# Patient Record
Sex: MALE | Race: WHITE | Employment: FULL TIME
[De-identification: names, ages, dates, MRNs, and addresses within clinical notes are randomized per-mention and may not be internally consistent; named-entity substitution may affect disease eponyms.]

---

## 2017-01-09 RX ORDER — PRAVASTATIN SODIUM 80 MG/1
80 TABLET ORAL DAILY
Qty: 90 TABLET | Refills: 3 | Status: SHIPPED | OUTPATIENT
Start: 2017-01-09 | End: 2017-07-24

## 2017-01-09 RX ORDER — GABAPENTIN 400 MG/1
400 CAPSULE ORAL 2 TIMES DAILY
Qty: 180 CAPSULE | Refills: 3 | Status: SHIPPED | OUTPATIENT
Start: 2017-01-09 | End: 2017-07-12 | Stop reason: SDUPTHER

## 2017-01-09 RX ORDER — AMLODIPINE BESYLATE 10 MG/1
10 TABLET ORAL DAILY
Qty: 90 TABLET | Refills: 3 | Status: SHIPPED | OUTPATIENT
Start: 2017-01-09 | End: 2017-12-07 | Stop reason: SDUPTHER

## 2017-01-09 RX ORDER — LISINOPRIL AND HYDROCHLOROTHIAZIDE 20; 12.5 MG/1; MG/1
1 TABLET ORAL DAILY
Qty: 90 TABLET | Refills: 3 | Status: SHIPPED | OUTPATIENT
Start: 2017-01-09 | End: 2017-12-07 | Stop reason: SDUPTHER

## 2017-01-09 RX ORDER — WARFARIN SODIUM 5 MG/1
TABLET ORAL
Qty: 180 TABLET | Refills: 3 | Status: SHIPPED | OUTPATIENT
Start: 2017-01-09 | End: 2017-07-24

## 2017-01-23 LAB
INR BLD: 1.8
PROTIME: 20.6 SECONDS

## 2017-01-24 ENCOUNTER — ANTI-COAG VISIT (OUTPATIENT)
Dept: FAMILY MEDICINE CLINIC | Facility: CLINIC | Age: 62
End: 2017-01-24

## 2017-01-24 DIAGNOSIS — I82.492 DEEP VEIN THROMBOSIS (DVT) OF OTHER VEIN OF LEFT LOWER EXTREMITY, UNSPECIFIED CHRONICITY (HCC): ICD-10-CM

## 2017-02-27 LAB
INR BLD: 1.4
PROTIME: 15.9 SECONDS

## 2017-02-28 ENCOUNTER — ANTI-COAG VISIT (OUTPATIENT)
Dept: FAMILY MEDICINE CLINIC | Facility: CLINIC | Age: 62
End: 2017-02-28

## 2017-02-28 DIAGNOSIS — I82.492 DEEP VEIN THROMBOSIS (DVT) OF OTHER VEIN OF LEFT LOWER EXTREMITY, UNSPECIFIED CHRONICITY (HCC): ICD-10-CM

## 2017-03-18 LAB
INR BLD: 1.9
PROTIME: 21.8 SECONDS

## 2017-03-20 ENCOUNTER — ANTI-COAG VISIT (OUTPATIENT)
Dept: FAMILY MEDICINE CLINIC | Age: 62
End: 2017-03-20

## 2017-03-20 DIAGNOSIS — I82.492 DEEP VEIN THROMBOSIS (DVT) OF OTHER VEIN OF LEFT LOWER EXTREMITY, UNSPECIFIED CHRONICITY (HCC): ICD-10-CM

## 2017-04-17 LAB
INR BLD: 2.2
PROTIME: 25.2 SECONDS

## 2017-04-18 ENCOUNTER — ANTI-COAG VISIT (OUTPATIENT)
Dept: FAMILY MEDICINE CLINIC | Age: 62
End: 2017-04-18

## 2017-04-18 DIAGNOSIS — I82.492 DEEP VEIN THROMBOSIS (DVT) OF OTHER VEIN OF LEFT LOWER EXTREMITY, UNSPECIFIED CHRONICITY (HCC): ICD-10-CM

## 2017-05-01 LAB
INR BLD: 2
PROTIME: 23.1 SECONDS

## 2017-05-02 ENCOUNTER — ANTI-COAG VISIT (OUTPATIENT)
Dept: FAMILY MEDICINE CLINIC | Age: 62
End: 2017-05-02

## 2017-05-02 DIAGNOSIS — I82.492 DEEP VEIN THROMBOSIS (DVT) OF OTHER VEIN OF LEFT LOWER EXTREMITY, UNSPECIFIED CHRONICITY (HCC): ICD-10-CM

## 2017-06-22 DIAGNOSIS — I82.409 DVT (DEEP VENOUS THROMBOSIS) (HCC): ICD-10-CM

## 2017-06-22 DIAGNOSIS — Z79.01 ENCOUNTER FOR MONITORING COUMADIN THERAPY: ICD-10-CM

## 2017-06-22 DIAGNOSIS — Z51.81 ENCOUNTER FOR MONITORING COUMADIN THERAPY: ICD-10-CM

## 2017-06-23 LAB
INR BLD: 2
INR BLD: 2
INR BLD: 2.3
PROTIME: 22.8 SECONDS

## 2017-06-26 ENCOUNTER — ANTI-COAG VISIT (OUTPATIENT)
Dept: FAMILY MEDICINE CLINIC | Age: 62
End: 2017-06-26

## 2017-06-26 DIAGNOSIS — I82.492 DEEP VEIN THROMBOSIS (DVT) OF OTHER VEIN OF LEFT LOWER EXTREMITY, UNSPECIFIED CHRONICITY (HCC): ICD-10-CM

## 2017-07-05 ENCOUNTER — TELEPHONE (OUTPATIENT)
Dept: FAMILY MEDICINE CLINIC | Age: 62
End: 2017-07-05

## 2017-07-05 DIAGNOSIS — E11.9 TYPE 2 DIABETES MELLITUS WITHOUT COMPLICATION, WITHOUT LONG-TERM CURRENT USE OF INSULIN (HCC): Primary | ICD-10-CM

## 2017-07-05 DIAGNOSIS — E78.2 MIXED HYPERLIPIDEMIA: ICD-10-CM

## 2017-07-05 DIAGNOSIS — I10 ESSENTIAL HYPERTENSION, BENIGN: ICD-10-CM

## 2017-07-05 DIAGNOSIS — Z12.5 SCREENING FOR PROSTATE CANCER: ICD-10-CM

## 2017-07-12 RX ORDER — GABAPENTIN 400 MG/1
400 CAPSULE ORAL 2 TIMES DAILY
Qty: 180 CAPSULE | Refills: 3 | Status: SHIPPED | OUTPATIENT
Start: 2017-07-12 | End: 2017-07-24 | Stop reason: SDUPTHER

## 2017-07-17 LAB
AVERAGE GLUCOSE: 128
CHOLESTEROL, TOTAL: 183 MG/DL
CHOLESTEROL/HDL RATIO: 4.1
HBA1C MFR BLD: 6.1 %
HDLC SERPL-MCNC: 45 MG/DL (ref 35–70)
INR BLD: 2.3
LDL CHOLESTEROL CALCULATED: 92 MG/DL (ref 0–160)
PROTIME: 25.2 SECONDS
TESTOSTERONE TOTAL: 2.5
TRIGL SERPL-MCNC: 230 MG/DL
TSH SERPL DL<=0.05 MIU/L-ACNC: 1.1 UIU/ML
VLDLC SERPL CALC-MCNC: 46 MG/DL

## 2017-07-18 ENCOUNTER — ANTI-COAG VISIT (OUTPATIENT)
Dept: FAMILY MEDICINE CLINIC | Age: 62
End: 2017-07-18

## 2017-07-18 DIAGNOSIS — I82.492 DEEP VEIN THROMBOSIS (DVT) OF OTHER VEIN OF LEFT LOWER EXTREMITY, UNSPECIFIED CHRONICITY (HCC): ICD-10-CM

## 2017-07-19 DIAGNOSIS — E78.2 MIXED HYPERLIPIDEMIA: ICD-10-CM

## 2017-07-19 DIAGNOSIS — E11.9 TYPE 2 DIABETES MELLITUS WITHOUT COMPLICATION, WITHOUT LONG-TERM CURRENT USE OF INSULIN (HCC): ICD-10-CM

## 2017-07-24 ENCOUNTER — OFFICE VISIT (OUTPATIENT)
Dept: FAMILY MEDICINE CLINIC | Age: 62
End: 2017-07-24
Payer: COMMERCIAL

## 2017-07-24 VITALS
HEART RATE: 65 BPM | DIASTOLIC BLOOD PRESSURE: 62 MMHG | BODY MASS INDEX: 34.81 KG/M2 | HEIGHT: 72 IN | WEIGHT: 257 LBS | SYSTOLIC BLOOD PRESSURE: 126 MMHG

## 2017-07-24 DIAGNOSIS — E11.9 TYPE 2 DIABETES MELLITUS WITHOUT COMPLICATION, WITHOUT LONG-TERM CURRENT USE OF INSULIN (HCC): ICD-10-CM

## 2017-07-24 DIAGNOSIS — G47.33 OBSTRUCTIVE SLEEP APNEA SYNDROME: ICD-10-CM

## 2017-07-24 DIAGNOSIS — Z00.01 ENCOUNTER FOR GENERAL ADULT MEDICAL EXAMINATION WITH ABNORMAL FINDINGS: Primary | ICD-10-CM

## 2017-07-24 DIAGNOSIS — K21.9 GASTROESOPHAGEAL REFLUX DISEASE WITHOUT ESOPHAGITIS: ICD-10-CM

## 2017-07-24 DIAGNOSIS — I10 BENIGN ESSENTIAL HYPERTENSION: ICD-10-CM

## 2017-07-24 DIAGNOSIS — G89.4 CHRONIC PAIN SYNDROME: ICD-10-CM

## 2017-07-24 PROCEDURE — 99396 PREV VISIT EST AGE 40-64: CPT | Performed by: FAMILY MEDICINE

## 2017-07-24 RX ORDER — GABAPENTIN 400 MG/1
400 CAPSULE ORAL 2 TIMES DAILY
Qty: 60 CAPSULE | Refills: 11 | Status: SHIPPED | OUTPATIENT
Start: 2017-07-24 | End: 2018-03-06 | Stop reason: SDUPTHER

## 2017-07-24 RX ORDER — TAMSULOSIN HYDROCHLORIDE 0.4 MG/1
0.4 CAPSULE ORAL DAILY
Qty: 30 CAPSULE | Refills: 11 | Status: SHIPPED | OUTPATIENT
Start: 2017-07-24 | End: 2017-08-28 | Stop reason: SDUPTHER

## 2017-07-24 RX ORDER — GABAPENTIN 400 MG/1
400 CAPSULE ORAL 2 TIMES DAILY
Qty: 60 CAPSULE | Refills: 3 | Status: CANCELLED | OUTPATIENT
Start: 2017-07-24

## 2017-07-24 RX ORDER — ZOLPIDEM TARTRATE 10 MG/1
5-10 TABLET ORAL NIGHTLY PRN
Qty: 30 TABLET | Refills: 0 | Status: SHIPPED | OUTPATIENT
Start: 2017-07-24 | End: 2018-05-21 | Stop reason: ALTCHOICE

## 2017-07-24 ASSESSMENT — ENCOUNTER SYMPTOMS
PHOTOPHOBIA: 0
CHEST TIGHTNESS: 0
COLOR CHANGE: 0
CONSTIPATION: 0
DIARRHEA: 0
STRIDOR: 0
VOMITING: 0
BACK PAIN: 1
RHINORRHEA: 0
WHEEZING: 0
SINUS PRESSURE: 0
EYE PAIN: 0
COUGH: 0
CHOKING: 0
EYE ITCHING: 0
EYE REDNESS: 0
NAUSEA: 0
ABDOMINAL DISTENTION: 0
SHORTNESS OF BREATH: 0
BLOOD IN STOOL: 0
ABDOMINAL PAIN: 0
APNEA: 0
SORE THROAT: 0
EYE DISCHARGE: 0

## 2017-07-24 ASSESSMENT — PATIENT HEALTH QUESTIONNAIRE - PHQ9
SUM OF ALL RESPONSES TO PHQ9 QUESTIONS 1 & 2: 0
SUM OF ALL RESPONSES TO PHQ QUESTIONS 1-9: 0
2. FEELING DOWN, DEPRESSED OR HOPELESS: 0
1. LITTLE INTEREST OR PLEASURE IN DOING THINGS: 0

## 2017-08-28 ENCOUNTER — HOSPITAL ENCOUNTER (OUTPATIENT)
Age: 62
Setting detail: SPECIMEN
Discharge: HOME | End: 2017-08-28
Payer: COMMERCIAL

## 2017-08-28 ENCOUNTER — OFFICE VISIT (OUTPATIENT)
Dept: FAMILY MEDICINE CLINIC | Age: 62
End: 2017-08-28
Payer: COMMERCIAL

## 2017-08-28 VITALS
SYSTOLIC BLOOD PRESSURE: 138 MMHG | HEART RATE: 63 BPM | BODY MASS INDEX: 36.21 KG/M2 | DIASTOLIC BLOOD PRESSURE: 70 MMHG | WEIGHT: 267 LBS

## 2017-08-28 DIAGNOSIS — N40.1 BENIGN NON-NODULAR PROSTATIC HYPERPLASIA WITH LOWER URINARY TRACT SYMPTOMS: Primary | ICD-10-CM

## 2017-08-28 DIAGNOSIS — Z12.5 SCREENING FOR PROSTATE CANCER: ICD-10-CM

## 2017-08-28 DIAGNOSIS — N40.1 BENIGN NON-NODULAR PROSTATIC HYPERPLASIA WITH LOWER URINARY TRACT SYMPTOMS: ICD-10-CM

## 2017-08-28 LAB — PROSTATE SPECIFIC ANTIGEN: 0.52 UG/L

## 2017-08-28 PROCEDURE — 99213 OFFICE O/P EST LOW 20 MIN: CPT | Performed by: FAMILY MEDICINE

## 2017-08-28 RX ORDER — DUTASTERIDE 0.5 MG/1
0.5 CAPSULE, LIQUID FILLED ORAL DAILY
Qty: 30 CAPSULE | Refills: 1 | COMMUNITY
Start: 2017-08-28 | End: 2017-11-02 | Stop reason: SDUPTHER

## 2017-08-28 RX ORDER — TAMSULOSIN HYDROCHLORIDE 0.4 MG/1
0.4 CAPSULE ORAL DAILY
Qty: 90 CAPSULE | Refills: 3 | Status: SHIPPED | OUTPATIENT
Start: 2017-08-28 | End: 2017-11-02 | Stop reason: SDUPTHER

## 2017-08-28 ASSESSMENT — ENCOUNTER SYMPTOMS
WHEEZING: 0
ABDOMINAL DISTENTION: 0
RHINORRHEA: 0
NAUSEA: 0
DIARRHEA: 0
PHOTOPHOBIA: 0
CONSTIPATION: 0
SORE THROAT: 0
BLOOD IN STOOL: 0
APNEA: 0
BACK PAIN: 0
CHOKING: 0
VOMITING: 0
EYE PAIN: 0
EYE DISCHARGE: 0
EYE REDNESS: 0
COUGH: 0
CHEST TIGHTNESS: 0
STRIDOR: 0
SHORTNESS OF BREATH: 0
COLOR CHANGE: 0
ABDOMINAL PAIN: 0
SINUS PRESSURE: 0
EYE ITCHING: 0

## 2017-08-29 ENCOUNTER — ANTI-COAG VISIT (OUTPATIENT)
Dept: FAMILY MEDICINE CLINIC | Age: 62
End: 2017-08-29

## 2017-08-29 DIAGNOSIS — I82.492 DEEP VEIN THROMBOSIS (DVT) OF OTHER VEIN OF LEFT LOWER EXTREMITY, UNSPECIFIED CHRONICITY (HCC): ICD-10-CM

## 2017-10-30 ENCOUNTER — OFFICE VISIT (OUTPATIENT)
Dept: FAMILY MEDICINE CLINIC | Age: 62
End: 2017-10-30
Payer: COMMERCIAL

## 2017-10-30 VITALS
OXYGEN SATURATION: 97 % | DIASTOLIC BLOOD PRESSURE: 60 MMHG | BODY MASS INDEX: 37.57 KG/M2 | SYSTOLIC BLOOD PRESSURE: 110 MMHG | HEART RATE: 94 BPM | WEIGHT: 268.4 LBS | HEIGHT: 71 IN

## 2017-10-30 DIAGNOSIS — I10 BENIGN ESSENTIAL HYPERTENSION: ICD-10-CM

## 2017-10-30 DIAGNOSIS — E78.2 MIXED HYPERLIPIDEMIA: ICD-10-CM

## 2017-10-30 DIAGNOSIS — G89.4 CHRONIC PAIN SYNDROME: ICD-10-CM

## 2017-10-30 DIAGNOSIS — I82.4Y9 DEEP VEIN THROMBOSIS (DVT) OF PROXIMAL LOWER EXTREMITY, UNSPECIFIED CHRONICITY, UNSPECIFIED LATERALITY (HCC): ICD-10-CM

## 2017-10-30 PROCEDURE — 99213 OFFICE O/P EST LOW 20 MIN: CPT | Performed by: FAMILY MEDICINE

## 2017-10-30 RX ORDER — DUTASTERIDE AND TAMSULOSIN HYDROCHLORIDE CAPSULES .5; .4 MG/1; MG/1
1 CAPSULE ORAL DAILY
Qty: 90 CAPSULE | Refills: 3 | Status: SHIPPED | OUTPATIENT
Start: 2017-10-30 | End: 2017-11-02

## 2017-10-30 RX ORDER — HYDROCODONE BITARTRATE AND ACETAMINOPHEN 5; 325 MG/1; MG/1
1-2 TABLET ORAL 4 TIMES DAILY PRN
Qty: 60 TABLET | Refills: 0 | Status: SHIPPED | OUTPATIENT
Start: 2017-10-30 | End: 2018-04-23 | Stop reason: SDUPTHER

## 2017-10-30 ASSESSMENT — ENCOUNTER SYMPTOMS
ABDOMINAL DISTENTION: 0
EYE REDNESS: 0
APNEA: 0
CHOKING: 0
EYE PAIN: 0
SHORTNESS OF BREATH: 0
STRIDOR: 0
DIARRHEA: 0
SORE THROAT: 0
CONSTIPATION: 0
COUGH: 0
WHEEZING: 0
VOMITING: 0
COLOR CHANGE: 0
ABDOMINAL PAIN: 0
EYE DISCHARGE: 0
CHEST TIGHTNESS: 0
NAUSEA: 0
RHINORRHEA: 0
PHOTOPHOBIA: 0
SINUS PRESSURE: 0
BACK PAIN: 0
EYE ITCHING: 0
BLOOD IN STOOL: 0

## 2017-10-31 ENCOUNTER — TELEPHONE (OUTPATIENT)
Dept: FAMILY MEDICINE CLINIC | Age: 62
End: 2017-10-31

## 2017-10-31 DIAGNOSIS — N40.1 BENIGN NON-NODULAR PROSTATIC HYPERPLASIA WITH LOWER URINARY TRACT SYMPTOMS: ICD-10-CM

## 2017-11-01 ENCOUNTER — TELEPHONE (OUTPATIENT)
Dept: FAMILY MEDICINE CLINIC | Age: 62
End: 2017-11-01

## 2017-11-01 NOTE — TELEPHONE ENCOUNTER
If avodart is on formulary then the avodart and flomax and be prescribed separately, or even less than that would be a combination of proscar and flomax. Please check with him and see which he'd like me to send in.

## 2017-11-02 RX ORDER — DUTASTERIDE 0.5 MG/1
0.5 CAPSULE, LIQUID FILLED ORAL DAILY
Qty: 90 CAPSULE | Refills: 1 | Status: SHIPPED | OUTPATIENT
Start: 2017-11-02 | End: 2017-11-03 | Stop reason: SDUPTHER

## 2017-11-02 RX ORDER — TAMSULOSIN HYDROCHLORIDE 0.4 MG/1
0.4 CAPSULE ORAL DAILY
Qty: 90 CAPSULE | Refills: 3 | Status: SHIPPED | OUTPATIENT
Start: 2017-11-02 | End: 2017-11-03 | Stop reason: SDUPTHER

## 2017-11-03 ENCOUNTER — TELEPHONE (OUTPATIENT)
Dept: FAMILY MEDICINE CLINIC | Age: 62
End: 2017-11-03

## 2017-11-03 DIAGNOSIS — N40.1 BENIGN NON-NODULAR PROSTATIC HYPERPLASIA WITH LOWER URINARY TRACT SYMPTOMS: ICD-10-CM

## 2017-11-03 RX ORDER — TAMSULOSIN HYDROCHLORIDE 0.4 MG/1
0.4 CAPSULE ORAL DAILY
Qty: 90 CAPSULE | Refills: 3 | Status: SHIPPED | OUTPATIENT
Start: 2017-11-03 | End: 2018-09-18 | Stop reason: SDUPTHER

## 2017-11-03 RX ORDER — DUTASTERIDE 0.5 MG/1
0.5 CAPSULE, LIQUID FILLED ORAL DAILY
Qty: 90 CAPSULE | Refills: 1 | Status: SHIPPED | OUTPATIENT
Start: 2017-11-03 | End: 2017-11-08 | Stop reason: SDUPTHER

## 2017-11-03 NOTE — TELEPHONE ENCOUNTER
The patient is calling in concern about taking Metformin. The patient stated the last time he was notfied about his lab results his values was all in normal range. The paitent stated that last time he was suppose to be taking 1 tablet daily but now he is instructed to take metformin twice daily. The patient want to know should he be taking his metformin at all due to his last results. The patient stated he recalled you statign he doesn't have to take it at all. What do you suggest? Please advise.

## 2017-11-03 NOTE — TELEPHONE ENCOUNTER
Detail message was left on the patient vm about BE response to leave the choice for the patient to take his Metformin. The patient was advised on the vm on how weight gain may occur if the patient decides not to take the metformin.

## 2017-11-08 DIAGNOSIS — N40.1 BENIGN NON-NODULAR PROSTATIC HYPERPLASIA WITH LOWER URINARY TRACT SYMPTOMS: ICD-10-CM

## 2017-11-08 RX ORDER — DUTASTERIDE 0.5 MG/1
0.5 CAPSULE, LIQUID FILLED ORAL DAILY
Qty: 90 CAPSULE | Refills: 3 | Status: SHIPPED | OUTPATIENT
Start: 2017-11-08 | End: 2018-11-16 | Stop reason: SDUPTHER

## 2017-12-07 RX ORDER — AMLODIPINE BESYLATE 10 MG/1
10 TABLET ORAL DAILY
Qty: 90 TABLET | Refills: 3 | Status: SHIPPED | OUTPATIENT
Start: 2017-12-07 | End: 2018-09-18 | Stop reason: SDUPTHER

## 2017-12-07 RX ORDER — LISINOPRIL AND HYDROCHLOROTHIAZIDE 20; 12.5 MG/1; MG/1
1 TABLET ORAL DAILY
Qty: 90 TABLET | Refills: 3 | Status: SHIPPED | OUTPATIENT
Start: 2017-12-07 | End: 2018-09-18 | Stop reason: SDUPTHER

## 2018-01-10 ENCOUNTER — TELEPHONE (OUTPATIENT)
Dept: FAMILY MEDICINE CLINIC | Age: 63
End: 2018-01-10

## 2018-01-10 NOTE — TELEPHONE ENCOUNTER
Generally that would be at the direction of the dentist it isn't absolutely necessary but if it's more than one tooth it's probably a good idea to hold it for one day before and then start it back the day after the extractions.

## 2018-01-11 NOTE — TELEPHONE ENCOUNTER
He has been advised to stop med the day before his extractions and restart the day after extractions.

## 2018-01-15 NOTE — TELEPHONE ENCOUNTER
Health Maintenance   Topic Date Due    Hepatitis C screen  1955    Diabetic foot exam  09/21/1966    HIV screen  09/21/1971    DTaP/Tdap/Td vaccine (1 - Tdap) 09/21/1975    Pneumococcal med risk (1 of 1 - PPSV23) 09/21/1975    Smoker: low dose lung CT screening  11/30/2015    Potassium monitoring  12/06/2015    Creatinine monitoring  12/06/2015    Diabetic microalbuminuria test  09/08/2016    Zostavax vaccine  09/21/2016    Flu vaccine (1) 09/01/2017    Diabetic retinal exam  03/20/2018    A1C test (Diabetic or Prediabetic)  07/17/2018    Lipid screen  07/17/2018    Colon cancer screen colonoscopy  08/26/2024       Hemoglobin A1C (%)   Date Value   07/17/2017 6.1   12/02/2015 6.4   09/08/2015 7.2             ( goal A1C is < 7)   No results found for: LABMICR  LDL Calculated (mg/dL)   Date Value   07/17/2017 92       (goal LDL is <100)   AST (U/L)   Date Value   12/03/2014 17     ALT (U/L)   Date Value   12/03/2014 17     BUN (mg/dL)   Date Value   12/06/2014 20     BP Readings from Last 3 Encounters:   10/30/17 110/60   08/28/17 138/70   07/24/17 126/62          (goal 120/80)    All Future Testing planned in [facility]  Lab Frequency Next Occurrence   PSA Screening Once 07/05/2017   Comprehensive Metabolic Panel Once 99/37/7378   POCT glycosylated hemoglobin (Hb A1C) Once 10/24/2017       Next Visit Date:  No future appointments.          Patient Active Problem List:     Hyperlipidemia     Obesity     OA (osteoarthritis) of knee     S/P left knee arthroscopy     Benign essential hypertension     Sleep apnea     Deep vein thrombosis (HCC)     Hip pain     [place] filter in place     Diverticulosis     Diabetes ([address]     Gastroesophageal reflux disease without esophagitis     Chronic pain

## 2018-03-06 RX ORDER — GABAPENTIN 400 MG/1
400 CAPSULE ORAL 2 TIMES DAILY
Qty: 60 CAPSULE | Refills: 11 | Status: SHIPPED | OUTPATIENT
Start: 2018-03-06 | End: 2019-06-10 | Stop reason: SDUPTHER

## 2018-04-23 DIAGNOSIS — M17.0 PRIMARY OSTEOARTHRITIS OF BOTH KNEES: Primary | ICD-10-CM

## 2018-04-23 RX ORDER — HYDROCODONE BITARTRATE AND ACETAMINOPHEN 5; 325 MG/1; MG/1
1-2 TABLET ORAL 4 TIMES DAILY PRN
Qty: 60 TABLET | Refills: 0 | Status: SHIPPED | OUTPATIENT
Start: 2018-04-23 | End: 2018-08-13 | Stop reason: SDUPTHER

## 2018-05-21 ENCOUNTER — OFFICE VISIT (OUTPATIENT)
Dept: FAMILY MEDICINE CLINIC | Age: 63
End: 2018-05-21
Payer: COMMERCIAL

## 2018-05-21 VITALS
DIASTOLIC BLOOD PRESSURE: 64 MMHG | HEIGHT: 72 IN | OXYGEN SATURATION: 97 % | HEART RATE: 84 BPM | BODY MASS INDEX: 32.8 KG/M2 | SYSTOLIC BLOOD PRESSURE: 110 MMHG | WEIGHT: 242.2 LBS

## 2018-05-21 DIAGNOSIS — E78.2 MIXED HYPERLIPIDEMIA: ICD-10-CM

## 2018-05-21 DIAGNOSIS — E11.9 TYPE 2 DIABETES MELLITUS WITHOUT COMPLICATION, WITHOUT LONG-TERM CURRENT USE OF INSULIN (HCC): Primary | ICD-10-CM

## 2018-05-21 DIAGNOSIS — I10 BENIGN ESSENTIAL HYPERTENSION: ICD-10-CM

## 2018-05-21 DIAGNOSIS — G89.4 CHRONIC PAIN SYNDROME: ICD-10-CM

## 2018-05-21 LAB — HBA1C MFR BLD: 6 %

## 2018-05-21 PROCEDURE — 99214 OFFICE O/P EST MOD 30 MIN: CPT | Performed by: FAMILY MEDICINE

## 2018-05-21 PROCEDURE — 83036 HEMOGLOBIN GLYCOSYLATED A1C: CPT | Performed by: FAMILY MEDICINE

## 2018-05-21 ASSESSMENT — ENCOUNTER SYMPTOMS
EYE ITCHING: 0
BACK PAIN: 0
APNEA: 0
NAUSEA: 0
COUGH: 0
SINUS PRESSURE: 0
EYE PAIN: 0
VOMITING: 0
RHINORRHEA: 0
COLOR CHANGE: 0
ABDOMINAL DISTENTION: 0
EYE REDNESS: 0
SHORTNESS OF BREATH: 0
SORE THROAT: 0
CHOKING: 0
CONSTIPATION: 0
ABDOMINAL PAIN: 0
PHOTOPHOBIA: 0
WHEEZING: 0
STRIDOR: 0
BLOOD IN STOOL: 0
CHEST TIGHTNESS: 0
DIARRHEA: 0
EYE DISCHARGE: 0

## 2018-08-13 DIAGNOSIS — M17.0 PRIMARY OSTEOARTHRITIS OF BOTH KNEES: ICD-10-CM

## 2018-08-13 RX ORDER — HYDROCODONE BITARTRATE AND ACETAMINOPHEN 5; 325 MG/1; MG/1
1-2 TABLET ORAL 4 TIMES DAILY PRN
Qty: 60 TABLET | Refills: 0 | Status: SHIPPED | OUTPATIENT
Start: 2018-08-13 | End: 2019-03-04 | Stop reason: SDUPTHER

## 2018-08-13 NOTE — TELEPHONE ENCOUNTER
Last visit: 5-21-18    Next Visit Date:  No future appointments.     Health Maintenance   Topic Date Due    Hepatitis C screen  1955    Diabetic foot exam  09/21/1966    HIV screen  09/21/1971    DTaP/Tdap/Td vaccine (1 - Tdap) 09/21/1975    Pneumococcal med risk (1 of 1 - PPSV23) 09/21/1975    Shingles Vaccine (1 of 2 - 2 Dose Series) 09/21/2006    Low dose CT lung screening  09/21/2011    Potassium monitoring  12/06/2015    Creatinine monitoring  12/06/2015    Diabetic microalbuminuria test  09/08/2016    Diabetic retinal exam  03/20/2018    Lipid screen  07/17/2018    Flu vaccine (1) 09/01/2018    A1C test (Diabetic or Prediabetic)  05/21/2019    Colon cancer screen colonoscopy  08/26/2024       Hemoglobin A1C (%)   Date Value   05/21/2018 6.0   07/17/2017 6.1   12/02/2015 6.4             ( goal A1C is < 7)   No results found for: LABMICR  LDL Calculated (mg/dL)   Date Value   07/17/2017 92   09/08/2015 111       (goal LDL is <100)   AST (U/L)   Date Value   12/03/2014 17     ALT (U/L)   Date Value   12/03/2014 17     BUN (mg/dL)   Date Value   12/06/2014 20     BP Readings from Last 3 Encounters:   05/21/18 110/64   10/30/17 110/60   08/28/17 138/70          (goal 120/80)    All Future Testing planned in [facility]  Lab Frequency Next Occurrence               Patient Active Problem List:     Hyperlipidemia     Obesity     OA (osteoarthritis) of knee     S/P left knee arthroscopy     Benign essential hypertension     Sleep apnea     Deep vein thrombosis (HCC)     Hip pain     [place] filter in place     Diverticulosis     Diabetes ([address]     Gastroesophageal reflux disease without esophagitis     Chronic pain

## 2018-09-18 DIAGNOSIS — N40.1 BENIGN NON-NODULAR PROSTATIC HYPERPLASIA WITH LOWER URINARY TRACT SYMPTOMS: ICD-10-CM

## 2018-09-19 RX ORDER — LISINOPRIL AND HYDROCHLOROTHIAZIDE 20; 12.5 MG/1; MG/1
1 TABLET ORAL DAILY
Qty: 90 TABLET | Refills: 3 | Status: SHIPPED | OUTPATIENT
Start: 2018-09-19 | End: 2019-09-30 | Stop reason: SDUPTHER

## 2018-09-19 RX ORDER — TAMSULOSIN HYDROCHLORIDE 0.4 MG/1
0.4 CAPSULE ORAL DAILY
Qty: 90 CAPSULE | Refills: 3 | Status: SHIPPED | OUTPATIENT
Start: 2018-09-19 | End: 2019-09-30 | Stop reason: SDUPTHER

## 2018-09-19 RX ORDER — AMLODIPINE BESYLATE 10 MG/1
10 TABLET ORAL DAILY
Qty: 90 TABLET | Refills: 3 | Status: SHIPPED | OUTPATIENT
Start: 2018-09-19 | End: 2019-09-30 | Stop reason: SDUPTHER

## 2018-11-16 DIAGNOSIS — N40.1 BENIGN NON-NODULAR PROSTATIC HYPERPLASIA WITH LOWER URINARY TRACT SYMPTOMS: ICD-10-CM

## 2018-11-16 RX ORDER — DUTASTERIDE 0.5 MG/1
0.5 CAPSULE, LIQUID FILLED ORAL DAILY
Qty: 90 CAPSULE | Refills: 3 | Status: SHIPPED | OUTPATIENT
Start: 2018-11-16 | End: 2019-10-17 | Stop reason: SDUPTHER

## 2018-11-16 NOTE — TELEPHONE ENCOUNTER
Next Visit Date:  No future appointments.     Health Maintenance   Topic Date Due    Hepatitis C screen  1955    Diabetic foot exam  09/21/1966    HIV screen  09/21/1971    DTaP/Tdap/Td vaccine (1 - Tdap) 09/21/1975    Pneumococcal med risk (1 of 1 - PPSV23) 09/21/1975    Shingles Vaccine (1 of 2 - 2 Dose Series) 09/21/2006    Low dose CT lung screening  09/21/2011    Potassium monitoring  12/06/2015    Creatinine monitoring  12/06/2015    Diabetic microalbuminuria test  09/08/2016    Diabetic retinal exam  03/20/2018    Lipid screen  07/17/2018    Flu vaccine (1) 09/01/2018    A1C test (Diabetic or Prediabetic)  05/21/2019    Colon cancer screen colonoscopy  08/26/2024       Hemoglobin A1C (%)   Date Value   05/21/2018 6.0   07/17/2017 6.1   12/02/2015 6.4             ( goal A1C is < 7)   No results found for: LABMICR  LDL Calculated (mg/dL)   Date Value   07/17/2017 92   09/08/2015 111       (goal LDL is <100)   AST (U/L)   Date Value   12/03/2014 17     ALT (U/L)   Date Value   12/03/2014 17     BUN (mg/dL)   Date Value   12/06/2014 20     BP Readings from Last 3 Encounters:   05/21/18 110/64   10/30/17 110/60   08/28/17 138/70          (goal 120/80)    All Future Testing planned in [facility]  Lab Frequency Next Occurrence               Patient Active Problem List:     Hyperlipidemia     Obesity     OA (osteoarthritis) of knee     S/P left knee arthroscopy     Benign essential hypertension     Sleep apnea     Deep vein thrombosis (HCC)     Hip pain     [place] filter in place     Diverticulosis     Diabetes ([facility] [address]     Gastroesophageal reflux disease without esophagitis     Chronic pain

## 2019-03-04 ENCOUNTER — HOSPITAL ENCOUNTER (OUTPATIENT)
Age: 64
Setting detail: SPECIMEN
Discharge: HOME | End: 2019-03-04
Payer: COMMERCIAL

## 2019-03-04 ENCOUNTER — OFFICE VISIT (OUTPATIENT)
Dept: FAMILY MEDICINE CLINIC | Age: 64
End: 2019-03-04
Payer: COMMERCIAL

## 2019-03-04 VITALS
BODY MASS INDEX: 35.21 KG/M2 | DIASTOLIC BLOOD PRESSURE: 68 MMHG | OXYGEN SATURATION: 98 % | WEIGHT: 260 LBS | HEART RATE: 71 BPM | HEIGHT: 72 IN | SYSTOLIC BLOOD PRESSURE: 118 MMHG

## 2019-03-04 DIAGNOSIS — R35.1 BENIGN PROSTATIC HYPERPLASIA WITH NOCTURIA: ICD-10-CM

## 2019-03-04 DIAGNOSIS — Z23 IMMUNIZATION DUE: ICD-10-CM

## 2019-03-04 DIAGNOSIS — Z11.4 SCREENING FOR HIV (HUMAN IMMUNODEFICIENCY VIRUS): ICD-10-CM

## 2019-03-04 DIAGNOSIS — E11.9 TYPE 2 DIABETES MELLITUS WITHOUT COMPLICATION, WITHOUT LONG-TERM CURRENT USE OF INSULIN (HCC): ICD-10-CM

## 2019-03-04 DIAGNOSIS — M17.0 PRIMARY OSTEOARTHRITIS OF BOTH KNEES: ICD-10-CM

## 2019-03-04 DIAGNOSIS — E78.2 MIXED HYPERLIPIDEMIA: ICD-10-CM

## 2019-03-04 DIAGNOSIS — Z11.59 ENCOUNTER FOR HEPATITIS C SCREENING TEST FOR LOW RISK PATIENT: ICD-10-CM

## 2019-03-04 DIAGNOSIS — I10 ESSENTIAL HYPERTENSION: ICD-10-CM

## 2019-03-04 DIAGNOSIS — E11.9 TYPE 2 DIABETES MELLITUS WITHOUT COMPLICATION, WITHOUT LONG-TERM CURRENT USE OF INSULIN (HCC): Primary | ICD-10-CM

## 2019-03-04 DIAGNOSIS — N40.1 BENIGN PROSTATIC HYPERPLASIA WITH NOCTURIA: ICD-10-CM

## 2019-03-04 LAB
ALBUMIN SERPL-MCNC: 4.3 G/DL (ref 3.5–5.2)
ALBUMIN/GLOBULIN RATIO: 1.5 (ref 1–2.5)
ALP BLD-CCNC: 72 U/L (ref 40–129)
ALT SERPL-CCNC: 22 U/L (ref 5–41)
ANION GAP SERPL CALCULATED.3IONS-SCNC: 14 MMOL/L (ref 9–17)
AST SERPL-CCNC: 24 U/L
BILIRUB SERPL-MCNC: 0.34 MG/DL (ref 0.3–1.2)
BUN BLDV-MCNC: 24 MG/DL (ref 8–23)
BUN/CREAT BLD: ABNORMAL (ref 9–20)
CALCIUM SERPL-MCNC: 9.3 MG/DL (ref 8.6–10.4)
CHLORIDE BLD-SCNC: 105 MMOL/L (ref 98–107)
CHOLESTEROL/HDL RATIO: 6
CHOLESTEROL: 256 MG/DL
CO2: 27 MMOL/L (ref 20–31)
CREAT SERPL-MCNC: 0.98 MG/DL (ref 0.7–1.2)
CREATININE URINE: 143.5 MG/DL (ref 39–259)
ESTIMATED AVERAGE GLUCOSE: 148 MG/DL
GFR AFRICAN AMERICAN: >60 ML/MIN
GFR NON-AFRICAN AMERICAN: >60 ML/MIN
GFR SERPL CREATININE-BSD FRML MDRD: ABNORMAL ML/MIN/{1.73_M2}
GFR SERPL CREATININE-BSD FRML MDRD: ABNORMAL ML/MIN/{1.73_M2}
GLUCOSE BLD-MCNC: 136 MG/DL (ref 70–99)
HBA1C MFR BLD: 6.8 % (ref 4–6)
HDLC SERPL-MCNC: 43 MG/DL
HEPATITIS C ANTIBODY: NONREACTIVE
HIV AG/AB: NONREACTIVE
LDL CHOLESTEROL: 182 MG/DL (ref 0–130)
MICROALBUMIN/CREAT 24H UR: <12 MG/L
MICROALBUMIN/CREAT UR-RTO: NORMAL MCG/MG CREAT
POTASSIUM SERPL-SCNC: 4.7 MMOL/L (ref 3.7–5.3)
SODIUM BLD-SCNC: 146 MMOL/L (ref 135–144)
TOTAL PROTEIN: 7.1 G/DL (ref 6.4–8.3)
TRIGL SERPL-MCNC: 153 MG/DL
VLDLC SERPL CALC-MCNC: ABNORMAL MG/DL (ref 1–30)

## 2019-03-04 PROCEDURE — 99214 OFFICE O/P EST MOD 30 MIN: CPT | Performed by: FAMILY MEDICINE

## 2019-03-04 RX ORDER — HYDROCODONE BITARTRATE AND ACETAMINOPHEN 5; 325 MG/1; MG/1
1-2 TABLET ORAL 4 TIMES DAILY PRN
Qty: 60 TABLET | Refills: 0 | Status: SHIPPED | OUTPATIENT
Start: 2019-03-04 | End: 2019-03-19

## 2019-03-04 ASSESSMENT — PATIENT HEALTH QUESTIONNAIRE - PHQ9
2. FEELING DOWN, DEPRESSED OR HOPELESS: 0
SUM OF ALL RESPONSES TO PHQ9 QUESTIONS 1 & 2: 0
SUM OF ALL RESPONSES TO PHQ QUESTIONS 1-9: 0
SUM OF ALL RESPONSES TO PHQ QUESTIONS 1-9: 0
1. LITTLE INTEREST OR PLEASURE IN DOING THINGS: 0

## 2019-03-04 ASSESSMENT — ENCOUNTER SYMPTOMS
GASTROINTESTINAL NEGATIVE: 1
RESPIRATORY NEGATIVE: 1

## 2019-06-10 RX ORDER — GABAPENTIN 400 MG/1
400 CAPSULE ORAL 2 TIMES DAILY
Qty: 60 CAPSULE | Refills: 11 | Status: SHIPPED | OUTPATIENT
Start: 2019-06-10 | End: 2020-05-27

## 2019-06-10 NOTE — TELEPHONE ENCOUNTER
Next Visit Date:  No future appointments. Health Maintenance   Topic Date Due    Pneumococcal 0-64 years Vaccine (1 of 1 - PPSV23) 09/21/1962    Shingles Vaccine (1 of 2) 09/21/2006    Low dose CT lung screening  09/21/2011    Diabetic retinal exam  03/20/2018    DTaP/Tdap/Td vaccine (1 - Tdap) 12/01/2023 (Originally 9/21/1975)    Flu vaccine (Season Ended) 12/01/2023 (Originally 9/1/2019)    Diabetic foot exam  03/04/2020    A1C test (Diabetic or Prediabetic)  03/04/2020    Diabetic microalbuminuria test  03/04/2020    Lipid screen  03/04/2020    Potassium monitoring  03/04/2020    Creatinine monitoring  03/04/2020    Colon cancer screen colonoscopy  08/26/2024    Hepatitis C screen  Completed    HIV screen  Completed       Hemoglobin A1C (%)   Date Value   03/04/2019 6.8 (H)   05/21/2018 6.0   07/17/2017 6.1             ( goal A1C is < 7)   Microalb/Crt.  Ratio (mcg/mg creat)   Date Value   03/04/2019 CANNOT BE CALCULATED     LDL Cholesterol (mg/dL)   Date Value   03/04/2019 182 (H)     LDL Calculated (mg/dL)   Date Value   07/17/2017 92   09/08/2015 111       (goal LDL is <100)   AST (U/L)   Date Value   03/04/2019 24     ALT (U/L)   Date Value   03/04/2019 22     BUN (mg/dL)   Date Value   03/04/2019 24 (H)     BP Readings from Last 3 Encounters:   03/04/19 118/68   05/21/18 110/64   10/30/17 110/60          (goal 120/80)    All Future Testing planned in [facility]  Lab Frequency Next Occurrence               Patient Active Problem List:     Hyperlipidemia     Obesity     OA (osteoarthritis) of knee     S/P left knee arthroscopy     Benign essential hypertension     Sleep apnea     Deep vein thrombosis (HCC)     Hip pain     [place] filter in place     Diverticulosis     Diabetes ([address]     Gastroesophageal reflux disease without esophagitis     Chronic pain

## 2019-08-08 ENCOUNTER — TELEPHONE (OUTPATIENT)
Dept: FAMILY MEDICINE CLINIC | Age: 64
End: 2019-08-08

## 2019-08-12 RX ORDER — PREDNISONE 20 MG/1
TABLET ORAL
Qty: 18 TABLET | Refills: 0 | Status: SHIPPED | OUTPATIENT
Start: 2019-08-12 | End: 2019-08-22

## 2019-09-30 DIAGNOSIS — N40.1 BENIGN NON-NODULAR PROSTATIC HYPERPLASIA WITH LOWER URINARY TRACT SYMPTOMS: ICD-10-CM

## 2019-09-30 RX ORDER — LISINOPRIL AND HYDROCHLOROTHIAZIDE 20; 12.5 MG/1; MG/1
1 TABLET ORAL DAILY
Qty: 90 TABLET | Refills: 0 | Status: SHIPPED | OUTPATIENT
Start: 2019-09-30 | End: 2019-10-17 | Stop reason: SDUPTHER

## 2019-09-30 RX ORDER — AMLODIPINE BESYLATE 10 MG/1
10 TABLET ORAL DAILY
Qty: 90 TABLET | Refills: 0 | Status: SHIPPED | OUTPATIENT
Start: 2019-09-30 | End: 2019-10-17 | Stop reason: SDUPTHER

## 2019-09-30 RX ORDER — GABAPENTIN 400 MG/1
400 CAPSULE ORAL 2 TIMES DAILY
Qty: 60 CAPSULE | Refills: 0 | OUTPATIENT
Start: 2019-09-30 | End: 2019-10-30

## 2019-09-30 RX ORDER — TAMSULOSIN HYDROCHLORIDE 0.4 MG/1
0.4 CAPSULE ORAL DAILY
Qty: 90 CAPSULE | Refills: 0 | Status: SHIPPED | OUTPATIENT
Start: 2019-09-30 | End: 2019-10-17 | Stop reason: SDUPTHER

## 2019-10-17 ENCOUNTER — OFFICE VISIT (OUTPATIENT)
Dept: FAMILY MEDICINE CLINIC | Age: 64
End: 2019-10-17
Payer: COMMERCIAL

## 2019-10-17 VITALS
OXYGEN SATURATION: 93 % | BODY MASS INDEX: 34.58 KG/M2 | HEART RATE: 74 BPM | WEIGHT: 255 LBS | DIASTOLIC BLOOD PRESSURE: 62 MMHG | SYSTOLIC BLOOD PRESSURE: 110 MMHG | RESPIRATION RATE: 18 BRPM

## 2019-10-17 DIAGNOSIS — M54.50 ACUTE BILATERAL LOW BACK PAIN WITHOUT SCIATICA: ICD-10-CM

## 2019-10-17 DIAGNOSIS — E78.2 MIXED HYPERLIPIDEMIA: ICD-10-CM

## 2019-10-17 DIAGNOSIS — G47.33 OBSTRUCTIVE SLEEP APNEA SYNDROME: ICD-10-CM

## 2019-10-17 DIAGNOSIS — Z86.718 HISTORY OF RECURRENT DEEP VEIN THROMBOSIS (DVT): ICD-10-CM

## 2019-10-17 DIAGNOSIS — R39.198 DIFFICULTY URINATING: ICD-10-CM

## 2019-10-17 DIAGNOSIS — E11.9 TYPE 2 DIABETES MELLITUS WITHOUT COMPLICATION, WITHOUT LONG-TERM CURRENT USE OF INSULIN (HCC): Primary | ICD-10-CM

## 2019-10-17 DIAGNOSIS — N40.1 BENIGN PROSTATIC HYPERPLASIA WITH LOWER URINARY TRACT SYMPTOMS, SYMPTOM DETAILS UNSPECIFIED: ICD-10-CM

## 2019-10-17 DIAGNOSIS — I10 BENIGN ESSENTIAL HYPERTENSION: ICD-10-CM

## 2019-10-17 LAB — HBA1C MFR BLD: 6.5 %

## 2019-10-17 PROCEDURE — 83036 HEMOGLOBIN GLYCOSYLATED A1C: CPT | Performed by: NURSE PRACTITIONER

## 2019-10-17 PROCEDURE — 99215 OFFICE O/P EST HI 40 MIN: CPT | Performed by: NURSE PRACTITIONER

## 2019-10-17 RX ORDER — ATORVASTATIN CALCIUM 20 MG/1
20 TABLET, FILM COATED ORAL DAILY
Qty: 30 TABLET | Refills: 5 | Status: SHIPPED | OUTPATIENT
Start: 2019-10-17 | End: 2019-10-29

## 2019-10-17 RX ORDER — AMLODIPINE BESYLATE 10 MG/1
10 TABLET ORAL DAILY
Qty: 90 TABLET | Refills: 3 | Status: SHIPPED | OUTPATIENT
Start: 2019-10-17 | End: 2020-10-12 | Stop reason: SDUPTHER

## 2019-10-17 RX ORDER — TAMSULOSIN HYDROCHLORIDE 0.4 MG/1
0.4 CAPSULE ORAL DAILY
Qty: 90 CAPSULE | Refills: 3 | Status: SHIPPED | OUTPATIENT
Start: 2019-10-17 | End: 2020-06-08 | Stop reason: ALTCHOICE

## 2019-10-17 RX ORDER — DUTASTERIDE 0.5 MG/1
0.5 CAPSULE, LIQUID FILLED ORAL DAILY
Qty: 90 CAPSULE | Refills: 3 | Status: SHIPPED | OUTPATIENT
Start: 2019-10-17 | End: 2020-05-27

## 2019-10-17 RX ORDER — LISINOPRIL AND HYDROCHLOROTHIAZIDE 20; 12.5 MG/1; MG/1
1 TABLET ORAL DAILY
Qty: 90 TABLET | Refills: 3 | Status: SHIPPED | OUTPATIENT
Start: 2019-10-17 | End: 2020-10-12 | Stop reason: SDUPTHER

## 2019-10-17 RX ORDER — HYDROCODONE BITARTRATE AND ACETAMINOPHEN 5; 325 MG/1; MG/1
1 TABLET ORAL EVERY 12 HOURS PRN
Qty: 10 TABLET | Refills: 0 | Status: SHIPPED | OUTPATIENT
Start: 2019-10-17 | End: 2019-10-22

## 2019-10-17 ASSESSMENT — ENCOUNTER SYMPTOMS
COUGH: 0
BLOOD IN STOOL: 0
COLOR CHANGE: 0
ANAL BLEEDING: 0
ALLERGIC/IMMUNOLOGIC NEGATIVE: 1
RESPIRATORY NEGATIVE: 1
DIARRHEA: 0
WHEEZING: 0
GASTROINTESTINAL NEGATIVE: 1
VOMITING: 0
EYES NEGATIVE: 1
SHORTNESS OF BREATH: 0
NAUSEA: 0

## 2019-10-21 ENCOUNTER — OFFICE VISIT (OUTPATIENT)
Dept: UROLOGY | Age: 64
End: 2019-10-21
Payer: COMMERCIAL

## 2019-10-21 VITALS
HEART RATE: 76 BPM | TEMPERATURE: 98.1 F | BODY MASS INDEX: 33.97 KG/M2 | DIASTOLIC BLOOD PRESSURE: 60 MMHG | SYSTOLIC BLOOD PRESSURE: 104 MMHG | OXYGEN SATURATION: 96 % | HEIGHT: 72 IN | WEIGHT: 250.8 LBS

## 2019-10-21 DIAGNOSIS — Z12.5 PROSTATE CANCER SCREENING: ICD-10-CM

## 2019-10-21 DIAGNOSIS — R39.12 WEAK URINARY STREAM: ICD-10-CM

## 2019-10-21 DIAGNOSIS — N13.8 HYPERTROPHY OF PROSTATE WITH URINARY OBSTRUCTION: Primary | ICD-10-CM

## 2019-10-21 DIAGNOSIS — N40.1 HYPERTROPHY OF PROSTATE WITH URINARY OBSTRUCTION: Primary | ICD-10-CM

## 2019-10-21 PROCEDURE — 99204 OFFICE O/P NEW MOD 45 MIN: CPT | Performed by: UROLOGY

## 2019-10-21 ASSESSMENT — ENCOUNTER SYMPTOMS
EYE PAIN: 0
SHORTNESS OF BREATH: 0
WHEEZING: 0
ABDOMINAL PAIN: 0
BACK PAIN: 0
COLOR CHANGE: 0
COUGH: 0
VOMITING: 0
EYE REDNESS: 0
NAUSEA: 0

## 2019-10-29 ENCOUNTER — TELEPHONE (OUTPATIENT)
Dept: FAMILY MEDICINE CLINIC | Age: 64
End: 2019-10-29

## 2019-10-29 DIAGNOSIS — E78.2 MIXED HYPERLIPIDEMIA: ICD-10-CM

## 2019-10-29 RX ORDER — ATORVASTATIN CALCIUM 20 MG/1
10 TABLET, FILM COATED ORAL DAILY
Qty: 30 TABLET | Refills: 5
Start: 2019-10-29 | End: 2020-05-27

## 2019-12-16 LAB
A/G RATIO: NORMAL
ALBUMIN SERPL-MCNC: 4 G/DL
ALP BLD-CCNC: 65 U/L
ALT SERPL-CCNC: 20 U/L
AST SERPL-CCNC: 23 U/L
BILIRUB SERPL-MCNC: 0.6 MG/DL (ref 0.1–1.4)
BILIRUBIN DIRECT: 0.1 MG/DL
BILIRUBIN, INDIRECT: NORMAL
CHOLESTEROL, TOTAL: 163 MG/DL
CHOLESTEROL/HDL RATIO: 3.8
GLOBULIN: NORMAL
HDLC SERPL-MCNC: 43 MG/DL (ref 35–70)
LDL CHOLESTEROL CALCULATED: 103 MG/DL (ref 0–160)
PROTEIN TOTAL: 6.5 G/DL
TRIGL SERPL-MCNC: 85 MG/DL
VLDLC SERPL CALC-MCNC: 17 MG/DL

## 2019-12-27 DIAGNOSIS — E78.2 MIXED HYPERLIPIDEMIA: ICD-10-CM

## 2020-04-20 ENCOUNTER — TELEPHONE (OUTPATIENT)
Dept: UROLOGY | Age: 65
End: 2020-04-20

## 2020-05-27 ENCOUNTER — OFFICE VISIT (OUTPATIENT)
Dept: FAMILY MEDICINE CLINIC | Age: 65
End: 2020-05-27
Payer: COMMERCIAL

## 2020-05-27 VITALS
OXYGEN SATURATION: 98 % | BODY MASS INDEX: 34.18 KG/M2 | WEIGHT: 252 LBS | TEMPERATURE: 97.7 F | HEART RATE: 70 BPM | DIASTOLIC BLOOD PRESSURE: 80 MMHG | SYSTOLIC BLOOD PRESSURE: 118 MMHG

## 2020-05-27 PROBLEM — E11.9 CONTROLLED TYPE 2 DIABETES MELLITUS WITHOUT COMPLICATION, WITHOUT LONG-TERM CURRENT USE OF INSULIN (HCC): Status: ACTIVE | Noted: 2020-05-27

## 2020-05-27 PROCEDURE — 99213 OFFICE O/P EST LOW 20 MIN: CPT | Performed by: NURSE PRACTITIONER

## 2020-05-27 RX ORDER — PRAVASTATIN SODIUM 80 MG/1
40 TABLET ORAL DAILY
COMMUNITY
End: 2020-10-12 | Stop reason: SDUPTHER

## 2020-05-27 ASSESSMENT — PATIENT HEALTH QUESTIONNAIRE - PHQ9
1. LITTLE INTEREST OR PLEASURE IN DOING THINGS: 0
SUM OF ALL RESPONSES TO PHQ9 QUESTIONS 1 & 2: 0
SUM OF ALL RESPONSES TO PHQ QUESTIONS 1-9: 0
2. FEELING DOWN, DEPRESSED OR HOPELESS: 0
SUM OF ALL RESPONSES TO PHQ QUESTIONS 1-9: 0

## 2020-05-27 ASSESSMENT — ENCOUNTER SYMPTOMS
ALLERGIC/IMMUNOLOGIC NEGATIVE: 1
BACK PAIN: 0
CHEST TIGHTNESS: 0
COLOR CHANGE: 0
SHORTNESS OF BREATH: 0
EYES NEGATIVE: 1
RESPIRATORY NEGATIVE: 1
GASTROINTESTINAL NEGATIVE: 1
COUGH: 0

## 2020-05-27 NOTE — PROGRESS NOTES
[facility]  [address] [place] [address]  Dept: [phone]    Judith Cui is a 62 y.o. male who presents today for his medical conditions/complaintsas noted below. Judith Cui is here today c/o Leg Pain (left inner thigh from groin to knee. history of blood clots)    Past Medical History:   Diagnosis Date    Borderline diabetes     Deep vein thrombosis (HCC)     x 2    Angelina filter in place     Hip pain     Hyperlipidemia     Hypertension     Obesity     Unspecified sleep apnea       Past Surgical History:   Procedure Laterality Date    COLONOSCOPY  14    KNEE ARTHROSCOPY Left     TONSILLECTOMY      VENA CAVA FILTER PLACEMENT      [place] Filter       Family History   Problem Relation Age of Onset    Diabetes Mother         lung/liver cancer    Cancer Mother         prostate cancer    Cancer Father        Social History     Tobacco Use    Smoking status: Former Smoker     Packs/day: 1.50     Years: 33.00     Pack years: 49.50     Types: Cigarettes     Last attempt to quit: 2007     Years since quittin.2    Smokeless tobacco: Never Used   Substance Use Topics    Alcohol use: Yes     Alcohol/week: 3.3 - 4.2 standard drinks     Types: 4 - 5 Standard drinks or equivalent per week     Comment: occasional / weekends       Current Outpatient Medications   Medication Sig Dispense Refill    metFORMIN (GLUCOPHAGE) 500 MG tablet Take 1 tablet by mouth daily 90 tablet 3    lisinopril-hydrochlorothiazide (PRINZIDE;ZESTORETIC) 20-12.5 MG per tablet Take 1 tablet by mouth daily 90 tablet 3    tamsulosin (FLOMAX) 0.4 MG capsule Take 1 capsule by mouth daily 90 capsule 3    apixaban (ELIQUIS) 5 MG TABS tablet Take 1 tablet by mouth 2 times daily 180 tablet 3    amLODIPine (NORVASC) 10 MG tablet Take 1 tablet by mouth daily 90 tablet 3     No current facility-administered medications for this visit.       No Known Allergies      HPI: HPI    Presents today c/o L sided leg/thigh pain  Symptoms started 4 days ago   Declines any known injury   Symptoms start on the medial aspect of thigh above the knee and radiates to below L groin area   Pain is intermittent , described as cramping   Certain leg movements make symptoms worse , but did happen while laying in bed once  Patient tried one leftover Norco with mild relief   Cannot take NSAID's due to Eliquis   Patient declines associated fever/chills, shortness of breath, chest pain, dizziness, redness, swelling, hip or back pain etc.   Patient has h/o DVT / PE x 2 , anticoagulated on Eliquis which patient is complaint with   H/o Angelina filter     Health Maintenance:      Subjective:     Review of Systems   Constitutional: Negative. Negative for appetite change, chills, diaphoresis and fever. HENT: Negative. Eyes: Negative. Respiratory: Negative. Negative for cough, chest tightness and shortness of breath. Cardiovascular: Negative. Negative for chest pain and leg swelling. Gastrointestinal: Negative. Endocrine: Negative. Genitourinary: Negative. Musculoskeletal: Positive for myalgias. Negative for arthralgias and back pain. Skin: Negative. Negative for color change, pallor, rash and wound. Allergic/Immunologic: Negative. Neurological: Negative. Negative for weakness and numbness. Hematological: Negative. Psychiatric/Behavioral: Negative. Objective:     Vitals:    05/27/20 1134   BP: 118/80   Pulse: 70   Temp: 97.7 °F (36.5 °C)   SpO2: 98%       Body mass index is 34.18 kg/m². Physical Exam  Constitutional:       General: He is not in acute distress. Appearance: He is well-developed. He is not diaphoretic. HENT:      Head: Normocephalic and atraumatic. Right Ear: External ear normal.      Left Ear: External ear normal.      Nose: Nose normal.   Eyes:      General: No scleral icterus. Right eye: No discharge.          Left eye: No discharge. Conjunctiva/sclera: Conjunctivae normal.      Pupils: Pupils are equal, round, and reactive to light. Neck:      Musculoskeletal: Normal range of motion and neck supple. Trachea: No tracheal deviation. Cardiovascular:      Rate and Rhythm: Normal rate and regular rhythm. Heart sounds: Normal heart sounds. No murmur. No friction rub. No gallop. Pulmonary:      Effort: Pulmonary effort is normal. No tachypnea, accessory muscle usage or respiratory distress. Breath sounds: Normal breath sounds. No stridor. No decreased breath sounds, wheezing, rhonchi or rales. Abdominal:      Palpations: Abdomen is soft. Musculoskeletal: Normal range of motion. General: No deformity. Left hip: He exhibits normal range of motion, no tenderness, no bony tenderness and no swelling. Left knee: He exhibits swelling. He exhibits no ecchymosis, no deformity, no laceration, no erythema, normal alignment, no LCL laxity, normal patellar mobility and no bony tenderness. Tenderness found. Medial joint line ( + crepitus) tenderness noted. Left lower leg: He exhibits no tenderness, no bony tenderness, no swelling, no deformity and no laceration. No edema. Left foot: Normal range of motion and normal capillary refill. No swelling. Comments: Saúl's test negative  No edema or erythema of lower extremity  No tenderness to calf    Skin:     General: Skin is warm and dry. Coloration: Skin is not pale. Findings: No erythema or rash. Neurological:      Mental Status: He is alert and oriented to person, place, and time. GCS: GCS eye subscore is 4. GCS verbal subscore is 5. GCS motor subscore is 6. Motor: No tremor, atrophy or seizure activity. Psychiatric:         Speech: Speech normal.         Behavior: Behavior normal.         Thought Content: Thought content normal.         Judgment: Judgment normal.           Assessment:         1.  Pain of left lower

## 2020-06-01 LAB
ALBUMIN SERPL-MCNC: 4.2 G/DL
ALP BLD-CCNC: 59 U/L
ALT SERPL-CCNC: 16 U/L
ANION GAP SERPL CALCULATED.3IONS-SCNC: 9 MMOL/L
AST SERPL-CCNC: 18 U/L
AVERAGE GLUCOSE: 137
BASOPHILS ABSOLUTE: 0 /ΜL
BASOPHILS RELATIVE PERCENT: 0.7 %
BILIRUB SERPL-MCNC: 0.5 MG/DL (ref 0.1–1.4)
BUN BLDV-MCNC: 27 MG/DL
CALCIUM SERPL-MCNC: 9.1 MG/DL
CHLORIDE BLD-SCNC: 104 MMOL/L
CHOLESTEROL, FASTING: 162
CO2: 29 MMOL/L
CREAT SERPL-MCNC: 1.08 MG/DL
CREATININE URINE: 109.18 MG/DL
EOSINOPHILS ABSOLUTE: 0.2 /ΜL
EOSINOPHILS RELATIVE PERCENT: 3.5 %
GFR CALCULATED: >60
GLUCOSE BLD-MCNC: 128 MG/DL
HBA1C MFR BLD: 6.4 %
HCT VFR BLD CALC: 40.7 % (ref 41–53)
HDLC SERPL-MCNC: 43 MG/DL (ref 35–70)
HEMOGLOBIN: 13.8 G/DL (ref 13.5–17.5)
LDL CHOLESTEROL CALCULATED: 96 MG/DL (ref 0–160)
LYMPHOCYTES ABSOLUTE: 2.2 /ΜL
LYMPHOCYTES RELATIVE PERCENT: 39.3 %
MCH RBC QN AUTO: 30.6 PG
MCHC RBC AUTO-ENTMCNC: 33.9 G/DL
MCV RBC AUTO: 90 FL
MICROALBUMIN/CREAT 24H UR: <0.7 MG/G{CREAT}
MONOCYTES ABSOLUTE: 0.5 /ΜL
MONOCYTES RELATIVE PERCENT: 9 %
NEUTROPHILS ABSOLUTE: 2.7 /ΜL
NEUTROPHILS RELATIVE PERCENT: 47.5 %
PDW BLD-RTO: 12.9 %
PLATELET # BLD: 128 K/ΜL
PMV BLD AUTO: 11.1 FL
POTASSIUM SERPL-SCNC: 4.1 MMOL/L
RBC # BLD: 4.51 10^6/ΜL
SODIUM BLD-SCNC: 142 MMOL/L
TOTAL PROTEIN: 6.7
TRIGLYCERIDE, FASTING: 117
WBC # BLD: 5.6 10^3/ML

## 2020-06-08 ENCOUNTER — OFFICE VISIT (OUTPATIENT)
Dept: FAMILY MEDICINE CLINIC | Age: 65
End: 2020-06-08
Payer: COMMERCIAL

## 2020-06-08 VITALS
HEART RATE: 67 BPM | BODY MASS INDEX: 34.58 KG/M2 | TEMPERATURE: 97.1 F | SYSTOLIC BLOOD PRESSURE: 138 MMHG | WEIGHT: 255 LBS | OXYGEN SATURATION: 97 % | DIASTOLIC BLOOD PRESSURE: 74 MMHG

## 2020-06-08 PROCEDURE — 99214 OFFICE O/P EST MOD 30 MIN: CPT | Performed by: NURSE PRACTITIONER

## 2020-06-08 RX ORDER — LANCETS 30 GAUGE
1 EACH MISCELLANEOUS DAILY
Qty: 100 EACH | Refills: 11 | Status: SHIPPED | OUTPATIENT
Start: 2020-06-08 | End: 2021-06-14

## 2020-06-08 RX ORDER — BLOOD-GLUCOSE METER
KIT MISCELLANEOUS
Qty: 1 KIT | Refills: 0 | Status: SHIPPED | OUTPATIENT
Start: 2020-06-08 | End: 2021-06-14

## 2020-06-08 RX ORDER — TAMSULOSIN HYDROCHLORIDE 0.4 MG/1
0.4 CAPSULE ORAL DAILY
Qty: 90 CAPSULE | Refills: 3 | COMMUNITY
Start: 2020-06-08 | End: 2021-06-14 | Stop reason: SDUPTHER

## 2020-06-08 RX ORDER — SYRING-NEEDL,DISP,INSUL,0.3 ML 30 GX5/16"
1 SYRINGE, EMPTY DISPOSABLE MISCELLANEOUS DAILY
Qty: 1 EACH | Refills: 0 | Status: SHIPPED | OUTPATIENT
Start: 2020-06-08 | End: 2021-06-14

## 2020-06-08 ASSESSMENT — ENCOUNTER SYMPTOMS
DIARRHEA: 0
ABDOMINAL PAIN: 0
RESPIRATORY NEGATIVE: 1
ALLERGIC/IMMUNOLOGIC NEGATIVE: 1
ANAL BLEEDING: 0
CHEST TIGHTNESS: 0
SHORTNESS OF BREATH: 0
VOMITING: 0
WHEEZING: 0
BLOOD IN STOOL: 0
COLOR CHANGE: 0
EYES NEGATIVE: 1
NAUSEA: 0
COUGH: 0

## 2020-06-08 NOTE — PROGRESS NOTES
by mouth daily 90 tablet 3    amLODIPine (NORVASC) 10 MG tablet Take 1 tablet by mouth daily 90 tablet 3    lisinopril-hydrochlorothiazide (PRINZIDE;ZESTORETIC) 20-12.5 MG per tablet Take 1 tablet by mouth daily 90 tablet 3    apixaban (ELIQUIS) 5 MG TABS tablet Take 1 tablet by mouth 2 times daily 180 tablet 3     No current facility-administered medications for this visit. No Known Allergies      HPI:     HPI    Presents today c/o routine dm2 / LAB work follow-up  Specialists - [facility] ([facility])   Works as  at night    DM2 - Metformin 500 mg qd  Most recent A1C 6.4 % , didn't know he was diabetic  Has been on Metformin for years   Declines medication side effects   Patient does not monitor blood sugars at home   Declines diabetic neuropathy or other complications   Weight has been stable  Does not follow diabetic diet , has issues with portion control      H/o DVT x 2 - related to prolonged sitting  H/o heavy smoking   Anticoagulated with Eliquis   Has [employer] filter   Declines lung ca. Screen      [facility] - complaint with CPAP machine monitored per work     HTN - amlodipine , lisinopril/hctz   Declines related symptoms including chest pain, shortness of breath, palpitations, syncope  Occ orthostatic dizziness with position changes      Hyperlipidemia - pravastatin 40 mg qd     H/o difficulty urinating q AM - Flomax  Saw Urology a couple of months ago for evaluation     H/o arthritis of R knee  C/o bothersome pain that started more recent  X-rays in Epic  H/o arthroscopic surgery in the past     Health Maintenance:      Subjective:     Review of Systems   Constitutional: Negative. Negative for appetite change, chills, diaphoresis, fatigue, fever and unexpected weight change (slow increase). HENT: Negative. Eyes: Negative. Respiratory: Negative. Negative for cough, chest tightness, shortness of breath and wheezing. Cardiovascular: Negative.   Negative for chest pain, palpitations and leg swelling. Gastrointestinal: Negative for abdominal pain, anal bleeding, blood in stool, diarrhea, nausea and vomiting. Endocrine: Negative. Genitourinary: Positive for difficulty urinating (difficulty urinating - first thing in the AM). Musculoskeletal: Positive for arthralgias. Skin: Negative. Negative for color change, pallor, rash and wound. Allergic/Immunologic: Negative. Neurological: Negative. Negative for dizziness, seizures, syncope, speech difficulty, weakness, light-headedness, numbness and headaches. Hematological: Negative. Psychiatric/Behavioral: Negative. Negative for sleep disturbance. Objective:     Vitals:    06/08/20 0800   BP: 138/74   Pulse: 67   Temp: 97.1 °F (36.2 °C)   SpO2: 97%       Body mass index is 34.58 kg/m². Physical Exam  Constitutional:       General: He is not in acute distress. Appearance: Normal appearance. He is well-developed. He is obese. He is not ill-appearing, toxic-appearing or diaphoretic. HENT:      Head: Normocephalic and atraumatic. Right Ear: External ear normal.      Left Ear: External ear normal.      Nose: Nose normal.   Eyes:      General: No scleral icterus. Right eye: No discharge. Left eye: No discharge. Conjunctiva/sclera: Conjunctivae normal.      Pupils: Pupils are equal, round, and reactive to light. Neck:      Musculoskeletal: Normal range of motion and neck supple. Trachea: No tracheal deviation. Cardiovascular:      Rate and Rhythm: Normal rate and regular rhythm. Heart sounds: Normal heart sounds. No murmur. No friction rub. No gallop. Pulmonary:      Effort: Pulmonary effort is normal. No tachypnea, accessory muscle usage or respiratory distress. Breath sounds: Normal breath sounds. No stridor. No decreased breath sounds, wheezing, rhonchi or rales. Abdominal:      Palpations: Abdomen is soft. Musculoskeletal: Normal range of motion. General: No tenderness or deformity. Skin:     General: Skin is warm and dry. Coloration: Skin is not pale. Findings: No erythema or rash. Neurological:      Mental Status: He is alert and oriented to person, place, and time. GCS: GCS eye subscore is 4. GCS verbal subscore is 5. GCS motor subscore is 6. Psychiatric:         Speech: Speech normal.         Behavior: Behavior normal.         Thought Content: Thought content normal.         Judgment: Judgment normal.       Feet-    Skin - no wounds noted. No calluses. No scaling. Normal color  Vascular -   capillary refill - good   Dorsalis pedis pulse - equal and strong   Sensory - normal sensory exam   Toenails - clubbing noted     Assessment:         1. Controlled type 2 diabetes mellitus without complication, without long-term current use of insulin ([facility] [address]    2. Benign essential hypertension    3. Mixed hyperlipidemia    4. History of recurrent deep vein thrombosis (DVT)    5. Primary osteoarthritis of left knee    6. Arthritis of hip    7. Abnormal CBC    8. Benign prostatic hyperplasia with lower urinary tract symptoms, symptom details unspecified        Plan:     1. Controlled type 2 diabetes mellitus without complication, without long-term current use of insulin ([facility])    -  DIABETES FOOT EXAM  - glucose monitoring kit (FREESTYLE) monitoring kit; 1 kit per insurance  Dispense: 1 kit; Refill: 0  - Lancets MISC; 1 each by Does not apply route daily  Dispense: 100 each; Refill: 11  - Lancet Device MISC; 1 each by Does not apply route daily May use any brand. Use q day  Dispense: 1 each; Refill: 0  - blood glucose test strips (ASCENSIA AUTODISC VI;ONE TOUCH ULTRA TEST VI) strip; Use with associated glucose meter. Use q day  Dispense: 100 strip;  Refill: 6    Well controlled on current medication  Labs up to date and reviewed   Encouraged diabetic education - pt declined  Encouraged periodic glucose monitoring     Instructed goal A1C

## 2020-10-12 ENCOUNTER — OFFICE VISIT (OUTPATIENT)
Dept: FAMILY MEDICINE CLINIC | Age: 65
End: 2020-10-12
Payer: COMMERCIAL

## 2020-10-12 VITALS
WEIGHT: 246 LBS | HEART RATE: 70 BPM | DIASTOLIC BLOOD PRESSURE: 70 MMHG | SYSTOLIC BLOOD PRESSURE: 120 MMHG | TEMPERATURE: 97.7 F | OXYGEN SATURATION: 96 % | BODY MASS INDEX: 33.36 KG/M2

## 2020-10-12 LAB
BASOPHILS ABSOLUTE: ABNORMAL
BASOPHILS RELATIVE PERCENT: ABNORMAL
EOSINOPHILS ABSOLUTE: ABNORMAL
EOSINOPHILS RELATIVE PERCENT: ABNORMAL
HBA1C MFR BLD: 6.2 %
HCT VFR BLD CALC: 41.2 % (ref 41–53)
HEMOGLOBIN: 13.9 G/DL (ref 13.5–17.5)
LYMPHOCYTES ABSOLUTE: ABNORMAL
LYMPHOCYTES RELATIVE PERCENT: ABNORMAL
MCH RBC QN AUTO: 30.9 PG
MCHC RBC AUTO-ENTMCNC: 33.8 G/DL
MCV RBC AUTO: 92 FL
MONOCYTES ABSOLUTE: ABNORMAL
MONOCYTES RELATIVE PERCENT: ABNORMAL
NEUTROPHILS ABSOLUTE: ABNORMAL
NEUTROPHILS RELATIVE PERCENT: ABNORMAL
PLATELET # BLD: 131 K/ΜL
PMV BLD AUTO: 11.8 FL
RBC # BLD: 4.5 10^6/ΜL
WBC # BLD: 4.8 10^3/ML

## 2020-10-12 PROCEDURE — 83036 HEMOGLOBIN GLYCOSYLATED A1C: CPT | Performed by: NURSE PRACTITIONER

## 2020-10-12 PROCEDURE — 99214 OFFICE O/P EST MOD 30 MIN: CPT | Performed by: NURSE PRACTITIONER

## 2020-10-12 RX ORDER — AMLODIPINE BESYLATE 10 MG/1
10 TABLET ORAL DAILY
Qty: 90 TABLET | Refills: 3 | Status: SHIPPED | OUTPATIENT
Start: 2020-10-12 | End: 2021-06-14 | Stop reason: SDUPTHER

## 2020-10-12 RX ORDER — PRAVASTATIN SODIUM 40 MG
40 TABLET ORAL DAILY
Qty: 90 TABLET | Refills: 3 | Status: SHIPPED | OUTPATIENT
Start: 2020-10-12 | End: 2021-06-14

## 2020-10-12 RX ORDER — LISINOPRIL AND HYDROCHLOROTHIAZIDE 20; 12.5 MG/1; MG/1
1 TABLET ORAL DAILY
Qty: 90 TABLET | Refills: 3 | Status: SHIPPED | OUTPATIENT
Start: 2020-10-12 | End: 2021-06-14 | Stop reason: SDUPTHER

## 2020-10-12 ASSESSMENT — ENCOUNTER SYMPTOMS
RESPIRATORY NEGATIVE: 1
NAUSEA: 0
GASTROINTESTINAL NEGATIVE: 1
EYES NEGATIVE: 1
COLOR CHANGE: 0
ALLERGIC/IMMUNOLOGIC NEGATIVE: 1
VOMITING: 0
DIARRHEA: 0

## 2020-10-12 ASSESSMENT — PATIENT HEALTH QUESTIONNAIRE - PHQ9
SUM OF ALL RESPONSES TO PHQ QUESTIONS 1-9: 0
2. FEELING DOWN, DEPRESSED OR HOPELESS: 0
SUM OF ALL RESPONSES TO PHQ QUESTIONS 1-9: 0
1. LITTLE INTEREST OR PLEASURE IN DOING THINGS: 0
SUM OF ALL RESPONSES TO PHQ9 QUESTIONS 1 & 2: 0

## 2020-10-12 NOTE — PROGRESS NOTES
500 MG tablet Take 1 tablet by mouth daily 90 tablet 3    amLODIPine (NORVASC) 10 MG tablet Take 1 tablet by mouth daily 90 tablet 3    lisinopril-hydrochlorothiazide (PRINZIDE;ZESTORETIC) 20-12.5 MG per tablet Take 1 tablet by mouth daily 90 tablet 3    apixaban (ELIQUIS) 5 MG TABS tablet Take 1 tablet by mouth 2 times daily 180 tablet 3     No current facility-administered medications for this visit. No Known Allergies      HPI:     HPI    Presents today c/o DM2 / HTN / HLD follow-up  Follows / [facility] ([place])  Orthopedics Dr. Concha Fernandes (knee arthritis)    DM2 - Metformin   Checks blood sugars occasionally , run's 's on average  Hypoglycemia none  Declines medication side effects   Declines diabetic neuropathy or other complications   Does get eyes checked routinely   Weight down 9 lb since last visit , more active   Does not follow diabetic diet     H/o DVT x 2    , sits for extended periods of times  [facility] [employer], has [employer] filter    H/o HTN - amlodipine, lisinopril/hctz   Doesn't monitor blood pressure at home     H/o HLD - pravastatin     H/o BPH - Flomax     Health Maintenance:      Subjective:     Review of Systems   Constitutional: Negative. Negative for appetite change, chills, diaphoresis, fever and unexpected weight change. HENT: Negative. Eyes: Negative. Negative for visual disturbance. Respiratory: Negative. Cardiovascular: Negative. Gastrointestinal: Negative. Negative for diarrhea, nausea and vomiting. Endocrine: Negative. Genitourinary: Negative. Musculoskeletal: Negative. Skin: Negative. Negative for color change, pallor, rash and wound. Allergic/Immunologic: Negative. Neurological: Negative. Negative for dizziness, seizures, syncope, speech difficulty, numbness and headaches. Hematological: Negative. Psychiatric/Behavioral: Negative. Negative for sleep disturbance.        Objective:     Vitals:    10/12/20 0751   BP: of recurrent deep vein thrombosis (DVT)    5. Obstructive sleep apnea syndrome        Plan:     1. Controlled type 2 diabetes mellitus without complication, without long-term current use of insulin (HCC)    - POCT glycosylated hemoglobin (Hb A1C)  - metFORMIN (GLUCOPHAGE) 500 MG tablet; Take 1 tablet by mouth daily  Dispense: 90 tablet; Refill: 3    A1C well controlled on current medication  Encouraged eye exam f/u   Encouraged diabetic diet  Discussed potential d/c Metformin in the future    Instructed goal A1C 6.5-7 %   Advised medication benefits and side effects   Advised dietary recommendations including avoidance of carbs and concentrated sweets  Importance of daily physical activity and weight loss in disease management  Discussed importance of statin and ASCVD benefits  Advised yearly diabetic eye examinations  Advised routine monitoring of feet  Importance to notify if hypoglycemia sx and directions  Importance of regular meals with insulin/medications   Adverse effects of uncontrolled diabetes including heart disease, CVA, kidney disease, neuropathy, retinopathy, PVD etc.  Importance of routine follow-up every 3 months is key    2. Benign essential hypertension    - lisinopril-hydroCHLOROthiazide (PRINZIDE;ZESTORETIC) 20-12.5 MG per tablet; Take 1 tablet by mouth daily  Dispense: 90 tablet; Refill: 3  - amLODIPine (NORVASC) 10 MG tablet; Take 1 tablet by mouth daily  Dispense: 90 tablet; Refill: 3    BP well controlled  Labs up to date and reviewed     3. Mixed hyperlipidemia    - pravastatin (PRAVACHOL) 40 MG tablet; Take 1 tablet by mouth daily  Dispense: 90 tablet; Refill: 3    Lipids controlled  Continue statin     4. History of recurrent deep vein thrombosis (DVT)    - apixaban (ELIQUIS) 5 MG TABS tablet; Take 1 tablet by mouth 2 times daily  Dispense: 180 tablet; Refill: 3    5.  Obstructive sleep apnea syndrome    Follows w/ Pulmonary    Declined flu shot     Discussed use, benefit, and side effects of prescribed medications. All patient questions answered. Pt voiced understanding. Reviewed health maintenance. Instructed to continue current medications, diet and exercise. Patient agreedwith treatment plan. Follow up as directed.      Electronically signed by ADRI Alejandro CNP on 10/12/2020

## 2020-11-09 ENCOUNTER — INITIAL CONSULT (OUTPATIENT)
Dept: ONCOLOGY | Age: 65
End: 2020-11-09
Payer: COMMERCIAL

## 2020-11-09 ENCOUNTER — TELEPHONE (OUTPATIENT)
Dept: ONCOLOGY | Age: 65
End: 2020-11-09

## 2020-11-09 VITALS
DIASTOLIC BLOOD PRESSURE: 68 MMHG | HEIGHT: 72 IN | WEIGHT: 242.2 LBS | TEMPERATURE: 98.1 F | SYSTOLIC BLOOD PRESSURE: 116 MMHG | BODY MASS INDEX: 32.8 KG/M2 | HEART RATE: 61 BPM

## 2020-11-09 PROBLEM — D69.6 THROMBOCYTOPENIA (HCC): Status: ACTIVE | Noted: 2020-11-09

## 2020-11-09 PROBLEM — Z83.49 FAMILY HISTORY OF HEMOCHROMATOSIS: Status: ACTIVE | Noted: 2020-11-09

## 2020-11-09 PROCEDURE — 99245 OFF/OP CONSLTJ NEW/EST HI 55: CPT | Performed by: INTERNAL MEDICINE

## 2020-11-09 NOTE — TELEPHONE ENCOUNTER
Pt was seen today by Dr. Adan Quiroga. Per avs,   Liver US soon  Labs soon  RV or virtual visit after tests results  BRIANNE Screen With Reflex     Ferritin     Hepatic Function Panel     Hepatitis Panel, Acute     Hered. Hemochromatosis, DNA     Iron and TIBC   US scheduled for 11-16-20 [facility] (pt will have labs drawn that day as well). Follow up scheduled for 11-23-20.

## 2020-11-09 NOTE — PROGRESS NOTES
_               Mr. Angela Baca is a very pleasant 60 y.o. male with history of multiple co morbidities as listed. The patient is referred for evaluation for the management of thrombocytopenia. Patient has history of bilateral DVTs and pulmonary embolism for about 8 years. Initially he was maintained on Coumadin and switched to Eliquis over the last 2 years. He never had any thrombosis events while on treatment. He had hypercoagulability work-up done in 2014 which was negative. Patient had a [place] filter inserted and was maintained on anticoagulation. Patient was noted to have mild thrombocytopenia. Patient has easy bruising secondary to anticoagulation. He denies any active bleeding. No nosebleed or gum bleed. No melena or hematochezia. No hematemesis. No fever. Patient quit smoking about 8 years ago. Denies alcohol drinking. Neal Carrera PAST MEDICAL HISTORY: has a past medical history of Deep vein thrombosis ([facility] [address] Diabetes type 2, controlled ([facility] [address] [place] filter in place, Hyperlipidemia, Hypertension, Obesity, and Unspecified sleep apnea. PAST SURGICAL HISTORY: has a past surgical history that includes Tonsillectomy; Knee arthroscopy (Left); Colonoscopy (8/26/14); and Vena Cava Filter Placement. CURRENT MEDICATIONS:  has a current medication list which includes the following prescription(s): apixaban, lisinopril-hydrochlorothiazide, amlodipine, metformin, pravastatin, tamsulosin, glucose monitoring kit, lancets, lancet device, and blood glucose test strips. ALLERGIES:  has No Known Allergies. FAMILY HISTORY: Family history of hemochromatosis with mother and sister. Otherwise negative for any hematological or oncological conditions. SOCIAL HISTORY:  reports that he quit smoking about 13 years ago. His smoking use included cigarettes. He has a 50.00 pack-year smoking history.  He has never used smokeless tobacco. He reports current alcohol use of about 3.3 - 4.2 standard drinks of alcohol per week. He reports that he does not use drugs. REVIEW OF SYSTEMS:     · General: No weakness or fatigue. No unanticipated weight loss or decreased appetite. No fever or chills. · Eyes: No blurred vision, eye pain or double vision. · Ears: No hearing problems or drainage. No tinnitus. · Throat: No sore throat, problems with swallowing or dysphagia. · Respiratory: No cough, sputum or hemoptysis. No shortness of breath. No pleuritic chest pain. · Cardiovascular: No chest pain, orthopnea or PND. No lower extremity edema. No palpitation. · Gastrointestinal: No problems with swallowing. No abdominal pain or bloating. No nausea or vomiting. No diarrhea or constipation. No GI bleeding. · Genitourinary: No dysuria, hematuria, frequency or urgency. · Musculoskeletal: No muscle aches or pains. No limitation of movement. No back pain. No gait disturbance, No joint complaints. · Dermatologic: No skin rashes or pruritus. No skin lesions or discolorations. · Psychiatric: No depression, anxiety, or stress or signs of schizophrenia. No change in mood or affect. · Hematologic: No history of bleeding tendency. No bruises or ecchymosis. No history of clotting problems. · Infectious disease: No fever, chills or frequent infections. · Endocrine: No polydipsia or polyuria. No temperature intolerance. · Neurologic: No headaches or dizziness. No weakness or numbness of the extremities. No changes in balance, coordination,  memory, mentation, behavior. · Allergic/Immunologic: No nasal congestion or hives. No repeated infections. PHYSICAL EXAM:  The patient is not in acute distress. Vital signs: Blood pressure 116/68, pulse 61, temperature 98.1 °F (36.7 °C), temperature source Oral, height 6' (1.829 m), weight 242 lb 3.2 oz (109.9 kg).      General appearance - well appearing, not in pain or distress  Mental status - good mood, alert and oriented  Eyes - pupils equal and reactive, extraocular eye movements intact  Ears - bilateral TM's and external ear canals normal  Nose - normal and patent, no erythema, discharge or polyps  Mouth - mucous membranes moist, pharynx normal without lesions  Neck - supple, no significant adenopathy  Lymphatics - no palpable lymphadenopathy, no hepatosplenomegaly  Chest - clear to auscultation, no wheezes, rales or rhonchi, symmetric air entry  Heart - normal rate, regular rhythm, normal S1, S2, no murmurs, rubs, clicks or gallops  Abdomen - soft, nontender, nondistended, no masses or organomegaly  Neurological - alert, oriented, normal speech, no focal findings or movement disorder noted  Musculoskeletal - no joint tenderness, deformity or swelling  Extremities - peripheral pulses normal, no pedal edema, no clubbing or cyanosis  Skin - normal coloration and turgor, no rashes, no suspicious skin lesions noted     Review of Diagnostic data:   Lab Results   Component Value Date    WBC 4.8 10/12/2020    HGB 13.9 (A) 10/12/2020    HCT 41.2 (A) 10/12/2020    MCV 92 10/12/2020     10/12/2020       Chemistry        Component Value Date/Time     06/01/2020    K 4.1 06/01/2020     06/01/2020    CO2 29 06/01/2020    BUN 27 06/01/2020    CREATININE 1.08 06/01/2020        Component Value Date/Time    CALCIUM 9.1 06/01/2020    ALKPHOS 59 06/01/2020    AST 18 06/01/2020    ALT 16 06/01/2020    BILITOT 0.5 06/01/2020            IMPRESSION:   Recurrent bilateral DVTs and pulmonary embolism  Negative hypercoagulability work-up  Lifelong anticoagulation candidate  Family history of hemochromatosis  Persistent mild thrombocytopenia      PLAN: For more than 60 minutes of face to face discussion, I explained to the patient the nature of these abnormalities and management plans. Obviously he had recurrent episodes of DVTs and pulmonary embolism.   Patient would be a candidate for lifelong anticoagulation despite negative hypercoagulability work-up due to the recurrent episodes of thrombosis. He will be continued on Eliquis. Tolerated well. Patient is having persistent mild thrombocytopenia with no contraindications for continued anticoagulation. The cause for this thrombocytopenia is not clear yet. Possibly related to liver disease or possible chronic immune mediated etiology. Concerned about patient's family history of hemochromatosis and possible personal involvement. Patient was never tested. So he could be having hemochromatosis with liver disease leading to thrombocytopenia. In any case we will investigate this further. We will do labs for hereditary hemochromatosis and will check hepatic function panel and acute hepatitis panel and alpha-fetoprotein. Will check ferritin and iron studies and will do ultrasound for the liver. We will make further recommendations based on the results. Patient's questions were answered to the best of his satisfaction and he verbalized full understanding and agreement.

## 2020-11-16 ENCOUNTER — HOSPITAL ENCOUNTER (OUTPATIENT)
Age: 65
Discharge: HOME | End: 2020-11-16
Payer: COMMERCIAL

## 2020-11-16 ENCOUNTER — HOSPITAL ENCOUNTER (OUTPATIENT)
Dept: ULTRASOUND IMAGING | Age: 65
Discharge: HOME | End: 2020-11-18
Payer: COMMERCIAL

## 2020-11-16 LAB
ALBUMIN SERPL-MCNC: 4.2 G/DL (ref 3.5–5.2)
ALBUMIN/GLOBULIN RATIO: ABNORMAL (ref 1–2.5)
ALP BLD-CCNC: 63 U/L (ref 40–129)
ALT SERPL-CCNC: 15 U/L (ref 5–41)
AST SERPL-CCNC: 21 U/L
BILIRUB SERPL-MCNC: 0.24 MG/DL (ref 0.3–1.2)
BILIRUBIN DIRECT: <0.08 MG/DL
BILIRUBIN, INDIRECT: ABNORMAL MG/DL (ref 0–1)
FERRITIN: 666 UG/L (ref 30–400)
GLOBULIN: ABNORMAL G/DL (ref 1.5–3.8)
HAV IGM SER IA-ACNC: NONREACTIVE
HEPATITIS B CORE IGM ANTIBODY: NONREACTIVE
HEPATITIS B SURFACE ANTIGEN: NONREACTIVE
HEPATITIS C ANTIBODY: NONREACTIVE
IRON SATURATION: 28 % (ref 20–55)
IRON: 70 UG/DL (ref 59–158)
TOTAL IRON BINDING CAPACITY: 249 UG/DL (ref 250–450)
TOTAL PROTEIN: 6.6 G/DL (ref 6.4–8.3)
UNSATURATED IRON BINDING CAPACITY: 179 UG/DL (ref 112–347)

## 2020-11-16 PROCEDURE — 80076 HEPATIC FUNCTION PANEL: CPT

## 2020-11-16 PROCEDURE — 80074 ACUTE HEPATITIS PANEL: CPT

## 2020-11-16 PROCEDURE — 83540 ASSAY OF IRON: CPT

## 2020-11-16 PROCEDURE — 83550 IRON BINDING TEST: CPT

## 2020-11-16 PROCEDURE — 36415 COLL VENOUS BLD VENIPUNCTURE: CPT

## 2020-11-16 PROCEDURE — 82728 ASSAY OF FERRITIN: CPT

## 2020-11-16 PROCEDURE — 86038 ANTINUCLEAR ANTIBODIES: CPT

## 2020-11-16 PROCEDURE — 81256 HFE GENE: CPT

## 2020-11-16 PROCEDURE — 76705 ECHO EXAM OF ABDOMEN: CPT

## 2020-11-17 LAB — ANTI-NUCLEAR ANTIBODY (ANA): NEGATIVE

## 2020-11-22 LAB
C282Y HEMOCHROMATOSIS MUT: NORMAL
H63D HEMOCHROMATOSIS MUT: NORMAL
HEMOCHROMATOSIS MUTATION INT: NORMAL
HEMOCHROMATOSIS SPECIMEN: NORMAL
S65C HEMOCHROMATOSIS MUT: NEGATIVE

## 2020-11-23 ENCOUNTER — OFFICE VISIT (OUTPATIENT)
Dept: ONCOLOGY | Age: 65
End: 2020-11-23
Payer: COMMERCIAL

## 2020-11-23 ENCOUNTER — TELEPHONE (OUTPATIENT)
Dept: ONCOLOGY | Age: 65
End: 2020-11-23

## 2020-11-23 VITALS
TEMPERATURE: 98.3 F | BODY MASS INDEX: 32.94 KG/M2 | HEART RATE: 66 BPM | WEIGHT: 242.9 LBS | DIASTOLIC BLOOD PRESSURE: 78 MMHG | SYSTOLIC BLOOD PRESSURE: 127 MMHG

## 2020-11-23 PROBLEM — E83.110 HEREDITARY HEMOCHROMATOSIS (HCC): Status: ACTIVE | Noted: 2020-11-23

## 2020-11-23 PROCEDURE — 99211 OFF/OP EST MAY X REQ PHY/QHP: CPT | Performed by: INTERNAL MEDICINE

## 2020-11-23 PROCEDURE — 99214 OFFICE O/P EST MOD 30 MIN: CPT | Performed by: INTERNAL MEDICINE

## 2020-11-23 NOTE — PROGRESS NOTES
_        Chief Complaint   Patient presents with    Follow-up     review status of disease    Results     go over ultrasound and labs     DIAGNOSIS:        Recurrent bilateral DVTs and pulmonary embolism  Negative hypercoagulability work-up  Lifelong anticoagulation candidate  Hereditary hemochromatosis  Persistent mild thrombocytopenia     CURRENT THERAPY:         Therapeutic phlebotomy as needed for hemochromatosis    BRIEF CASE HISTORY:      Mr. Brett Chu is a very pleasant 60 y.o. male with history of multiple co morbidities as listed. The patient is referred for evaluation for the management of thrombocytopenia. Patient has history of bilateral DVTs and pulmonary embolism for about 8 years. Initially he was maintained on Coumadin and switched to Eliquis over the last 2 years. He never had any thrombosis events while on treatment. He had hypercoagulability work-up done in 2014 which was negative. Patient had a [place] filter inserted and was maintained on anticoagulation. Patient was noted to have mild thrombocytopenia. Patient has easy bruising secondary to anticoagulation. He denies any active bleeding. No nosebleed or gum bleed. No melena or hematochezia. No hematemesis. No fever. Patient quit smoking about 8 years ago. Denies alcohol drinking. .   INTERIM HISTORY:   Patient seen for follow-up after lab work-up and liver ultrasound. Clinically stable with no complaints. No abdominal pain. No chest pain or shortness of breath. No headaches. No weakness or fatigue. No TIA or CVA-like symptoms. No other complaints. PAST MEDICAL HISTORY: has a past medical history of Deep vein thrombosis ([address] Diabetes type 2, controlled ([address] [place] filter in place, Hyperlipidemia, Hypertension, Obesity, and Unspecified sleep apnea.     PAST SURGICAL HISTORY: has a past surgical history that includes Tonsillectomy; Knee arthroscopy (Left); Colonoscopy (8/26/14); and Vena Cava Filter Placement. CURRENT MEDICATIONS:  has a current medication list which includes the following prescription(s): apixaban, lisinopril-hydrochlorothiazide, amlodipine, metformin, pravastatin, tamsulosin, glucose monitoring kit, lancets, lancet device, and blood glucose test strips. ALLERGIES:  has No Known Allergies. FAMILY HISTORY: Family history of hemochromatosis with mother and sister. Otherwise negative for any hematological or oncological conditions. SOCIAL HISTORY:  reports that he quit smoking about 13 years ago. His smoking use included cigarettes. He has a 50.00 pack-year smoking history. He has never used smokeless tobacco. He reports current alcohol use of about 3.3 - 4.2 standard drinks of alcohol per week. He reports that he does not use drugs. REVIEW OF SYSTEMS:     · General: No weakness or fatigue. No unanticipated weight loss or decreased appetite. No fever or chills. · Eyes: No blurred vision, eye pain or double vision. · Ears: No hearing problems or drainage. No tinnitus. · Throat: No sore throat, problems with swallowing or dysphagia. · Respiratory: No cough, sputum or hemoptysis. No shortness of breath. No pleuritic chest pain. · Cardiovascular: No chest pain, orthopnea or PND. No lower extremity edema. No palpitation. · Gastrointestinal: No problems with swallowing. No abdominal pain or bloating. No nausea or vomiting. No diarrhea or constipation. No GI bleeding. · Genitourinary: No dysuria, hematuria, frequency or urgency. · Musculoskeletal: No muscle aches or pains. No limitation of movement. No back pain. No gait disturbance, No joint complaints. · Dermatologic: No skin rashes or pruritus. No skin lesions or discolorations. · Psychiatric: No depression, anxiety, or stress or signs of schizophrenia. No change in mood or affect.     · Hematologic: No history of bleeding tendency. No bruises or ecchymosis. No history of clotting problems. · Infectious disease: No fever, chills or frequent infections. · Endocrine: No polydipsia or polyuria. No temperature intolerance. · Neurologic: No headaches or dizziness. No weakness or numbness of the extremities. No changes in balance, coordination,  memory, mentation, behavior. · Allergic/Immunologic: No nasal congestion or hives. No repeated infections. PHYSICAL EXAM:  The patient is not in acute distress. Vital signs: Blood pressure 127/78, pulse 66, temperature 98.3 °F (36.8 °C), temperature source Oral, weight 242 lb 14.4 oz (110.2 kg).      General appearance - well appearing, not in pain or distress  Mental status - good mood, alert and oriented  Eyes - pupils equal and reactive, extraocular eye movements intact  Ears - bilateral TM's and external ear canals normal  Nose - normal and patent, no erythema, discharge or polyps  Mouth - mucous membranes moist, pharynx normal without lesions  Neck - supple, no significant adenopathy  Lymphatics - no palpable lymphadenopathy, no hepatosplenomegaly  Chest - clear to auscultation, no wheezes, rales or rhonchi, symmetric air entry  Heart - normal rate, regular rhythm, normal S1, S2, no murmurs, rubs, clicks or gallops  Abdomen - soft, nontender, nondistended, no masses or organomegaly  Neurological - alert, oriented, normal speech, no focal findings or movement disorder noted  Musculoskeletal - no joint tenderness, deformity or swelling  Extremities - peripheral pulses normal, no pedal edema, no clubbing or cyanosis  Skin - normal coloration and turgor, no rashes, no suspicious skin lesions noted     Review of Diagnostic data:   Lab Results   Component Value Date    WBC 4.8 10/12/2020    HGB 13.9 (A) 10/12/2020    HCT 41.2 (A) 10/12/2020    MCV 92 10/12/2020     10/12/2020       Chemistry        Component Value Date/Time     06/01/2020    K 4.1 06/01/2020     06/01/2020    CO2 29 06/01/2020    BUN 27 06/01/2020    CREATININE 1.08 06/01/2020        Component Value Date/Time    CALCIUM 9.1 06/01/2020    ALKPHOS 63 11/16/2020 0809    AST 21 11/16/2020 0809    ALT 15 11/16/2020 0809    BILITOT 0.24 (L) 11/16/2020 0809            IMPRESSION:   Recurrent bilateral DVTs and pulmonary embolism  Negative hypercoagulability work-up  Lifelong anticoagulation candidate  Hereditary hemochromatosis  Persistent mild thrombocytopenia      PLAN: Labs were reviewed and explained to the patient. Genetic testing for hemochromatosis was positive. I explained to the patient the nature of these abnormalities and management plans. Patient's ferritin level is 666. We recommend therapeutic phlebotomy. It can be done every 3 months while monitoring ferritin level and hemoglobin level. Patient decided to do it through [employer]. We will check his labs including iron studies in 6 months. Obviously he had recurrent episodes of DVTs and pulmonary embolism. Patient would be a candidate for lifelong anticoagulation despite negative hypercoagulability work-up due to the recurrent episodes of thrombosis. He will be continued on Eliquis. Tolerated well. Patient is having persistent mild thrombocytopenia with no contraindications for continued anticoagulation. Mild thrombocytopenia is probably related to liver and spleen. Possibly caused by hemochromatosis. Labs will be monitored. We will make further recommendations based on the results. Patient's questions were answered to the best of his satisfaction and he verbalized full understanding and agreement.

## 2021-02-15 ENCOUNTER — OFFICE VISIT (OUTPATIENT)
Dept: FAMILY MEDICINE CLINIC | Age: 66
End: 2021-02-15
Payer: COMMERCIAL

## 2021-02-15 VITALS
SYSTOLIC BLOOD PRESSURE: 126 MMHG | DIASTOLIC BLOOD PRESSURE: 74 MMHG | TEMPERATURE: 96.8 F | HEART RATE: 62 BPM | OXYGEN SATURATION: 100 % | BODY MASS INDEX: 33.34 KG/M2 | WEIGHT: 245.8 LBS

## 2021-02-15 DIAGNOSIS — E66.09 CLASS 1 OBESITY DUE TO EXCESS CALORIES WITH SERIOUS COMORBIDITY AND BODY MASS INDEX (BMI) OF 33.0 TO 33.9 IN ADULT: ICD-10-CM

## 2021-02-15 DIAGNOSIS — E11.9 CONTROLLED TYPE 2 DIABETES MELLITUS WITHOUT COMPLICATION, WITHOUT LONG-TERM CURRENT USE OF INSULIN (HCC): Primary | ICD-10-CM

## 2021-02-15 DIAGNOSIS — E78.2 MIXED HYPERLIPIDEMIA: ICD-10-CM

## 2021-02-15 DIAGNOSIS — D69.6 THROMBOCYTOPENIA (HCC): ICD-10-CM

## 2021-02-15 DIAGNOSIS — I10 BENIGN ESSENTIAL HYPERTENSION: ICD-10-CM

## 2021-02-15 DIAGNOSIS — Z86.718 HISTORY OF RECURRENT DEEP VEIN THROMBOSIS (DVT): ICD-10-CM

## 2021-02-15 DIAGNOSIS — E83.110 HEREDITARY HEMOCHROMATOSIS (HCC): ICD-10-CM

## 2021-02-15 LAB — HBA1C MFR BLD: 5.9 %

## 2021-02-15 PROCEDURE — 83036 HEMOGLOBIN GLYCOSYLATED A1C: CPT | Performed by: NURSE PRACTITIONER

## 2021-02-15 PROCEDURE — 99214 OFFICE O/P EST MOD 30 MIN: CPT | Performed by: NURSE PRACTITIONER

## 2021-02-15 SDOH — ECONOMIC STABILITY: TRANSPORTATION INSECURITY
IN THE PAST 12 MONTHS, HAS THE LACK OF TRANSPORTATION KEPT YOU FROM MEDICAL APPOINTMENTS OR FROM GETTING MEDICATIONS?: NO

## 2021-02-15 SDOH — ECONOMIC STABILITY: TRANSPORTATION INSECURITY
IN THE PAST 12 MONTHS, HAS LACK OF TRANSPORTATION KEPT YOU FROM MEETINGS, WORK, OR FROM GETTING THINGS NEEDED FOR DAILY LIVING?: NO

## 2021-02-15 ASSESSMENT — ENCOUNTER SYMPTOMS
NAUSEA: 0
WHEEZING: 0
COUGH: 0
SHORTNESS OF BREATH: 0
CHEST TIGHTNESS: 0
GASTROINTESTINAL NEGATIVE: 1
EYES NEGATIVE: 1
RESPIRATORY NEGATIVE: 1
VOMITING: 0
ALLERGIC/IMMUNOLOGIC NEGATIVE: 1
DIARRHEA: 0
COLOR CHANGE: 0

## 2021-02-15 ASSESSMENT — PATIENT HEALTH QUESTIONNAIRE - PHQ9
1. LITTLE INTEREST OR PLEASURE IN DOING THINGS: 0
SUM OF ALL RESPONSES TO PHQ QUESTIONS 1-9: 0
SUM OF ALL RESPONSES TO PHQ QUESTIONS 1-9: 0

## 2021-02-15 NOTE — PROGRESS NOTES
[facility]  [address] [facility]  Dept: [phone]    Joel Booth is a 60 y.o. male who presents today for his medical conditions/complaintsas noted below. Joel Booth is here today c/o Diabetes    Past Medical History:   Diagnosis Date    Deep vein thrombosis (HCC)     x 2    Diabetes type 2, controlled ([address]     Burns filter in place     Hyperlipidemia     Hypertension     Obesity     Unspecified sleep apnea       Past Surgical History:   Procedure Laterality Date    COLONOSCOPY  14    KNEE ARTHROSCOPY Left     TONSILLECTOMY      VENA CAVA FILTER PLACEMENT      Burns Filter       Family History   Problem Relation Age of Onset    Diabetes Mother     Cancer Mother         Blood disorder     Cancer Father 79        Prostate cancer       Social History     Tobacco Use    Smoking status: Former Smoker     Packs/day: 2.50     Years: 20.00     Pack years: 50.00     Types: Cigarettes     Quit date: 2007     Years since quittin.0    Smokeless tobacco: Never Used   Substance Use Topics    Alcohol use:  Yes     Alcohol/week: 3.3 - 4.2 standard drinks     Types: 4 - 5 Standard drinks or equivalent per week     Comment: occasional / weekends       Current Outpatient Medications   Medication Sig Dispense Refill    apixaban (ELIQUIS) 5 MG TABS tablet Take 1 tablet by mouth 2 times daily 180 tablet 3    lisinopril-hydroCHLOROthiazide (PRINZIDE;ZESTORETIC) 20-12.5 MG per tablet Take 1 tablet by mouth daily 90 tablet 3    amLODIPine (NORVASC) 10 MG tablet Take 1 tablet by mouth daily 90 tablet 3    metFORMIN (GLUCOPHAGE) 500 MG tablet Take 1 tablet by mouth daily 90 tablet 3    pravastatin (PRAVACHOL) 40 MG tablet Take 1 tablet by mouth daily 90 tablet 3    tamsulosin (FLOMAX) 0.4 MG capsule Take 1 capsule by mouth daily 90 capsule 3    glucose monitoring kit (FREESTYLE) monitoring kit 1 kit per insurance 1 kit 0  Lancets MISC 1 each by Does not apply route daily 100 each 11    Lancet Device MISC 1 each by Does not apply route daily May use any brand. Use q day 1 each 0    blood glucose test strips (ASCENSIA AUTODISC VI;ONE TOUCH ULTRA TEST VI) strip Use with associated glucose meter. Use q day 100 strip 11     No current facility-administered medications for this visit. No Known Allergies      HPI:     HPI    Presents today c/o DM2 / HTN / HLD follow-up    Follows w/ [facility] ([place])  Orthopedics Dr. Mariusz Saldana (knee arthritis)  Hematology Dr. Aditi Garcia h/o multiple DVT / thrombocytopenia  DX hemochromatosis q3mo blood draws      DM2 - Metformin   Checks blood sugars occasionally , run's 100's on average on average  Hypoglycemia none  Declines medication side effects   Declines diabetic neuropathy or other complications   Does get eyes checked routinely   Weight is slowly decreasing   Does not follow diabetic diet      H/o DVT x 2    , sits for extended periods of times  [employer], has [employer] filter     H/o HTN - amlodipine, lisinopril/hctz   Doesn't monitor blood pressure at home      H/o HLD - pravastatin      H/o BPH - Flomax     Health Maintenance:      Subjective:     Review of Systems   Constitutional: Negative. Negative for appetite change, chills, diaphoresis, fatigue, fever and unexpected weight change. HENT: Negative. Eyes: Negative. Respiratory: Negative. Negative for cough, chest tightness, shortness of breath and wheezing. Cardiovascular: Negative. Negative for chest pain and leg swelling. Gastrointestinal: Negative. Negative for diarrhea, nausea and vomiting. Endocrine: Negative. Genitourinary: Negative. Negative for difficulty urinating. Musculoskeletal: Negative. Skin: Negative. Negative for color change, pallor, rash and wound. Allergic/Immunologic: Negative. Neurological: Negative. Negative for seizures, syncope, facial asymmetry and weakness. Hematological: Negative. Psychiatric/Behavioral: Negative. Negative for sleep disturbance. Objective:     Vitals:    02/15/21 0706   BP: 126/74   Pulse: 62   Temp: 96.8 °F (36 °C)   SpO2: 100%       Body mass index is 33.34 kg/m². Physical Exam  Constitutional:       General: He is not in acute distress. Appearance: Normal appearance. He is well-developed. He is obese. He is not ill-appearing, toxic-appearing or diaphoretic. HENT:      Head: Normocephalic and atraumatic. Right Ear: External ear normal.      Left Ear: External ear normal.      Nose: Nose normal.   Eyes:      General: No scleral icterus. Right eye: No discharge. Left eye: No discharge. Conjunctiva/sclera: Conjunctivae normal.      Pupils: Pupils are equal, round, and reactive to light. Neck:      Musculoskeletal: Normal range of motion and neck supple. Trachea: No tracheal deviation. Cardiovascular:      Rate and Rhythm: Normal rate and regular rhythm. Heart sounds: Normal heart sounds. No murmur. No friction rub. No gallop. Pulmonary:      Effort: Pulmonary effort is normal. No tachypnea, accessory muscle usage or respiratory distress. Breath sounds: Normal breath sounds. No stridor. No decreased breath sounds, wheezing, rhonchi or rales. Abdominal:      Palpations: Abdomen is soft. Musculoskeletal: Normal range of motion. General: No tenderness or deformity. Skin:     General: Skin is warm and dry. Coloration: Skin is not pale. Findings: No erythema or rash. Neurological:      Mental Status: He is alert and oriented to person, place, and time. GCS: GCS eye subscore is 4. GCS verbal subscore is 5. GCS motor subscore is 6. Gait: Gait is intact.    Psychiatric:         Speech: Speech normal.         Behavior: Behavior normal. Thought Content: Thought content normal.         Judgment: Judgment normal.           Assessment:         1. Controlled type 2 diabetes mellitus without complication, without long-term current use of insulin ([facility]    2. Benign essential hypertension    3. Mixed hyperlipidemia    4. History of recurrent deep vein thrombosis (DVT)    5. Class 1 obesity due to excess calories with serious comorbidity and body mass index (BMI) of 33.0 to 33.9 in adult    6. Hereditary hemochromatosis ([facility]    7. Thrombocytopenia ([facility]        Plan:     1. Controlled type 2 diabetes mellitus without complication, without long-term current use of insulin ([facility])    - POCT glycosylated hemoglobin (Hb A1C)  - CBC Auto Differential; Future  - Comprehensive Metabolic Panel; Future  - Hemoglobin A1C; Future  - TSH with Reflex; Future  - Microalbumin, Ur; Future    A1C at goal  Discussed d/c metformin , pt declined, continue med  F/u 4 months or sooner PRN     Instructed goal A1C 6.5-7 %   Advised medication benefits and side effects   Advised dietary recommendations including avoidance of carbs and concentrated sweets  Importance of daily physical activity and weight loss in disease management  Discussed importance of statin and ASCVD benefits  Advised yearly diabetic eye examinations  Advised routine monitoring of feet  Importance to notify if hypoglycemia sx and directions  Importance of regular meals with insulin/medications   Adverse effects of uncontrolled diabetes including heart disease, CVA, kidney disease, neuropathy, retinopathy, PVD etc.  Importance of routine follow-up every 3 months is key    2. Benign essential hypertension    - CBC Auto Differential; Future  - Comprehensive Metabolic Panel; Future  - TSH with Reflex; Future    BP controlled, continue meds  Update labs when due    3. Mixed hyperlipidemia    - Lipid Panel; Future    Continue statin, update labs    4.  History of recurrent deep vein thrombosis (DVT)    On Eliquis Follows w/ Hematology     5. Class 1 obesity due to excess calories with serious comorbidity and body mass index (BMI) of 33.0 to 33.9 in adult    - CBC Auto Differential; Future  - Comprehensive Metabolic Panel; Future  - Hemoglobin A1C; Future  - Lipid Panel; Future    Encouraged weight loss / dietary modifications     6. Hereditary hemochromatosis ([facility]    Follows w/ Hematology     7. Thrombocytopenia (HCC)    - CBC Auto Differential; Future      Discussed use, benefit, and side effects of prescribed medications. All patient questions answered. Pt voiced understanding. Reviewed health maintenance. Instructed to continue current medications, diet and exercise. Patient agreedwith treatment plan. Follow up as directed.      Electronically signed by ADRI Abdi CNP on 2/15/2021

## 2021-04-01 ENCOUNTER — TELEPHONE (OUTPATIENT)
Dept: ONCOLOGY | Age: 66
End: 2021-04-01

## 2021-04-01 ENCOUNTER — HOSPITAL ENCOUNTER (OUTPATIENT)
Age: 66
Discharge: HOME | End: 2021-04-01
Payer: COMMERCIAL

## 2021-04-01 DIAGNOSIS — D69.6 THROMBOCYTOPENIA (HCC): ICD-10-CM

## 2021-04-01 DIAGNOSIS — E83.110 HEREDITARY HEMOCHROMATOSIS (HCC): ICD-10-CM

## 2021-04-01 LAB
ABSOLUTE EOS #: 0.1 K/UL (ref 0–0.4)
ABSOLUTE IMMATURE GRANULOCYTE: ABNORMAL K/UL (ref 0–0.3)
ABSOLUTE LYMPH #: 2.2 K/UL (ref 1–4.8)
ABSOLUTE MONO #: 0.4 K/UL (ref 0.1–1.2)
BASOPHILS # BLD: 1 % (ref 0–2)
BASOPHILS ABSOLUTE: 0 K/UL (ref 0–0.2)
DIFFERENTIAL TYPE: ABNORMAL
EOSINOPHILS RELATIVE PERCENT: 3 % (ref 1–4)
FERRITIN: 438 UG/L (ref 30–400)
HCT VFR BLD CALC: 41.5 % (ref 41–53)
HEMOGLOBIN: 13.6 G/DL (ref 13.5–17.5)
IMMATURE GRANULOCYTES: ABNORMAL %
LYMPHOCYTES # BLD: 45 % (ref 24–44)
MCH RBC QN AUTO: 29.6 PG (ref 26–34)
MCHC RBC AUTO-ENTMCNC: 32.7 G/DL (ref 31–37)
MCV RBC AUTO: 90.4 FL (ref 80–100)
MONOCYTES # BLD: 9 % (ref 2–11)
NRBC AUTOMATED: ABNORMAL PER 100 WBC
PDW BLD-RTO: 13.1 % (ref 12.5–15.4)
PLATELET # BLD: 136 K/UL (ref 140–450)
PLATELET ESTIMATE: ABNORMAL
PMV BLD AUTO: 10.2 FL (ref 6–12)
RBC # BLD: 4.59 M/UL (ref 4.5–5.9)
RBC # BLD: ABNORMAL 10*6/UL
SEG NEUTROPHILS: 42 % (ref 36–66)
SEGMENTED NEUTROPHILS ABSOLUTE COUNT: 2 K/UL (ref 1.8–7.7)
WBC # BLD: 4.8 K/UL (ref 3.5–11)
WBC # BLD: ABNORMAL 10*3/UL

## 2021-04-01 PROCEDURE — 82728 ASSAY OF FERRITIN: CPT

## 2021-04-01 PROCEDURE — 36415 COLL VENOUS BLD VENIPUNCTURE: CPT

## 2021-04-01 PROCEDURE — 85025 COMPLETE CBC W/AUTO DIFF WBC: CPT

## 2021-04-01 RX ORDER — 0.9 % SODIUM CHLORIDE 0.9 %
500 INTRAVENOUS SOLUTION INTRAVENOUS ONCE
Status: CANCELLED | OUTPATIENT
Start: 2021-04-05 | End: 2021-04-05

## 2021-04-01 RX ORDER — 0.9 % SODIUM CHLORIDE 0.9 %
250 INTRAVENOUS SOLUTION INTRAVENOUS ONCE
Status: CANCELLED | OUTPATIENT
Start: 2021-04-05 | End: 2021-04-05

## 2021-04-01 NOTE — TELEPHONE ENCOUNTER
Call received from Wilma Kelly, on HIPAA, stating patient goes to [facility] for lab draw and phlebotomy q 3 months. Wilma Kelly stated that [facility] wants patient to hold Eliquis X10 days prior to labs/phlebotomy and patient does not want to hold Eliquis for that long. Wilma Kelly requesting for patient to get scheduled for labs/phlebotomy q 3 months here at our facility. Writer spoke with Dr Sheryl Olguin and he stated to place standing order for cbc and ferritin q 3 months and stated to remove 500ml of blood with ferritin >50 and Hgb >13. Nohemi Chatterjee, pharmacist, to place orders for above. Wilma Kelly informed of parameters and Dorian Watkins, , to call patient at [phone] to schedule. Patient is due.  Lionel Orozco

## 2021-04-05 ENCOUNTER — HOSPITAL ENCOUNTER (OUTPATIENT)
Dept: INFUSION THERAPY | Age: 66
Discharge: HOME | End: 2021-04-05
Payer: COMMERCIAL

## 2021-04-05 VITALS
BODY MASS INDEX: 33.04 KG/M2 | HEART RATE: 79 BPM | SYSTOLIC BLOOD PRESSURE: 131 MMHG | WEIGHT: 243.6 LBS | DIASTOLIC BLOOD PRESSURE: 71 MMHG

## 2021-04-05 DIAGNOSIS — E83.110 HEREDITARY HEMOCHROMATOSIS (HCC): Primary | ICD-10-CM

## 2021-04-05 PROCEDURE — 99195 PHLEBOTOMY: CPT | Performed by: NURSE PRACTITIONER

## 2021-04-05 RX ORDER — 0.9 % SODIUM CHLORIDE 0.9 %
500 INTRAVENOUS SOLUTION INTRAVENOUS ONCE
Status: CANCELLED | OUTPATIENT
Start: 2021-04-05 | End: 2021-04-05

## 2021-04-05 RX ORDER — 0.9 % SODIUM CHLORIDE 0.9 %
250 INTRAVENOUS SOLUTION INTRAVENOUS ONCE
Status: CANCELLED | OUTPATIENT
Start: 2021-04-05 | End: 2021-04-05

## 2021-04-05 NOTE — PROGRESS NOTES
Patient had therapeutic phlebotomy. 500mls of blood removed. Patient tolerated procedure well. Stable and ambulatory at d/c. Vital signs as documented.

## 2021-05-19 ENCOUNTER — HOSPITAL ENCOUNTER (OUTPATIENT)
Age: 66
Discharge: HOME | End: 2021-05-19
Payer: COMMERCIAL

## 2021-05-19 DIAGNOSIS — E83.110 HEREDITARY HEMOCHROMATOSIS (HCC): ICD-10-CM

## 2021-05-19 DIAGNOSIS — D69.6 THROMBOCYTOPENIA (HCC): ICD-10-CM

## 2021-05-19 LAB
ABSOLUTE EOS #: 0.1 K/UL (ref 0–0.4)
ABSOLUTE IMMATURE GRANULOCYTE: ABNORMAL K/UL (ref 0–0.3)
ABSOLUTE LYMPH #: 1.92 K/UL (ref 1–4.8)
ABSOLUTE MONO #: 0.29 K/UL (ref 0.1–1.3)
BASOPHILS # BLD: 0 % (ref 0–2)
BASOPHILS ABSOLUTE: 0 K/UL (ref 0–0.2)
DIFFERENTIAL TYPE: ABNORMAL
EOSINOPHILS RELATIVE PERCENT: 2 % (ref 0–4)
FERRITIN: 388 UG/L (ref 30–400)
HCT VFR BLD CALC: 40.6 % (ref 41–53)
HEMOGLOBIN: 13.3 G/DL (ref 13.5–17.5)
IMMATURE GRANULOCYTES: ABNORMAL %
LYMPHOCYTES # BLD: 40 % (ref 24–44)
MCH RBC QN AUTO: 30.2 PG (ref 26–34)
MCHC RBC AUTO-ENTMCNC: 32.8 G/DL (ref 31–37)
MCV RBC AUTO: 91.9 FL (ref 80–100)
MONOCYTES # BLD: 6 % (ref 1–7)
MORPHOLOGY: NORMAL
NRBC AUTOMATED: ABNORMAL PER 100 WBC
PDW BLD-RTO: 13.5 % (ref 11.5–14.9)
PLATELET # BLD: 122 K/UL (ref 150–450)
PLATELET ESTIMATE: ABNORMAL
PMV BLD AUTO: 10.6 FL (ref 6–12)
RBC # BLD: 4.42 M/UL (ref 4.5–5.9)
RBC # BLD: ABNORMAL 10*6/UL
SEG NEUTROPHILS: 52 % (ref 36–66)
SEGMENTED NEUTROPHILS ABSOLUTE COUNT: 2.49 K/UL (ref 1.3–9.1)
WBC # BLD: 4.8 K/UL (ref 3.5–11)
WBC # BLD: ABNORMAL 10*3/UL

## 2021-05-19 PROCEDURE — 36415 COLL VENOUS BLD VENIPUNCTURE: CPT

## 2021-05-19 PROCEDURE — 82728 ASSAY OF FERRITIN: CPT

## 2021-05-19 PROCEDURE — 85025 COMPLETE CBC W/AUTO DIFF WBC: CPT

## 2021-05-24 ENCOUNTER — TELEPHONE (OUTPATIENT)
Dept: ONCOLOGY | Age: 66
End: 2021-05-24

## 2021-05-24 ENCOUNTER — OFFICE VISIT (OUTPATIENT)
Dept: ONCOLOGY | Age: 66
End: 2021-05-24
Payer: COMMERCIAL

## 2021-05-24 VITALS
HEART RATE: 78 BPM | SYSTOLIC BLOOD PRESSURE: 114 MMHG | TEMPERATURE: 98.3 F | WEIGHT: 241.7 LBS | BODY MASS INDEX: 32.78 KG/M2 | DIASTOLIC BLOOD PRESSURE: 71 MMHG | RESPIRATION RATE: 16 BRPM

## 2021-05-24 DIAGNOSIS — E83.110 HEREDITARY HEMOCHROMATOSIS (HCC): Primary | ICD-10-CM

## 2021-05-24 PROCEDURE — 99214 OFFICE O/P EST MOD 30 MIN: CPT | Performed by: INTERNAL MEDICINE

## 2021-05-24 PROCEDURE — 99211 OFF/OP EST MAY X REQ PHY/QHP: CPT | Performed by: INTERNAL MEDICINE

## 2021-05-24 NOTE — PATIENT INSTRUCTIONS
RV 6 months with CBC, ferritin before RV  Therapeutic phlebotomy q 3 months starting July for Hb above 12 and ferritin above 50

## 2021-05-24 NOTE — TELEPHONE ENCOUNTER
AVS from 5/24/21         RV 6 months with CBC, ferritin before RV  Therapeutic phlebotomy q 3 months starting July for Hb above 12 and ferritin above 50      *Therapeutic phlebotomy is scheduled for 7/12/21@ 9am  *rv scheduled for [email or web address]@[employer]  *pt will labs(cbc,ferritin) 1 week prior @[facility] 1 week before rv    PT was given AVS and an appt schedule

## 2021-06-06 NOTE — PROGRESS NOTES
_        Chief Complaint   Patient presents with    Follow-up     review status of disease    Results    Fatigue     DIAGNOSIS:        Recurrent bilateral DVTs and pulmonary embolism  Negative hypercoagulability work-up  Lifelong anticoagulation candidate  Hereditary hemochromatosis  Persistent mild thrombocytopenia     CURRENT THERAPY:         Therapeutic phlebotomy as needed for hemochromatosis    BRIEF CASE HISTORY:      Mr. Ancelmo Rosario is a very pleasant 60 y.o. male with history of multiple co morbidities as listed. The patient is referred for evaluation for the management of thrombocytopenia. Patient has history of bilateral DVTs and pulmonary embolism for about 8 years. Initially he was maintained on Coumadin and switched to Eliquis over the last 2 years. He never had any thrombosis events while on treatment. He had hypercoagulability work-up done in 2014 which was negative. Patient had a Angelina filter inserted and was maintained on anticoagulation. Patient was noted to have mild thrombocytopenia. Patient has easy bruising secondary to anticoagulation. He denies any active bleeding. No nosebleed or gum bleed. No melena or hematochezia. No hematemesis. No fever. Patient quit smoking about 8 years ago. Denies alcohol drinking. .   INTERIM HISTORY:   Patient seen for follow-up after lab work-up and liver ultrasound. Clinically stable with no complaints. No abdominal pain. No chest pain or shortness of breath. No headaches. No weakness or fatigue. No TIA or CVA-like symptoms. No other complaints. PAST MEDICAL HISTORY: has a past medical history of Deep vein thrombosis ([address] Diabetes type 2, controlled ([address] [place] filter in place, Hyperlipidemia, Hypertension, Obesity, and Unspecified sleep apnea.     PAST SURGICAL HISTORY: has a past surgical history that includes Tonsillectomy; Knee arthroscopy (Left); Colonoscopy (8/26/14); and Vena Cava Filter Placement. CURRENT MEDICATIONS:  has a current medication list which includes the following prescription(s): apixaban, lisinopril-hydrochlorothiazide, amlodipine, metformin, tamsulosin, glucose monitoring kit, lancets, lancet device, blood glucose test strips, and pravastatin. ALLERGIES:  has No Known Allergies. FAMILY HISTORY: Family history of hemochromatosis with mother and sister. Otherwise negative for any hematological or oncological conditions. SOCIAL HISTORY:  reports that he quit smoking about 14 years ago. His smoking use included cigarettes. He has a 50.00 pack-year smoking history. He has never used smokeless tobacco. He reports current alcohol use of about 3.3 - 4.2 standard drinks of alcohol per week. He reports that he does not use drugs. REVIEW OF SYSTEMS:     · General: No weakness or fatigue. No unanticipated weight loss or decreased appetite. No fever or chills. · Eyes: No blurred vision, eye pain or double vision. · Ears: No hearing problems or drainage. No tinnitus. · Throat: No sore throat, problems with swallowing or dysphagia. · Respiratory: No cough, sputum or hemoptysis. No shortness of breath. No pleuritic chest pain. · Cardiovascular: No chest pain, orthopnea or PND. No lower extremity edema. No palpitation. · Gastrointestinal: No problems with swallowing. No abdominal pain or bloating. No nausea or vomiting. No diarrhea or constipation. No GI bleeding. · Genitourinary: No dysuria, hematuria, frequency or urgency. · Musculoskeletal: No muscle aches or pains. No limitation of movement. No back pain. No gait disturbance, No joint complaints. · Dermatologic: No skin rashes or pruritus. No skin lesions or discolorations. · Psychiatric: No depression, anxiety, or stress or signs of schizophrenia. No change in mood or affect. · Hematologic: No history of bleeding tendency.  No bruises or 06/01/2020 0000    CO2 29 06/01/2020 0000    BUN 27 06/01/2020 0000    CREATININE 1.08 06/01/2020 0000        Component Value Date/Time    CALCIUM 9.1 06/01/2020 0000    ALKPHOS 63 11/16/2020 0809    AST 21 11/16/2020 0809    ALT 15 11/16/2020 0809    BILITOT 0.24 (L) 11/16/2020 0809            IMPRESSION:   Recurrent bilateral DVTs and pulmonary embolism  Negative hypercoagulability work-up  Lifelong anticoagulation candidate  Hereditary hemochromatosis  Persistent mild thrombocytopenia      PLAN: Labs were reviewed and explained to the patient. Genetic testing for hemochromatosis was positive. I explained to the patient the nature of these abnormalities and management plans. Patient's ferritin level is 666. We recommend therapeutic phlebotomy. It can be done every 3 months while monitoring ferritin level and hemoglobin level. Patient decided to do it through [employer]. We will check his labs including iron studies in 6 months. Obviously he had recurrent episodes of DVTs and pulmonary embolism. Patient would be a candidate for lifelong anticoagulation despite negative hypercoagulability work-up due to the recurrent episodes of thrombosis. He will be continued on Eliquis. Tolerated well. Patient is having persistent mild thrombocytopenia with no contraindications for continued anticoagulation. Mild thrombocytopenia is probably related to liver and spleen. Possibly caused by hemochromatosis. Labs will be monitored. We will make further recommendations based on the results. Patient's questions were answered to the best of his satisfaction and he verbalized full understanding and agreement.

## 2021-06-08 ENCOUNTER — HOSPITAL ENCOUNTER (OUTPATIENT)
Age: 66
Discharge: HOME | End: 2021-06-08
Payer: COMMERCIAL

## 2021-06-08 DIAGNOSIS — E66.09 CLASS 1 OBESITY DUE TO EXCESS CALORIES WITH SERIOUS COMORBIDITY AND BODY MASS INDEX (BMI) OF 33.0 TO 33.9 IN ADULT: ICD-10-CM

## 2021-06-08 DIAGNOSIS — I10 BENIGN ESSENTIAL HYPERTENSION: ICD-10-CM

## 2021-06-08 DIAGNOSIS — E11.9 CONTROLLED TYPE 2 DIABETES MELLITUS WITHOUT COMPLICATION, WITHOUT LONG-TERM CURRENT USE OF INSULIN (HCC): ICD-10-CM

## 2021-06-08 DIAGNOSIS — D69.6 THROMBOCYTOPENIA (HCC): ICD-10-CM

## 2021-06-08 LAB
ABSOLUTE EOS #: 0.1 K/UL (ref 0–0.4)
ABSOLUTE IMMATURE GRANULOCYTE: ABNORMAL K/UL (ref 0–0.3)
ABSOLUTE LYMPH #: 2.1 K/UL (ref 1–4.8)
ABSOLUTE MONO #: 0.4 K/UL (ref 0.1–1.3)
ALBUMIN SERPL-MCNC: 4.1 G/DL (ref 3.5–5.2)
ALBUMIN/GLOBULIN RATIO: ABNORMAL (ref 1–2.5)
ALP BLD-CCNC: 73 U/L (ref 40–129)
ALT SERPL-CCNC: 15 U/L (ref 5–41)
ANION GAP SERPL CALCULATED.3IONS-SCNC: 9 MMOL/L (ref 9–17)
AST SERPL-CCNC: 21 U/L
BASOPHILS # BLD: 1 % (ref 0–2)
BASOPHILS ABSOLUTE: 0 K/UL (ref 0–0.2)
BILIRUB SERPL-MCNC: 0.21 MG/DL (ref 0.3–1.2)
BUN BLDV-MCNC: 23 MG/DL (ref 8–23)
BUN/CREAT BLD: ABNORMAL (ref 9–20)
CALCIUM SERPL-MCNC: 8.7 MG/DL (ref 8.6–10.4)
CHLORIDE BLD-SCNC: 102 MMOL/L (ref 98–107)
CO2: 27 MMOL/L (ref 20–31)
CREAT SERPL-MCNC: 1.09 MG/DL (ref 0.7–1.2)
CREATININE URINE: 177.1 MG/DL (ref 39–259)
DIFFERENTIAL TYPE: ABNORMAL
EOSINOPHILS RELATIVE PERCENT: 2 % (ref 0–4)
ESTIMATED AVERAGE GLUCOSE: 131 MG/DL
GFR AFRICAN AMERICAN: >60 ML/MIN
GFR NON-AFRICAN AMERICAN: >60 ML/MIN
GFR SERPL CREATININE-BSD FRML MDRD: ABNORMAL ML/MIN/{1.73_M2}
GFR SERPL CREATININE-BSD FRML MDRD: ABNORMAL ML/MIN/{1.73_M2}
GLUCOSE BLD-MCNC: 129 MG/DL (ref 70–99)
HBA1C MFR BLD: 6.2 % (ref 4–6)
HCT VFR BLD CALC: 39.8 % (ref 41–53)
HEMOGLOBIN: 13.2 G/DL (ref 13.5–17.5)
IMMATURE GRANULOCYTES: ABNORMAL %
LYMPHOCYTES # BLD: 39 % (ref 24–44)
MCH RBC QN AUTO: 30.4 PG (ref 26–34)
MCHC RBC AUTO-ENTMCNC: 33.2 G/DL (ref 31–37)
MCV RBC AUTO: 91.5 FL (ref 80–100)
MICROALBUMIN/CREAT 24H UR: <12 MG/L
MICROALBUMIN/CREAT UR-RTO: NORMAL MCG/MG CREAT
MONOCYTES # BLD: 8 % (ref 1–7)
NRBC AUTOMATED: ABNORMAL PER 100 WBC
PDW BLD-RTO: 13.2 % (ref 11.5–14.9)
PLATELET # BLD: 123 K/UL (ref 150–450)
PLATELET ESTIMATE: ABNORMAL
PMV BLD AUTO: 11.7 FL (ref 6–12)
POTASSIUM SERPL-SCNC: 4 MMOL/L (ref 3.7–5.3)
RBC # BLD: 4.35 M/UL (ref 4.5–5.9)
RBC # BLD: ABNORMAL 10*6/UL
SEG NEUTROPHILS: 50 % (ref 36–66)
SEGMENTED NEUTROPHILS ABSOLUTE COUNT: 2.6 K/UL (ref 1.3–9.1)
SODIUM BLD-SCNC: 138 MMOL/L (ref 135–144)
TOTAL PROTEIN: 7 G/DL (ref 6.4–8.3)
TSH SERPL DL<=0.05 MIU/L-ACNC: 2.24 MIU/L (ref 0.3–5)
WBC # BLD: 5.4 K/UL (ref 3.5–11)
WBC # BLD: ABNORMAL 10*3/UL

## 2021-06-08 PROCEDURE — 80053 COMPREHEN METABOLIC PANEL: CPT

## 2021-06-08 PROCEDURE — 82043 UR ALBUMIN QUANTITATIVE: CPT

## 2021-06-08 PROCEDURE — 84443 ASSAY THYROID STIM HORMONE: CPT

## 2021-06-08 PROCEDURE — 85025 COMPLETE CBC W/AUTO DIFF WBC: CPT

## 2021-06-08 PROCEDURE — 82570 ASSAY OF URINE CREATININE: CPT

## 2021-06-08 PROCEDURE — 83036 HEMOGLOBIN GLYCOSYLATED A1C: CPT

## 2021-06-08 PROCEDURE — 36415 COLL VENOUS BLD VENIPUNCTURE: CPT

## 2021-06-14 ENCOUNTER — OFFICE VISIT (OUTPATIENT)
Dept: FAMILY MEDICINE CLINIC | Age: 66
End: 2021-06-14
Payer: COMMERCIAL

## 2021-06-14 VITALS
TEMPERATURE: 97.8 F | BODY MASS INDEX: 33.61 KG/M2 | SYSTOLIC BLOOD PRESSURE: 122 MMHG | OXYGEN SATURATION: 95 % | HEART RATE: 70 BPM | WEIGHT: 247.8 LBS | DIASTOLIC BLOOD PRESSURE: 74 MMHG

## 2021-06-14 DIAGNOSIS — D69.6 THROMBOCYTOPENIA (HCC): ICD-10-CM

## 2021-06-14 DIAGNOSIS — Z86.718 HISTORY OF RECURRENT DEEP VEIN THROMBOSIS (DVT): ICD-10-CM

## 2021-06-14 DIAGNOSIS — E11.9 CONTROLLED TYPE 2 DIABETES MELLITUS WITHOUT COMPLICATION, WITHOUT LONG-TERM CURRENT USE OF INSULIN (HCC): Primary | ICD-10-CM

## 2021-06-14 DIAGNOSIS — E78.2 MIXED HYPERLIPIDEMIA: ICD-10-CM

## 2021-06-14 DIAGNOSIS — N40.1 BENIGN PROSTATIC HYPERPLASIA WITH LOWER URINARY TRACT SYMPTOMS, SYMPTOM DETAILS UNSPECIFIED: ICD-10-CM

## 2021-06-14 DIAGNOSIS — E83.110 HEREDITARY HEMOCHROMATOSIS (HCC): ICD-10-CM

## 2021-06-14 DIAGNOSIS — I10 BENIGN ESSENTIAL HYPERTENSION: ICD-10-CM

## 2021-06-14 PROCEDURE — 99214 OFFICE O/P EST MOD 30 MIN: CPT | Performed by: NURSE PRACTITIONER

## 2021-06-14 RX ORDER — ROSUVASTATIN CALCIUM 5 MG/1
5 TABLET, COATED ORAL DAILY
Qty: 90 TABLET | Refills: 2 | Status: SHIPPED | OUTPATIENT
Start: 2021-06-14 | End: 2021-09-27 | Stop reason: CLARIF

## 2021-06-14 RX ORDER — AMLODIPINE BESYLATE 10 MG/1
10 TABLET ORAL DAILY
Qty: 90 TABLET | Refills: 3 | Status: SHIPPED | OUTPATIENT
Start: 2021-06-14 | End: 2022-06-06 | Stop reason: SDUPTHER

## 2021-06-14 RX ORDER — LISINOPRIL AND HYDROCHLOROTHIAZIDE 20; 12.5 MG/1; MG/1
1 TABLET ORAL DAILY
Qty: 90 TABLET | Refills: 3 | Status: SHIPPED | OUTPATIENT
Start: 2021-06-14 | End: 2022-06-06 | Stop reason: SDUPTHER

## 2021-06-14 RX ORDER — TAMSULOSIN HYDROCHLORIDE 0.4 MG/1
0.4 CAPSULE ORAL DAILY
Qty: 90 CAPSULE | Refills: 3 | Status: SHIPPED | OUTPATIENT
Start: 2021-06-14 | End: 2022-01-31

## 2021-06-14 ASSESSMENT — PATIENT HEALTH QUESTIONNAIRE - PHQ9
SUM OF ALL RESPONSES TO PHQ9 QUESTIONS 1 & 2: 0
SUM OF ALL RESPONSES TO PHQ QUESTIONS 1-9: 0
2. FEELING DOWN, DEPRESSED OR HOPELESS: 0
SUM OF ALL RESPONSES TO PHQ QUESTIONS 1-9: 0
1. LITTLE INTEREST OR PLEASURE IN DOING THINGS: 0
SUM OF ALL RESPONSES TO PHQ QUESTIONS 1-9: 0

## 2021-06-14 ASSESSMENT — ENCOUNTER SYMPTOMS
COUGH: 0
GASTROINTESTINAL NEGATIVE: 1
CHEST TIGHTNESS: 0
SHORTNESS OF BREATH: 0
COLOR CHANGE: 0
EYES NEGATIVE: 1
RESPIRATORY NEGATIVE: 1
VOMITING: 0
NAUSEA: 0
DIARRHEA: 0
ALLERGIC/IMMUNOLOGIC NEGATIVE: 1

## 2021-06-14 NOTE — PATIENT INSTRUCTIONS
COVID-19 Vaccines: [employer] Patient Counseling Pearls  CDC/FDA/ACOG Guidance  Key Points  The Pfizer-BioNTech and Moderna mRNA vaccines are currently authorized for emergency use by the FDA in the [place] for persons ? 13 yo & ?24 yo, respectively. [facility] encourages individuals to enroll in the [employer] free smartphone tool called v-safe that uses text messaging & web surveys to provide personalized health check-ins after pts receive a COVID-19 vaccination. Are these new mRNA vaccines safe? Yes, w/ rare exceptions. Risk of severe complications from [id number] is far higher than risk of complications from the vaccines1  Many existing vaccines, incl inactivated influenza vaccines, can cause rare complications, incl Guillain-Rison syndrome (GBS), seizures, & unexplained death. Public health experts view the risks as minuscule when considering the millions of people who are safely vaccinated each year2  FDA carefully reviews all safety data from clinical trials & authorizes emergency vaccine use only when the expected benefits outweigh potential risks. 2 However, it take time, & larger numbers of people getting vaccinated, before very rare or long-term side effects come to light, as w/ any other vaccine or therapeutic  CDCs Advisory Committee on Immunization Practices (ACIP) also reviews all safety data before recommending any COVID-19 vaccine for use. FDA & CDC will continue to monitor the safety of COVID-19 vaccines & make sure even very rare side effects are identified1,2  Both mRNA COVID-19 vaccines were tested in large clinical trials and have good safety profiles2  [facility] encourages individuals to enroll in the [employer] free smartphone tool called v-safe that uses text messaging & web surveys to provide personalized health check-ins after pts receive a COVID-19 vaccination. V-safe also reminds pts to get their 2nd dose1  Footnotes  1 CDC 2021.     mRNA vaccine background  In contrast to most vaccines which use weakened or inactivated versions of the dz-causing pathogen, mRNA vaccines take advantage of the process that cells use to make proteins to trigger an immune response  The vaccines contain strands of messenger RNA inside of a special coating, which helps protect the mRNA from breakdown & helps it enter dendritic cells & macrophages in lymph nodes near the injection site  The mRNA provides instructions for the cell on how to make a piece of the SARS-CoV-2 spike protein. Since only the protein is made, it doesnt harm the person, but it is antigenic  Once the spike protein is made, the mRNA is broken down by cellular enzymes. The mRNA never enters the cells nucleus or affects genetic material. This info should help counter misinformation that mRNA vaccines alter or modify ones genetic make-up  mRNA vaccine technology has been studied for over a decade. Early-stage trials of mRNA vaccines have been carried out for influenza, Zika, rabies, & CMV, but early challenges (such as instability of free mRNA, unintended inflammatory outcomes, & modest immune responses) thwarted their continued development. Recent advances have helped to overcome these challenges for the COVID-19 vaccines  Can the COVID-19 vaccines actually lead to infxn? Neither of the mRNA vaccines contain a live virus & thus don't carry a risk of causing COVID-19 in vaccinated persons1  Footnotes  1 [facility] 2021. Side effects   Reassure pts that side effects (evy after 2nd dose) are a normal part of the bodys rxn to the vaccine & are a sign of developing immunity against COVID-19 illness1  Warn pts they may experience soreness, redness, & warmth at site of injection, in addition to fatigue, headache, & fever following vaccination  Antipyretic/analgesics can be taken to treat any post-vaccination sx.  However, at this time, CDC doesnt recommend routine prophylaxis for the prevention of such sx  Most systemic post-vaccination sx are mild to moderate, but some may affect a pts ability to do daily activities and occur w/in the first 3 days (typically resolve w/in 1-3 days of onset)  Advise pt to contact you if: injection site redness/tenderness increases after 24h, or if side effects are worrisome or not subsiding after a few days  Fever in pregnant pts: If a pregnant pt experiences fever following COVID-19 vaccination, CDC advises use of acetaminophen since fever is assoc w/ adverse pregnancy outcomes  Advise pts to return for their 2nd dose even if they develop post-vaccination sx (unless they develop a contraindication to vaccination)  Local & systemic rxn w/ Pfizer & Moderna vaccines1  local sx: approx 80%89% of vaccinated persons developed ? 1 local sx after vaccination  systemic sx: approx 55%83% of vaccinated persons developed ? 1 systemic sx following vaccination  Pfizer-BioNTCiapple COVID-19 vaccine adverse rxns:2  Injection site rxns (84.1%)  Fatigue (62.9%)  Headache (55.1%)  Myalgia (38.3%)  Chills (31.9%)  Arthralgia (23.6%)  Fever (14.2%)  Moderna COVID-19 vaccine ([id number]) adverse rxns:2  Injection site rxs (91.6%)  Fatigue (68.5%)  Headache (63%)  Myalgia (59.6%)  Arthralgia (44.8%)  Chills (43.4%)  Lymphadenopathy (axillary swelling + tenderness in vaccination arm) (21.4% in participants <64 yo, 12.4% in those ?72)  Severe adverse reactions, more frequent after the 2nd dose, occurred in:2  0.0%-4.6% of participants in Luis [facility] Huang study, more frequent in adults <54 yo  0.2%-9.7% of participants in Jaimie Hall study, more frequent in adults <64 yo    Contraindications of mRNA vaccines  Hx of severe allergic rxn (eg, anaphylaxis) after a previous dose of an mRNA COVID-19 vaccine or any of its components.  Action: Don't vaccinate  Hx of immediate allergic rxn (defined as any hypersensitivity-related s/sx [eg, urticaria, angioedema, resp distress such as wheezing, stridor] w/in 4h of administration of a vaccine or medication) of any severity to a previous dose of an mRNA vaccine or any of its components, incl polyethylene glycol (PEG). Action: Don't vaccinate unless evaluated/cleared by allergist/immunologist & vaccine can be given w/ 30 min of observation in a setting w/ advanced medical care  Hx of immediate allergic rxn of any severity to polysorbate (due to potential cross-reactivity w/ PEG). Action: Don't vaccinate unless evaluated/cleared by allergist/immunologist and vaccine can be given w/ 30 min of observation in a setting w/ advanced medical care  Note: Individuals w/ an immediate allergic rxn to the 1st dose of an mRNA vaccine shouldn't receive additional doses of either mRNA vaccine. Providers should try to determine whether rxns are immediate allergic rxns or other types of common rxns (vasovagal/post-vaccination side effects)  Note: Previous hx of COVID-19 (symptomatic or asymptomatic) isn't a contraindication to vaccination, although CDC recommends that symptomatic pts defer vaccination until they are recovered & out of isolation  Precautions for mRNA vaccines1  Hx of any immediate allergic rxn to any other vaccine/injectable tx (ie, IM, IV, or SC vaccines or therapies [excluding allergy shots]) but NOT related to mRNA vaccine components or polysorbate, as these are contraindications  Persons w/ rxn to a vaccine/injectable tx that contains multiple components, one of which PEG, another mRNA vaccine component or polysorbate, but in whom it is unknown which component elicited the immediate allergic reaction  Action:  such pts re: unknown risks of experiencing a severe allergic rxn and balance risks against vax benefits.  Perform risk assessment, consider deferral of vaccination and/or referral to allergy/immunology; observe for 30min if decision to vaccinate  Neither contraindications nor precautions to vaccination1  Delayed, local injection-site rxns (eg, erythema, induration, pruritus) after the first mRNA vaccine dose are neither a contraindication nor a pregnant women.  Adjusted risk ratio in pregnant women vs nonpregnant pts was 3.0 for ICU admission, 2.9 for mechanical ventilation, and 1.7 for death);2  birth risk also ranges from 10% to 25%, w/ rates as high as 60% among women w/ critical illness2  Based on how mRNA vaccines work, experts believe they are unlikely to pose a specific risk to pregnant pts; although mRNA vaccines havent been studied specifically in pregnant pts, mRNA platforms used in the vaccines have been in development for the last 10y; similar mRNA vaccines have been used in clinical trials targeting other infxns such as Zika, as well as several types of CA (eg, breast, melanoma)2  mRNA vaccines pose no risk to recipient of acquiring infxn from the vaccine & have potential benefits over live-attenuated virus vaccines, inactivated or subunit vaccines, & DNA-based vaccines2  Remind pts that vaccination during pregnancy is already common to prevent maternal/infant morbidities from other infectious diseases (eg, influenza, Tdap vaccine)  Routine pregnancy testing isn't required prior to receiving a COVID-19 vaccine  CDC recommends that clinicians consider the following in decision-making about vaccinating pregnant pts: likelihood of exposure to SARS-CoV-2, risk of COVID-19 to pt & potential risks to fetus; efficacy of vaccine; known side effects of vaccine, & lack of data during pregnancy  ACOG recommendations about COVID-19 vaccination during pregnancy/lactation:2  COVID-19 vaccines shouldn't be withheld from pregnant pts who meet criteria for vaccination1  COVID-19 vaccines should be offered to lactating pts when they meet ACIP-recommended priority criteria1  When discussing vaccination w/ the pt, important considerations incl: level of virus activity in community; potential efficacy of vaccine, risk & potential of maternal dz, incl effects on fetus & ; & safety of the vaccine for the pregnant pt & fetus2  While a conversation w/ a clinician may be helpful, it isn't a requirement2  Pregnancy testing isn't a requirement prior to receiving a COVID-19 vaccine1,2  Pregnant pts who decline the vaccine should be supported in their decision2    Vaccination considerations for individuals w/ underlying medical conditions  Pts w/ underlying medical conditions can safely receive the COVID-19 vaccine as long as there are no contraindications to vaccination such as a severe allergic rxn to any of its components, per CDC  Phase 2/3 clinical trials show similar safety and efficacy profiles in persons w/ underlying medical conditions, incl those that place them at increased risk for severe COVID-19, compared w/ persons w/o comorbidities  Pts w/ immunocompromise (eg, HIV, medications)1  Such pts might be at increased risk for severe COVID-19  These pts may receive a COVID-19 vaccine, but should be made aware of limited safety data  Immunocompromised pts should also be made aware of the potential for reduced immune response to the vaccine, & the need to continue to follow current guidance to protect themselves against COVID-19  Pts w/ autoimmune conditions1  Individuals w/ autoimmune conditions may receive mRNA COVID-19 vaccines, however they should be made aware of the lack of safety data  These individuals were eligible for enrollment in trials  Pts w/ hx of Guillain-[place] syndrome (GBS)1  Such pts may receive mRNA COVID-19 vaccines  To date, no cases of GBS have been reported following COVID-19 vaccination in trials  Individuals w/ a hx of Bell palsy1  Cases of Bell palsy were reported in participants in mRNA vaccine trials, but FDA doesnt consider these to be above the rate expected in the general population (and the agency hasn't concluded these cases were caused by vaccination)  Persons w/ a hx of Bell palsy may receive an mRNA COVID-19 vaccine  Individuals being tested for TB infxn1  Test for TB before or at the same time as mRNA COVID-19 vaccination, OR otherwise  Delay for ?4wk after completion of mRNA COVID-19 vaccination  After vaccination, current guidelines for prevention of spread of COVID-19 should be followed1  Wearing a mask  Stay ?6 ft away from others  Avoiding crowds  Washing hands w/ soap & water for 20 sec or using hand  w/ ?60% alcohol  Follow CDC travel guidance  Follow quarantine guidance after exposure to [id number]-85  Follow any applicable school or workplace guidance    Interim Clinical Considerations for Use of mRNA COVID-19 Vaccines Currently Authorized in the [place]. Updated 2/10/21. Accessed 2/11/21    What to Expect after Getting a Covid-19 Vaccine. Updated 12/29/20. Accessed 1/7/21    Healthcare Professionals: Preparing for COVID-19 Vaccination. Accessed 1/7/21    mandie Robert al. [facility] COVID-19 Vaccines Work Group. Phased Allocation of COVID-19 Vaccines. [facility] Meeting. December 20, 2020. Free full-text PDF    Talking to Recipients about COVID-19 Vaccines: Making a Strong Recommendation for COVID-19 Vaccination; Understanding and Explaining mRNA COVID-19 Vaccines; Answering Vaccine Recipients Questions. Updated 11/2/20. Accessed 1/7/21    Frequently Asked Questions about COVID-19 Vaccination. Updated 12/13/20. Accessed 12/17/20    What Clinicians Need to Know About the Pfizer-BioNTech COVID-19 Vaccine. Updated 12/13/20. Free full-text PDF    2 FDA 2021. FDA Statement on Following the Authorized Dosing Schedules for COVID-19 Vaccines. FDA Statement. Jan 4, 2021. Accessed 1/7/21    Vaccines and Related Biological Products Advisory Committee Meeting. Published 12/10/20. FDA Briefing Document. Pfizer-BioNTech COVID-19 Vaccine. Free full-text PDF    Vaccines and Related Biological Products Advisory Committee Meeting. Published 12/17/20. FDA Briefing Document. Moderna COVID-19 Vaccine.  Free full-text PDF    Patient Education        Learning About Statins for People With Diabetes  Introduction  Statins are medicines that help with your cholesterol. Cholesterol is a type of fat in your blood. If you have too much of this fat, it can build up in blood vessels. This raises your risk of heart disease, heart attack, and stroke. Many people with diabetes take statins. Diabetes can cause problems in your body that may also lead to heart disease. That means your risks of heart attack and stroke are higher when you have diabetes. Statins can lower your risk. Your doctor may prescribe a statin if:  · You have diabetes. · AND you are age 36 to 76. Statins may help people with diabetes at other ages too. Your doctor can help you decide if statins may help you. What are some examples of statins? Here are some examples of statins. For each item in the list, the generic name is first, followed by any brand names. · atorvastatin (Lipitor)  · lovastatin (Altoprev, Mevacor)  · pravastatin (Pravachol)  · rosuvastatin (Crestor)  · simvastatin (Zocor)  This is not a complete list of statins. Possible side effects  Some people who take statins report that they have more muscle aches. But it's not clear whether these are actually a side effect of statins. Most side effects will go away if you stop taking the medicine. You may have other side effects not described here. Check the information that comes with your medicine. What to know about taking this medicine  · You must take statins on a regular basis for them to work well. If you stop, your risk for heart attack and stroke may go back up. · Be safe with medicines. Take your medicines exactly as prescribed. Call your doctor if you think you are having a problem with your medicine. · If you have side effects that bother you, talk to your doctor. You may be able to take a different statin. · Check with your doctor or pharmacist before you use any other medicines. This includes over-the-counter medicines.  Make sure your doctor knows all of the medicines, vitamins, herbal products, and supplements you take. Taking some medicines together can cause problems. Where can you learn more? Go to [email or web address] org and sign in to your [email or web address] account. Enter Z709 in the [facility] box to learn more about \"Learning About Statins for People With Diabetes. \"     If you do not have an account, please click on the \"Sign Up Now\" link. Current as of: August 31, 2020               Content Version: 12.8  © 2006-2021 [employer]. Care instructions adapted under license by [facility] ([facility]). If you have questions about a medical condition or this instruction, always ask your healthcare professional. [address] any warranty or liability for your use of this information.

## 2021-06-14 NOTE — PROGRESS NOTES
[facility]  [address] [facility]  Dept: [phone]    Jacob Delgado is a 60 y.o. male who presents today for his medical conditions/complaintsas noted below. Jacob Delgado is here today c/o Diabetes, Hypertension, and Discuss Labs    Past Medical History:   Diagnosis Date    Deep vein thrombosis (HCC)     x 2    Diabetes type 2, controlled ([address]     [place] filter in place     Hyperlipidemia     Hypertension     Obesity     Unspecified sleep apnea       Past Surgical History:   Procedure Laterality Date    COLONOSCOPY  14    KNEE ARTHROSCOPY Left     TONSILLECTOMY      VENA CAVA FILTER PLACEMENT      [place] Filter       Family History   Problem Relation Age of Onset    Diabetes Mother     Cancer Mother         Blood disorder     Cancer Father 79        Prostate cancer       Social History     Tobacco Use    Smoking status: Former Smoker     Packs/day: 2.50     Years: 20.00     Pack years: 50.00     Types: Cigarettes     Quit date: 2007     Years since quittin.3    Smokeless tobacco: Never Used   Substance Use Topics    Alcohol use:  Yes     Alcohol/week: 3.3 - 4.2 standard drinks     Types: 4 - 5 Standard drinks or equivalent per week     Comment: occasional / weekends       Current Outpatient Medications   Medication Sig Dispense Refill    apixaban (ELIQUIS) 5 MG TABS tablet Take 1 tablet by mouth 2 times daily 180 tablet 3    lisinopril-hydroCHLOROthiazide (PRINZIDE;ZESTORETIC) 20-12.5 MG per tablet Take 1 tablet by mouth daily 90 tablet 3    amLODIPine (NORVASC) 10 MG tablet Take 1 tablet by mouth daily 90 tablet 3    metFORMIN (GLUCOPHAGE) 500 MG tablet Take 1 tablet by mouth daily 90 tablet 3    tamsulosin (FLOMAX) 0.4 MG capsule Take 1 capsule by mouth daily 90 capsule 3    glucose monitoring kit (FREESTYLE) monitoring kit 1 kit per insurance 1 kit 0    Lancets MISC 1 each by Does not apply route daily 100 each 11  Lancet Device MISC 1 each by Does not apply route daily May use any brand. Use q day 1 each 0    blood glucose test strips (ASCENSIA AUTODISC VI;ONE TOUCH ULTRA TEST VI) strip Use with associated glucose meter. Use q day 100 strip 11     No current facility-administered medications for this visit. No Known Allergies      HPI:     HPI    Presents today c/o DM2 / HTN / HLD follow-up     DM2 - Metformin   Declines medication side effects   Checks blood sugars occasionally , run's 100's on average on average  A1C 6.2%   Hypoglycemia none  Declines diabetic neuropathy or other complications   Does get eyes checked routinely   Weight is increased by 6 lbs  Does not follow diabetic diet      H/o DVT x 2    , sits for extended periods of times  Aff[employer], has judy filter    Hematology Dr. Ramiro Paulino h/o multiple DVT / thrombocytopenia  DX hemochromatosis q3mo blood draws      H/o HTN - amlodipine, lisinopril/hctz   Doesn't monitor blood pressure at home      H/o HLD - pravastatin - stopped medication secondary to myalgias      H/o BPH - Flomax     Follows w/ [facility] ([place])  Orthopedics Dr. Janice Fischer (knee arthritis)    Health Maintenance:      Subjective:     Review of Systems   Constitutional: Negative. Negative for appetite change, chills, diaphoresis, fatigue, fever and unexpected weight change. HENT: Negative. Eyes: Negative. Respiratory: Negative. Negative for cough, chest tightness and shortness of breath. Cardiovascular: Negative. Negative for chest pain and leg swelling. Gastrointestinal: Negative. Negative for diarrhea, nausea and vomiting. Endocrine: Negative. Genitourinary: Negative. Negative for difficulty urinating and dysuria. Musculoskeletal: Negative. Skin: Negative. Negative for color change, pallor, rash and wound. Allergic/Immunologic: Negative. Neurological: Negative.   Negative for dizziness, seizures, syncope, light-headedness and headaches. Hematological: Negative. Psychiatric/Behavioral: Negative. Negative for dysphoric mood. The patient is not nervous/anxious. Objective:     Vitals:    06/14/21 0705   BP: 122/74   Pulse: 70   Temp: 97.8 °F (36.6 °C)   SpO2: 95%       Body mass index is 33.61 kg/m². Physical Exam  Constitutional:       General: He is not in acute distress. Appearance: Normal appearance. He is well-developed. He is obese. He is not ill-appearing, toxic-appearing or diaphoretic. HENT:      Head: Normocephalic and atraumatic. Right Ear: External ear normal.      Left Ear: External ear normal.      Nose: Nose normal.   Eyes:      General: No scleral icterus. Right eye: No discharge. Left eye: No discharge. Conjunctiva/sclera: Conjunctivae normal.      Pupils: Pupils are equal, round, and reactive to light. Neck:      Trachea: No tracheal deviation. Cardiovascular:      Rate and Rhythm: Normal rate and regular rhythm. Pulses:           Dorsalis pedis pulses are 2+ on the right side and 2+ on the left side. Heart sounds: Normal heart sounds. No murmur heard. No friction rub. No gallop. Pulmonary:      Effort: Pulmonary effort is normal. No tachypnea, accessory muscle usage or respiratory distress. Breath sounds: Normal breath sounds. No stridor. No decreased breath sounds, wheezing, rhonchi or rales. Abdominal:      Palpations: Abdomen is soft. Musculoskeletal:         General: No tenderness or deformity. Normal range of motion. Cervical back: Normal range of motion and neck supple. Feet:      Right foot:      Skin integrity: Skin integrity normal. No ulcer, blister, skin breakdown, erythema, warmth, callus, dry skin or fissure. Left foot:      Skin integrity: Skin integrity normal. No ulcer, blister, skin breakdown, erythema, warmth, callus, dry skin or fissure.       Comments: Bilateral nail clubbing   Skin:     General: Skin is warm and dry.      Coloration: Skin is not pale. Findings: No erythema or rash. Neurological:      Mental Status: He is alert and oriented to person, place, and time. GCS: GCS eye subscore is 4. GCS verbal subscore is 5. GCS motor subscore is 6. Gait: Gait normal.   Psychiatric:         Speech: Speech normal.         Behavior: Behavior normal.         Thought Content: Thought content normal.         Judgment: Judgment normal.           Assessment:         1. Controlled type 2 diabetes mellitus without complication, without long-term current use of insulin ([facility]    2. Benign essential hypertension    3. Mixed hyperlipidemia    4. Hereditary hemochromatosis ([facility]    5. Thrombocytopenia ([facility]    6. Benign prostatic hyperplasia with lower urinary tract symptoms, symptom details unspecified    7. History of recurrent deep vein thrombosis (DVT)        Plan:     1. Controlled type 2 diabetes mellitus without complication, without long-term current use of insulin ([facility])    -  DIABETES FOOT EXAM  - metFORMIN (GLUCOPHAGE) 500 MG tablet; Take 1 tablet by mouth daily  Dispense: 90 tablet; Refill: 3  - Hemoglobin A1C; Future    A1C well controlled, continue med  F/u 3 months or sooner PRN    Instructed goal A1C 6.5-7 %   Advised medication benefits and side effects   Advised dietary recommendations including avoidance of carbs and concentrated sweets  Importance of daily physical activity and weight loss in disease management  Discussed importance of statin and ASCVD benefits  Advised yearly diabetic eye examinations  Advised routine monitoring of feet  Importance to notify if hypoglycemia sx and directions  Importance of regular meals with insulin/medications   Adverse effects of uncontrolled diabetes including heart disease, CVA, kidney disease, neuropathy, retinopathy, PVD etc.  Importance of routine follow-up every 3 months is key    2. Benign essential hypertension    - amLODIPine (NORVASC) 10 MG tablet;  Take 1

## 2021-06-18 ENCOUNTER — TELEPHONE (OUTPATIENT)
Dept: FAMILY MEDICINE CLINIC | Age: 66
End: 2021-06-18

## 2021-06-18 NOTE — TELEPHONE ENCOUNTER
Does he have enough medication for the weekend?  I would defer to Inge as to what she suggests next but I also saw that he sees Hematology - I would advise that he contact the hematology office as they may have samples they can give him

## 2021-06-18 NOTE — TELEPHONE ENCOUNTER
Patient's wife called. Eliquis is too expensive with ins - it's over $500.00  She called Eliquis to see if he can get assistance but, they told her he makes too much. She is looking for something to replace this medication.

## 2021-06-21 DIAGNOSIS — Z79.899 MEDICATION MANAGEMENT: Primary | ICD-10-CM

## 2021-06-21 NOTE — TELEPHONE ENCOUNTER
Patients wife called again stating that hemat will not always give patient samples so patient and wafe were wondering if patient could go back on Coumadin - states that patient did well on this med. Can you advise on this thank you.

## 2021-06-21 NOTE — TELEPHONE ENCOUNTER
I really try to avoid Coumadin at all costs due to the higher risk of bleeding & it being a much more difficult medication to manage (lab draws , PT/INR monitoring, etc.). I will consult Duane to see if she can determine any other viable options with his insurance & look into patient assistance for this.

## 2021-06-21 NOTE — TELEPHONE ENCOUNTER
If a change in anticoagulation has to be made however I recommend this is done by his Hematologist who manages him for his blood disorder.

## 2021-06-22 NOTE — TELEPHONE ENCOUNTER
Called patient. Spoke to wife. Gave message. She is going to call hematologist to see if there is anything he can do. Hematologist doesn't follow patient's medications for this. It is for a different blood disorder. I told her I will talk to Tea Bhardwaj to see if she has a coupon or other ideas. Wife states patient has been on coumadin in the past for years and knows how it works with blood draws and Rx dosage changes based on INR results.

## 2021-06-23 ENCOUNTER — TELEPHONE (OUTPATIENT)
Dept: FAMILY MEDICINE CLINIC | Age: 66
End: 2021-06-23

## 2021-06-23 DIAGNOSIS — Z79.899 MEDICATION MANAGEMENT: Primary | ICD-10-CM

## 2021-06-23 NOTE — TELEPHONE ENCOUNTER
Medication Management Service ([address] [place] [address])  [facility]  [phone]     CLINICAL PHARMACY NOTE:     Referral from DOUG Ortega for assistance with cost/alternative therapy for patient's Eliquis. Co-pay cost reprted as being approximately $500 per 30 day fill. APRN-CNP would prefer to look at other options before considering warfarin therapy again. Phone call to patient. Spoke with Jaqueline Pringle. Jaqueline Pringle has been looking into options for anticoagulation therapy for Yara Thomas. Patient has called [facility] ([facility]) and was told that patient makes too much for the patient assistance program.  In addition was told that there are currently no Eliquis co-pay cards available at this time. Of note, there does appear to be a co-pay card available for Xarelto for patients with commercial insurance. Advertising is for as little as $10 copay for 30 day fill. Patient currently has commercial insurance and will so until he turns 78 1/5 years. Phone call to [facility] Pharmacy does have co-pay cards for Eliquis. [facility] faxed current card to writer. Reading the eligibility it does appear that a patient can reapply after using other Eliquis co-pay cards. Patient has been using co-pay cards for the last 2 years to help with cost of Eliquis. Fax received and co-pay card mailed to patient. M message for return phone call to talk with Jaqueline Pringle about above. Phone call returned by Jaqueline Pringle. Above information shared with her. Progress note routed to PCP for review. Delmer Rae, Pharm. D., [address] Medication Management Service  [phone]  6/23/2021  5:08 PM    ======================================================  For Pharmacy Admin Tracking Only     CPA in place:  No   Intervention Detail: Patient Access Assistance/Sample Provided   Total # of Interventions Recommended: 1   Total # of Interventions Accepted: 1   Intervention

## 2021-07-12 ENCOUNTER — TELEPHONE (OUTPATIENT)
Dept: INFUSION THERAPY | Age: 66
End: 2021-07-12

## 2021-07-12 ENCOUNTER — HOSPITAL ENCOUNTER (OUTPATIENT)
Age: 66
Discharge: HOME | End: 2021-07-12
Payer: COMMERCIAL

## 2021-07-12 ENCOUNTER — HOSPITAL ENCOUNTER (OUTPATIENT)
Dept: INFUSION THERAPY | Age: 66
End: 2021-07-12
Payer: COMMERCIAL

## 2021-07-12 DIAGNOSIS — E83.110 HEREDITARY HEMOCHROMATOSIS (HCC): ICD-10-CM

## 2021-07-12 DIAGNOSIS — D69.6 THROMBOCYTOPENIA (HCC): ICD-10-CM

## 2021-07-12 LAB
ABSOLUTE EOS #: 0.1 K/UL (ref 0–0.4)
ABSOLUTE IMMATURE GRANULOCYTE: ABNORMAL K/UL (ref 0–0.3)
ABSOLUTE LYMPH #: 2.3 K/UL (ref 1–4.8)
ABSOLUTE MONO #: 0.5 K/UL (ref 0.1–1.3)
BASOPHILS # BLD: 1 % (ref 0–2)
BASOPHILS ABSOLUTE: 0 K/UL (ref 0–0.2)
DIFFERENTIAL TYPE: ABNORMAL
EOSINOPHILS RELATIVE PERCENT: 2 % (ref 0–4)
FERRITIN: 435 UG/L (ref 30–400)
HCT VFR BLD CALC: 41.1 % (ref 41–53)
HEMOGLOBIN: 13.6 G/DL (ref 13.5–17.5)
IMMATURE GRANULOCYTES: ABNORMAL %
LYMPHOCYTES # BLD: 39 % (ref 24–44)
MCH RBC QN AUTO: 30.2 PG (ref 26–34)
MCHC RBC AUTO-ENTMCNC: 33 G/DL (ref 31–37)
MCV RBC AUTO: 91.6 FL (ref 80–100)
MONOCYTES # BLD: 9 % (ref 1–7)
NRBC AUTOMATED: ABNORMAL PER 100 WBC
PDW BLD-RTO: 13.2 % (ref 11.5–14.9)
PLATELET # BLD: 128 K/UL (ref 150–450)
PLATELET ESTIMATE: ABNORMAL
PMV BLD AUTO: 11.3 FL (ref 6–12)
RBC # BLD: 4.49 M/UL (ref 4.5–5.9)
RBC # BLD: ABNORMAL 10*6/UL
SEG NEUTROPHILS: 49 % (ref 36–66)
SEGMENTED NEUTROPHILS ABSOLUTE COUNT: 2.9 K/UL (ref 1.3–9.1)
WBC # BLD: 5.8 K/UL (ref 3.5–11)
WBC # BLD: ABNORMAL 10*3/UL

## 2021-07-12 PROCEDURE — 85025 COMPLETE CBC W/AUTO DIFF WBC: CPT

## 2021-07-12 PROCEDURE — 36415 COLL VENOUS BLD VENIPUNCTURE: CPT

## 2021-07-12 PROCEDURE — 82728 ASSAY OF FERRITIN: CPT

## 2021-07-12 NOTE — TELEPHONE ENCOUNTER
Called Itzel Bloom this am as labs are not done.    He will go to [facility] lab today and I will call him back if phlebotomy needed  HGB > 12 and Ferritin > 50, phlebotomy needed  Called patient, informed him a phlebotomy is needed  He works night shift and would prefer to come first part of morning  Pt scheduled for 830am tomorrow

## 2021-07-13 ENCOUNTER — HOSPITAL ENCOUNTER (OUTPATIENT)
Dept: INFUSION THERAPY | Age: 66
Discharge: HOME | End: 2021-07-13
Payer: COMMERCIAL

## 2021-07-13 VITALS
DIASTOLIC BLOOD PRESSURE: 74 MMHG | SYSTOLIC BLOOD PRESSURE: 122 MMHG | HEART RATE: 73 BPM | TEMPERATURE: 97.4 F | RESPIRATION RATE: 16 BRPM

## 2021-07-13 DIAGNOSIS — E83.110 HEREDITARY HEMOCHROMATOSIS (HCC): Primary | ICD-10-CM

## 2021-07-13 PROCEDURE — 99195 PHLEBOTOMY: CPT

## 2021-07-13 RX ORDER — 0.9 % SODIUM CHLORIDE 0.9 %
250 INTRAVENOUS SOLUTION INTRAVENOUS ONCE
Status: CANCELLED | OUTPATIENT
Start: 2021-07-13 | End: 2021-07-13

## 2021-07-13 RX ORDER — 0.9 % SODIUM CHLORIDE 0.9 %
500 INTRAVENOUS SOLUTION INTRAVENOUS ONCE
Status: CANCELLED | OUTPATIENT
Start: 2021-07-13 | End: 2021-07-13

## 2021-07-13 NOTE — PROGRESS NOTES
Oneida Nath here for phlebotomy, labs done yesterday and reviewed with patient  Lab orders given to pt to have done 1 day prior to next phlebotomy   Vitals taken pre phlebotomy see flowsheet  Phlebotomy performed per policy, 177PE removed  Pt tolerated well  Vitals post phlebotomy taken, see flowsheet   Pt denies any complaints of dizziness or lightheadedness   Monitored x 30min   Discharged to home

## 2021-08-03 ENCOUNTER — TELEPHONE (OUTPATIENT)
Dept: FAMILY MEDICINE CLINIC | Age: 66
End: 2021-08-03

## 2021-08-03 NOTE — TELEPHONE ENCOUNTER
Medication Management Service ([address] [place] [address])  [facility]  [phone]     CLINICAL PHARMACY NOTE:      Message requesting return phone call from Berta Falls ([phone]) regarding Eliquis copay card. Call returned today. Berta Rashid reports Eliquis copay card was activated and able to be used by patient's pharmacy. Patient received a 90 day fill for $30. No additional follow up needed at this time. Will send message to patient's PCP to let her know that the copay card worked and patient was able to afford the medication. Ruben Villela, Pharm. D., [address] [facility] Medication Management Service  [phone]  8/3/2021  4:02 PM

## 2021-09-07 ENCOUNTER — HOSPITAL ENCOUNTER (OUTPATIENT)
Age: 66
Discharge: HOME | End: 2021-09-07
Payer: COMMERCIAL

## 2021-09-07 DIAGNOSIS — E83.110 HEREDITARY HEMOCHROMATOSIS (HCC): ICD-10-CM

## 2021-09-07 DIAGNOSIS — D69.6 THROMBOCYTOPENIA (HCC): ICD-10-CM

## 2021-09-07 LAB
ABSOLUTE EOS #: 0.1 K/UL (ref 0–0.4)
ABSOLUTE IMMATURE GRANULOCYTE: ABNORMAL K/UL (ref 0–0.3)
ABSOLUTE LYMPH #: 1.8 K/UL (ref 1–4.8)
ABSOLUTE MONO #: 0.4 K/UL (ref 0.1–1.3)
BASOPHILS # BLD: 1 % (ref 0–2)
BASOPHILS ABSOLUTE: 0 K/UL (ref 0–0.2)
DIFFERENTIAL TYPE: ABNORMAL
EOSINOPHILS RELATIVE PERCENT: 3 % (ref 0–4)
FERRITIN: 335 UG/L (ref 30–400)
HCT VFR BLD CALC: 41.2 % (ref 41–53)
HEMOGLOBIN: 13.5 G/DL (ref 13.5–17.5)
IMMATURE GRANULOCYTES: ABNORMAL %
LYMPHOCYTES # BLD: 37 % (ref 24–44)
MCH RBC QN AUTO: 30 PG (ref 26–34)
MCHC RBC AUTO-ENTMCNC: 32.8 G/DL (ref 31–37)
MCV RBC AUTO: 91.4 FL (ref 80–100)
MONOCYTES # BLD: 7 % (ref 1–7)
NRBC AUTOMATED: ABNORMAL PER 100 WBC
PDW BLD-RTO: 13.1 % (ref 11.5–14.9)
PLATELET # BLD: 123 K/UL (ref 150–450)
PLATELET ESTIMATE: ABNORMAL
PMV BLD AUTO: 10.6 FL (ref 6–12)
RBC # BLD: 4.51 M/UL (ref 4.5–5.9)
RBC # BLD: ABNORMAL 10*6/UL
SEG NEUTROPHILS: 52 % (ref 36–66)
SEGMENTED NEUTROPHILS ABSOLUTE COUNT: 2.6 K/UL (ref 1.3–9.1)
WBC # BLD: 4.9 K/UL (ref 3.5–11)
WBC # BLD: ABNORMAL 10*3/UL

## 2021-09-07 PROCEDURE — 85025 COMPLETE CBC W/AUTO DIFF WBC: CPT

## 2021-09-07 PROCEDURE — 82728 ASSAY OF FERRITIN: CPT

## 2021-09-07 PROCEDURE — 36415 COLL VENOUS BLD VENIPUNCTURE: CPT

## 2021-09-13 ENCOUNTER — HOSPITAL ENCOUNTER (OUTPATIENT)
Age: 66
Discharge: HOME | End: 2021-09-13
Payer: COMMERCIAL

## 2021-09-13 DIAGNOSIS — E11.9 CONTROLLED TYPE 2 DIABETES MELLITUS WITHOUT COMPLICATION, WITHOUT LONG-TERM CURRENT USE OF INSULIN (HCC): ICD-10-CM

## 2021-09-13 DIAGNOSIS — E78.2 MIXED HYPERLIPIDEMIA: ICD-10-CM

## 2021-09-13 LAB
CHOLESTEROL/HDL RATIO: 4.2
CHOLESTEROL: 202 MG/DL
ESTIMATED AVERAGE GLUCOSE: 140 MG/DL
HBA1C MFR BLD: 6.5 % (ref 4–6)
HDLC SERPL-MCNC: 48 MG/DL
LDL CHOLESTEROL: 130 MG/DL (ref 0–130)
TRIGL SERPL-MCNC: 118 MG/DL
VLDLC SERPL CALC-MCNC: ABNORMAL MG/DL (ref 1–30)

## 2021-09-13 PROCEDURE — 83036 HEMOGLOBIN GLYCOSYLATED A1C: CPT

## 2021-09-13 PROCEDURE — 36415 COLL VENOUS BLD VENIPUNCTURE: CPT

## 2021-09-13 PROCEDURE — 80061 LIPID PANEL: CPT

## 2021-09-27 ENCOUNTER — OFFICE VISIT (OUTPATIENT)
Dept: FAMILY MEDICINE CLINIC | Age: 66
End: 2021-09-27
Payer: COMMERCIAL

## 2021-09-27 VITALS
DIASTOLIC BLOOD PRESSURE: 72 MMHG | TEMPERATURE: 97.4 F | BODY MASS INDEX: 34.61 KG/M2 | HEART RATE: 73 BPM | SYSTOLIC BLOOD PRESSURE: 128 MMHG | OXYGEN SATURATION: 97 % | WEIGHT: 255.2 LBS

## 2021-09-27 DIAGNOSIS — Z71.85 IMMUNIZATION COUNSELING: ICD-10-CM

## 2021-09-27 DIAGNOSIS — M15.9 PRIMARY OSTEOARTHRITIS INVOLVING MULTIPLE JOINTS: ICD-10-CM

## 2021-09-27 DIAGNOSIS — E11.9 CONTROLLED TYPE 2 DIABETES MELLITUS WITHOUT COMPLICATION, WITHOUT LONG-TERM CURRENT USE OF INSULIN (HCC): Primary | ICD-10-CM

## 2021-09-27 DIAGNOSIS — I10 BENIGN ESSENTIAL HYPERTENSION: ICD-10-CM

## 2021-09-27 DIAGNOSIS — E83.110 HEREDITARY HEMOCHROMATOSIS (HCC): ICD-10-CM

## 2021-09-27 DIAGNOSIS — E78.2 MIXED HYPERLIPIDEMIA: ICD-10-CM

## 2021-09-27 PROCEDURE — 99214 OFFICE O/P EST MOD 30 MIN: CPT | Performed by: NURSE PRACTITIONER

## 2021-09-27 RX ORDER — PRAVASTATIN SODIUM 40 MG
40 TABLET ORAL DAILY
Qty: 90 TABLET | Refills: 3 | COMMUNITY
Start: 2021-09-27 | End: 2021-11-15 | Stop reason: ALTCHOICE

## 2021-09-27 ASSESSMENT — PATIENT HEALTH QUESTIONNAIRE - PHQ9
SUM OF ALL RESPONSES TO PHQ QUESTIONS 1-9: 0
SUM OF ALL RESPONSES TO PHQ9 QUESTIONS 1 & 2: 0
SUM OF ALL RESPONSES TO PHQ QUESTIONS 1-9: 0
1. LITTLE INTEREST OR PLEASURE IN DOING THINGS: 0
2. FEELING DOWN, DEPRESSED OR HOPELESS: 0
SUM OF ALL RESPONSES TO PHQ QUESTIONS 1-9: 0

## 2021-09-27 ASSESSMENT — ENCOUNTER SYMPTOMS
COUGH: 0
NAUSEA: 0
CHEST TIGHTNESS: 0
BACK PAIN: 1
DIARRHEA: 0
COLOR CHANGE: 0
GASTROINTESTINAL NEGATIVE: 1
EYES NEGATIVE: 1
ALLERGIC/IMMUNOLOGIC NEGATIVE: 1
VOMITING: 0
RESPIRATORY NEGATIVE: 1
SHORTNESS OF BREATH: 0

## 2021-09-27 NOTE — PROGRESS NOTES
[facility]  [address] [facility]  Dept: [phone]    Amelia Garcia is a 73 y.o. male who presents today for his medical conditions/complaintsas noted below. Amelia Garcia is here today c/o Diabetes and Discuss Labs    Past Medical History:   Diagnosis Date    Deep vein thrombosis (HCC)     x 2    Diabetes type 2, controlled ([address]     Angelina filter in place     Hyperlipidemia     Hypertension     Obesity     Unspecified sleep apnea       Past Surgical History:   Procedure Laterality Date    COLONOSCOPY  14    KNEE ARTHROSCOPY Left     TONSILLECTOMY      VENA CAVA FILTER PLACEMENT      Angelina Filter       Family History   Problem Relation Age of Onset    Diabetes Mother     Cancer Mother         Blood disorder     Cancer Father 79        Prostate cancer       Social History     Tobacco Use    Smoking status: Former Smoker     Packs/day: 2.50     Years: 20.00     Pack years: 50.00     Types: Cigarettes     Quit date: 2007     Years since quittin.6    Smokeless tobacco: Never Used   Substance Use Topics    Alcohol use: Yes     Alcohol/week: 3.3 - 4.2 standard drinks     Types: 4 - 5 Standard drinks or equivalent per week     Comment: occasional / weekends       Current Outpatient Medications   Medication Sig Dispense Refill    rosuvastatin (CRESTOR) 5 MG tablet Take 1 tablet by mouth daily 90 tablet 2    tamsulosin (FLOMAX) 0.4 MG capsule Take 1 capsule by mouth daily 90 capsule 3    metFORMIN (GLUCOPHAGE) 500 MG tablet Take 1 tablet by mouth daily 90 tablet 3    amLODIPine (NORVASC) 10 MG tablet Take 1 tablet by mouth daily 90 tablet 3    lisinopril-hydroCHLOROthiazide (PRINZIDE;ZESTORETIC) 20-12.5 MG per tablet Take 1 tablet by mouth daily 90 tablet 3    apixaban (ELIQUIS) 5 MG TABS tablet Take 1 tablet by mouth 2 times daily 180 tablet 3     No current facility-administered medications for this visit.      No Known Allergies      HPI:     HPI    Presents today c/o DM2 / HTN / HLD follow-up     DM2 - Metformin   Declines medication side effects   Not monitoring blood sugar at home   A1C 6.5%   Hypoglycemia none  Declines diabetic neuropathy or other complications   Eye examination is due   Weight is slowly increasing   Does not follow diabetic diet      H/o DVT x 2    , sits for extended periods of times  Affiliated [employer], has [place] filter  Hematology Dr. Holden h/o multiple DVT / thrombocytopenia  DX hemochromatosis q3mo blood draws      H/o HTN - amlodipine, lisinopril/hctz   Doesn't monitor blood pressure at home      H/o HLD - pravastatin  (doesn't think he's taking Crestor)      H/o BPH - Flomax (no longer taking)   Admits to some difficulty urinating, hasn't seen Urologist in a while     Wondering about medication for intermittent back / hip pain flair's after being physically active   Cannot take NSAID's due to Eliquis   Tylenol doesn't work   Has received small amounts of Norco in the past that he takes PRN      Follows w/ [address]  [facility] ([facility]) - compliant with CPAP machine     Orthopedics Dr. Ford (knee arthritis)    Health Maintenance:      Subjective:     Review of Systems   Constitutional: Negative. Negative for appetite change, chills, diaphoresis and fever. HENT: Negative. Eyes: Negative. Respiratory: Negative. Negative for cough, chest tightness and shortness of breath. Cardiovascular: Negative. Gastrointestinal: Negative. Negative for diarrhea, nausea and vomiting. Endocrine: Negative. Genitourinary: Positive for difficulty urinating. Musculoskeletal: Positive for arthralgias and back pain. Skin: Negative. Negative for color change, pallor, rash and wound. Allergic/Immunologic: Negative. Neurological: Negative. Negative for seizures, syncope, facial asymmetry, speech difficulty, weakness and numbness. Hematological: Negative.     Psychiatric/Behavioral: encouraged  Flu vaccine encouraged     Discussed use, benefit, and side effects of prescribed medications. All patient questions answered. Pt voiced understanding. Reviewed health maintenance. Instructed to continue current medications, diet and exercise. Patient agreedwith treatment plan. Follow up as directed.      Electronically signed by ADRI Mullins CNP on 9/27/2021

## 2021-10-01 ENCOUNTER — HOSPITAL ENCOUNTER (OUTPATIENT)
Age: 66
Discharge: HOME | End: 2021-10-01
Payer: COMMERCIAL

## 2021-10-01 DIAGNOSIS — D69.6 THROMBOCYTOPENIA (HCC): ICD-10-CM

## 2021-10-01 DIAGNOSIS — E83.110 HEREDITARY HEMOCHROMATOSIS (HCC): ICD-10-CM

## 2021-10-01 LAB
ABSOLUTE EOS #: 0.2 K/UL (ref 0–0.4)
ABSOLUTE IMMATURE GRANULOCYTE: ABNORMAL K/UL (ref 0–0.3)
ABSOLUTE LYMPH #: 2 K/UL (ref 1–4.8)
ABSOLUTE MONO #: 0.5 K/UL (ref 0.1–1.3)
BASOPHILS # BLD: 1 % (ref 0–2)
BASOPHILS ABSOLUTE: 0 K/UL (ref 0–0.2)
DIFFERENTIAL TYPE: ABNORMAL
EOSINOPHILS RELATIVE PERCENT: 3 % (ref 0–4)
FERRITIN: 436 UG/L (ref 30–400)
HCT VFR BLD CALC: 41.7 % (ref 41–53)
HEMOGLOBIN: 13.8 G/DL (ref 13.5–17.5)
IMMATURE GRANULOCYTES: ABNORMAL %
LYMPHOCYTES # BLD: 36 % (ref 24–44)
MCH RBC QN AUTO: 30.3 PG (ref 26–34)
MCHC RBC AUTO-ENTMCNC: 33.2 G/DL (ref 31–37)
MCV RBC AUTO: 91.2 FL (ref 80–100)
MONOCYTES # BLD: 9 % (ref 1–7)
NRBC AUTOMATED: ABNORMAL PER 100 WBC
PDW BLD-RTO: 13.2 % (ref 11.5–14.9)
PLATELET # BLD: 125 K/UL (ref 150–450)
PLATELET ESTIMATE: ABNORMAL
PMV BLD AUTO: 10.5 FL (ref 6–12)
RBC # BLD: 4.57 M/UL (ref 4.5–5.9)
RBC # BLD: ABNORMAL 10*6/UL
SEG NEUTROPHILS: 51 % (ref 36–66)
SEGMENTED NEUTROPHILS ABSOLUTE COUNT: 2.8 K/UL (ref 1.3–9.1)
WBC # BLD: 5.5 K/UL (ref 3.5–11)
WBC # BLD: ABNORMAL 10*3/UL

## 2021-10-01 PROCEDURE — 82728 ASSAY OF FERRITIN: CPT

## 2021-10-01 PROCEDURE — 85025 COMPLETE CBC W/AUTO DIFF WBC: CPT

## 2021-10-01 PROCEDURE — 36415 COLL VENOUS BLD VENIPUNCTURE: CPT

## 2021-10-04 ENCOUNTER — HOSPITAL ENCOUNTER (OUTPATIENT)
Dept: INFUSION THERAPY | Age: 66
Discharge: HOME | End: 2021-10-04
Payer: COMMERCIAL

## 2021-10-04 VITALS
SYSTOLIC BLOOD PRESSURE: 107 MMHG | WEIGHT: 252.2 LBS | TEMPERATURE: 97.9 F | HEART RATE: 82 BPM | DIASTOLIC BLOOD PRESSURE: 70 MMHG | BODY MASS INDEX: 34.2 KG/M2 | RESPIRATION RATE: 16 BRPM

## 2021-10-04 DIAGNOSIS — E83.110 HEREDITARY HEMOCHROMATOSIS (HCC): Primary | ICD-10-CM

## 2021-10-04 PROCEDURE — 99195 PHLEBOTOMY: CPT | Performed by: NURSE PRACTITIONER

## 2021-10-04 RX ORDER — 0.9 % SODIUM CHLORIDE 0.9 %
250 INTRAVENOUS SOLUTION INTRAVENOUS ONCE
Status: CANCELLED | OUTPATIENT
Start: 2021-10-04 | End: 2021-10-04

## 2021-10-04 RX ORDER — 0.9 % SODIUM CHLORIDE 0.9 %
500 INTRAVENOUS SOLUTION INTRAVENOUS ONCE
Status: CANCELLED | OUTPATIENT
Start: 2021-10-04 | End: 2021-10-04

## 2021-10-04 NOTE — PROGRESS NOTES
Patient had therapeutic phlebotomy. 500mls mls of blood removed. Patient tolerated procedure well. Stable and ambulatory at d/c. Vital signs as documented.

## 2021-11-15 ENCOUNTER — OFFICE VISIT (OUTPATIENT)
Dept: ONCOLOGY | Age: 66
End: 2021-11-15
Payer: COMMERCIAL

## 2021-11-15 ENCOUNTER — TELEPHONE (OUTPATIENT)
Dept: ONCOLOGY | Age: 66
End: 2021-11-15

## 2021-11-15 VITALS
TEMPERATURE: 97.8 F | DIASTOLIC BLOOD PRESSURE: 73 MMHG | SYSTOLIC BLOOD PRESSURE: 123 MMHG | WEIGHT: 259.4 LBS | BODY MASS INDEX: 35.18 KG/M2 | HEART RATE: 69 BPM

## 2021-11-15 DIAGNOSIS — E83.110 HEREDITARY HEMOCHROMATOSIS (HCC): Primary | ICD-10-CM

## 2021-11-15 DIAGNOSIS — D69.6 THROMBOCYTOPENIA (HCC): ICD-10-CM

## 2021-11-15 PROCEDURE — 99211 OFF/OP EST MAY X REQ PHY/QHP: CPT | Performed by: INTERNAL MEDICINE

## 2021-11-15 PROCEDURE — 99214 OFFICE O/P EST MOD 30 MIN: CPT | Performed by: INTERNAL MEDICINE

## 2021-11-15 RX ORDER — ROSUVASTATIN CALCIUM 5 MG/1
5 TABLET, COATED ORAL DAILY
COMMUNITY
Start: 2021-09-12 | End: 2021-12-17

## 2021-11-15 NOTE — PROGRESS NOTES
_        Chief Complaint   Patient presents with    Follow-up     review status of disease    Discuss Labs     DIAGNOSIS:        Recurrent bilateral DVTs and pulmonary embolism  Negative hypercoagulability work-up  Lifelong anticoagulation candidate  Hereditary hemochromatosis  Persistent mild thrombocytopenia     CURRENT THERAPY:         Therapeutic phlebotomy as needed for hemochromatosis    BRIEF CASE HISTORY:      Mr. Magdaleno Ledesma is a very pleasant 73 y.o. male with history of multiple co morbidities as listed. The patient is referred for evaluation for the management of thrombocytopenia. Patient has history of bilateral DVTs and pulmonary embolism for about 8 years. Initially he was maintained on Coumadin and switched to Eliquis over the last 2 years. He never had any thrombosis events while on treatment. He had hypercoagulability work-up done in 2014 which was negative. Patient had a [place] filter inserted and was maintained on anticoagulation. Patient was noted to have mild thrombocytopenia. Patient has easy bruising secondary to anticoagulation. He denies any active bleeding. No nosebleed or gum bleed. No melena or hematochezia. No hematemesis. No fever. Patient quit smoking about 8 years ago. Denies alcohol drinking. .   INTERIM HISTORY:   Patient seen for follow-up after lab work-up for hemochromatosis. He had thrombocytopenia secondary to liver disease. Platelets are stable. No active bleeding. No bruising. Patient feels tired. Otherwise no other side effects. No headaches or dizziness. No TIA or CVA-like symptoms. He is getting therapeutic phlebotomy every 3 months. No complications.       PAST MEDICAL HISTORY: has a past medical history of Deep vein thrombosis ([address] Diabetes type 2, controlled ([address] [place] filter in place, Hyperlipidemia, Hypertension, Obesity, and Unspecified sleep apnea. PAST SURGICAL HISTORY: has a past surgical history that includes Tonsillectomy; Knee arthroscopy (Left); Colonoscopy (8/26/14); and Vena Cava Filter Placement. CURRENT MEDICATIONS:  has a current medication list which includes the following prescription(s): rosuvastatin, diclofenac sodium, tamsulosin, metformin, amlodipine, lisinopril-hydrochlorothiazide, and apixaban. ALLERGIES:  has No Known Allergies. FAMILY HISTORY: Family history of hemochromatosis with mother and sister. Otherwise negative for any hematological or oncological conditions. SOCIAL HISTORY:  reports that he quit smoking about 14 years ago. His smoking use included cigarettes. He has a 50.00 pack-year smoking history. He has never used smokeless tobacco. He reports current alcohol use of about 3.3 - 4.2 standard drinks of alcohol per week. He reports that he does not use drugs. REVIEW OF SYSTEMS:     · General: Positive for weakness and fatigue. . No unanticipated weight loss or decreased appetite. No fever or chills. · Eyes: No blurred vision, eye pain or double vision. · Ears: No hearing problems or drainage. No tinnitus. · Throat: No sore throat, problems with swallowing or dysphagia. · Respiratory: No cough, sputum or hemoptysis. No shortness of breath. No pleuritic chest pain. · Cardiovascular: No chest pain, orthopnea or PND. No lower extremity edema. No palpitation. · Gastrointestinal: No problems with swallowing. No abdominal pain or bloating. No nausea or vomiting. No diarrhea or constipation. No GI bleeding. · Genitourinary: No dysuria, hematuria, frequency or urgency. · Musculoskeletal: No muscle aches or pains. No limitation of movement. No back pain. No gait disturbance, No joint complaints. · Dermatologic: No skin rashes or pruritus. No skin lesions or discolorations. · Psychiatric: No depression, anxiety, or stress or signs of schizophrenia. No change in mood or affect. · Hematologic: No history of bleeding tendency. No bruises or ecchymosis. No history of clotting problems. · Infectious disease: No fever, chills or frequent infections. · Endocrine: No polydipsia or polyuria. No temperature intolerance. · Neurologic: No headaches or dizziness. No weakness or numbness of the extremities. No changes in balance, coordination,  memory, mentation, behavior. · Allergic/Immunologic: No nasal congestion or hives. No repeated infections. PHYSICAL EXAM:  The patient is not in acute distress. Vital signs: Blood pressure 123/73, pulse 69, temperature 97.8 °F (36.6 °C), temperature source Temporal, weight 259 lb 6.4 oz (117.7 kg).      General appearance - well appearing, not in pain or distress  Mental status - good mood, alert and oriented  Eyes - pupils equal and reactive, extraocular eye movements intact  Ears - bilateral TM's and external ear canals normal  Nose - normal and patent, no erythema, discharge or polyps  Mouth - mucous membranes moist, pharynx normal without lesions  Neck - supple, no significant adenopathy  Lymphatics - no palpable lymphadenopathy, no hepatosplenomegaly  Chest - clear to auscultation, no wheezes, rales or rhonchi, symmetric air entry  Heart - normal rate, regular rhythm, normal S1, S2, no murmurs, rubs, clicks or gallops  Abdomen - soft, nontender, nondistended, no masses or organomegaly  Neurological - alert, oriented, normal speech, no focal findings or movement disorder noted  Musculoskeletal - no joint tenderness, deformity or swelling  Extremities - peripheral pulses normal, no pedal edema, no clubbing or cyanosis  Skin - normal coloration and turgor, no rashes, no suspicious skin lesions noted     Review of Diagnostic data:   Lab Results   Component Value Date    WBC 5.5 10/01/2021    HGB 13.8 10/01/2021    HCT 41.7 10/01/2021    MCV 91.2 10/01/2021     (L) 10/01/2021       Chemistry        Component Value Date/Time     06/08/2021 0649    K 4.0 06/08/2021 0649     06/08/2021 0649    CO2 27 06/08/2021 0649    BUN 23 06/08/2021 0649    CREATININE 1.09 06/08/2021 0649        Component Value Date/Time    CALCIUM 8.7 06/08/2021 0649    ALKPHOS 73 06/08/2021 0649    AST 21 06/08/2021 0649    ALT 15 06/08/2021 0649    BILITOT 0.21 (L) 06/08/2021 0649            IMPRESSION:   Recurrent bilateral DVTs and pulmonary embolism  Negative hypercoagulability work-up  Lifelong anticoagulation candidate  Hereditary hemochromatosis  Persistent mild thrombocytopenia      PLAN: Labs were reviewed and explained to the patient. Genetic testing for hemochromatosis was positive. I explained to the patient the nature of these abnormalities and management plans. Patient's ferritin level is still elevated. Platelets are improving. Last CBC showed platelet counts of 365. Discussed further management. We will make therapeutic phlebotomy every 2 months. We will continue to monitor CBC and ferritin level. We will check his labs including iron studies in 6 months. Obviously he had recurrent episodes of DVTs and pulmonary embolism. Patient would be a candidate for lifelong anticoagulation despite negative hypercoagulability work-up due to the recurrent episodes of thrombosis. He will be continued on Eliquis. Tolerated well. Patient is having persistent mild thrombocytopenia with no contraindications for continued anticoagulation. Mild thrombocytopenia is probably related to liver and spleen. Possibly caused by hemochromatosis. Labs will be monitored. We will make further recommendations based on the results. Patient's questions were answered to the best of his satisfaction and he verbalized full understanding and agreement.

## 2021-11-15 NOTE — PATIENT INSTRUCTIONS
RV 6 months with CBC, ferritin before RV  Therapeutic phlebotomy q 2 months starting July for Hb above 12 and ferritin above 50

## 2021-11-15 NOTE — TELEPHONE ENCOUNTER
AVS from 11/15/21    RV 6 months with CBC, ferritin before RV  Therapeutic phlebotomy q 2 months starting July for Hb above 12 and ferritin above 50    RV scheduled 5/16/22 @ 2pm    Phleb changed to every 2 months    PT was given AVS and an appt schedule    Electronically signed by Terry Pat on 11/15/2021 at 3:13 PM

## 2021-12-03 ENCOUNTER — TELEPHONE (OUTPATIENT)
Dept: INFUSION THERAPY | Age: 66
End: 2021-12-03

## 2021-12-03 NOTE — TELEPHONE ENCOUNTER
Left a message for Levar Griffith and instructed him to have his labs done prior to Monday. He is due for phlebotomy and needs a ferritin and H and H drawn.

## 2021-12-04 ENCOUNTER — HOSPITAL ENCOUNTER (OUTPATIENT)
Age: 66
Discharge: HOME | End: 2021-12-04
Payer: COMMERCIAL

## 2021-12-04 DIAGNOSIS — D69.6 THROMBOCYTOPENIA (HCC): ICD-10-CM

## 2021-12-04 DIAGNOSIS — E83.110 HEREDITARY HEMOCHROMATOSIS (HCC): ICD-10-CM

## 2021-12-04 LAB
ABSOLUTE EOS #: 0.2 K/UL (ref 0–0.4)
ABSOLUTE IMMATURE GRANULOCYTE: ABNORMAL K/UL (ref 0–0.3)
ABSOLUTE LYMPH #: 2 K/UL (ref 1–4.8)
ABSOLUTE MONO #: 0.6 K/UL (ref 0.1–1.3)
BASOPHILS # BLD: 1 % (ref 0–2)
BASOPHILS ABSOLUTE: 0.1 K/UL (ref 0–0.2)
DIFFERENTIAL TYPE: ABNORMAL
EOSINOPHILS RELATIVE PERCENT: 4 % (ref 0–4)
FERRITIN: 220 UG/L (ref 30–400)
HCT VFR BLD CALC: 40.5 % (ref 41–53)
HEMOGLOBIN: 13.7 G/DL (ref 13.5–17.5)
IMMATURE GRANULOCYTES: ABNORMAL %
LYMPHOCYTES # BLD: 37 % (ref 24–44)
MCH RBC QN AUTO: 30.4 PG (ref 26–34)
MCHC RBC AUTO-ENTMCNC: 33.8 G/DL (ref 31–37)
MCV RBC AUTO: 90.1 FL (ref 80–100)
MONOCYTES # BLD: 10 % (ref 1–7)
NRBC AUTOMATED: ABNORMAL PER 100 WBC
PDW BLD-RTO: 12.9 % (ref 11.5–14.9)
PLATELET # BLD: 129 K/UL (ref 150–450)
PLATELET ESTIMATE: ABNORMAL
PMV BLD AUTO: 10.7 FL (ref 6–12)
RBC # BLD: 4.49 M/UL (ref 4.5–5.9)
RBC # BLD: ABNORMAL 10*6/UL
SEG NEUTROPHILS: 48 % (ref 36–66)
SEGMENTED NEUTROPHILS ABSOLUTE COUNT: 2.7 K/UL (ref 1.3–9.1)
WBC # BLD: 5.5 K/UL (ref 3.5–11)
WBC # BLD: ABNORMAL 10*3/UL

## 2021-12-04 PROCEDURE — 85025 COMPLETE CBC W/AUTO DIFF WBC: CPT

## 2021-12-04 PROCEDURE — 36415 COLL VENOUS BLD VENIPUNCTURE: CPT

## 2021-12-04 PROCEDURE — 82728 ASSAY OF FERRITIN: CPT

## 2021-12-06 ENCOUNTER — HOSPITAL ENCOUNTER (OUTPATIENT)
Dept: INFUSION THERAPY | Age: 66
Discharge: HOME | End: 2021-12-06

## 2021-12-06 VITALS
RESPIRATION RATE: 16 BRPM | DIASTOLIC BLOOD PRESSURE: 71 MMHG | HEART RATE: 77 BPM | SYSTOLIC BLOOD PRESSURE: 111 MMHG | TEMPERATURE: 98.1 F

## 2021-12-06 DIAGNOSIS — E83.110 HEREDITARY HEMOCHROMATOSIS (HCC): Primary | ICD-10-CM

## 2021-12-06 RX ORDER — 0.9 % SODIUM CHLORIDE 0.9 %
250 INTRAVENOUS SOLUTION INTRAVENOUS ONCE
Status: CANCELLED | OUTPATIENT
Start: 2021-12-06 | End: 2021-12-06

## 2021-12-06 RX ORDER — 0.9 % SODIUM CHLORIDE 0.9 %
500 INTRAVENOUS SOLUTION INTRAVENOUS ONCE
Status: CANCELLED | OUTPATIENT
Start: 2021-12-06 | End: 2021-12-06

## 2021-12-06 NOTE — PROGRESS NOTES
Patient here for phlebotomy. Denies any issues at this time. Vitals stable. 525 ml blood removed. He tolerated procedure well and was discharged home in stable condition. He is due to return 2/7 for next treatment.

## 2021-12-17 RX ORDER — ROSUVASTATIN CALCIUM 5 MG/1
TABLET, COATED ORAL
Qty: 90 TABLET | Refills: 0 | Status: SHIPPED | OUTPATIENT
Start: 2021-12-17 | End: 2022-06-06 | Stop reason: SDUPTHER

## 2021-12-17 NOTE — TELEPHONE ENCOUNTER
Eric Penaloza is calling to request a refill on the following medication(s):    Medication Request:  Requested Prescriptions     Pending Prescriptions Disp Refills    rosuvastatin (CRESTOR) 5 MG tablet [Pharmacy Med Name: ROSUVASTATIN 5MG TABLETS] 90 tablet 0     Sig: TAKE 1 TABLET BY MOUTH DAILY       Last Visit Date (If Applicable):  2/96/9772    Next Visit Date:    1/31/2022

## 2022-01-31 ENCOUNTER — OFFICE VISIT (OUTPATIENT)
Dept: FAMILY MEDICINE CLINIC | Age: 67
End: 2022-01-31
Payer: COMMERCIAL

## 2022-01-31 VITALS
HEART RATE: 67 BPM | OXYGEN SATURATION: 97 % | BODY MASS INDEX: 35.26 KG/M2 | TEMPERATURE: 97.1 F | SYSTOLIC BLOOD PRESSURE: 120 MMHG | WEIGHT: 260 LBS | DIASTOLIC BLOOD PRESSURE: 62 MMHG

## 2022-01-31 DIAGNOSIS — M25.551 BILATERAL HIP PAIN: ICD-10-CM

## 2022-01-31 DIAGNOSIS — Z23 IMMUNIZATION DUE: ICD-10-CM

## 2022-01-31 DIAGNOSIS — E78.2 MIXED HYPERLIPIDEMIA: ICD-10-CM

## 2022-01-31 DIAGNOSIS — I10 BENIGN ESSENTIAL HYPERTENSION: ICD-10-CM

## 2022-01-31 DIAGNOSIS — E11.9 CONTROLLED TYPE 2 DIABETES MELLITUS WITHOUT COMPLICATION, WITHOUT LONG-TERM CURRENT USE OF INSULIN (HCC): Primary | ICD-10-CM

## 2022-01-31 DIAGNOSIS — M25.552 BILATERAL HIP PAIN: ICD-10-CM

## 2022-01-31 LAB — HBA1C MFR BLD: 6.1 %

## 2022-01-31 PROCEDURE — 99214 OFFICE O/P EST MOD 30 MIN: CPT | Performed by: NURSE PRACTITIONER

## 2022-01-31 PROCEDURE — 90732 PPSV23 VACC 2 YRS+ SUBQ/IM: CPT | Performed by: NURSE PRACTITIONER

## 2022-01-31 PROCEDURE — 90471 IMMUNIZATION ADMIN: CPT | Performed by: NURSE PRACTITIONER

## 2022-01-31 PROCEDURE — 83036 HEMOGLOBIN GLYCOSYLATED A1C: CPT | Performed by: NURSE PRACTITIONER

## 2022-01-31 RX ORDER — TRAMADOL HYDROCHLORIDE 50 MG/1
50 TABLET ORAL EVERY 8 HOURS PRN
Qty: 10 TABLET | Refills: 0 | Status: SHIPPED | OUTPATIENT
Start: 2022-01-31 | End: 2022-02-07

## 2022-01-31 ASSESSMENT — ENCOUNTER SYMPTOMS
ALLERGIC/IMMUNOLOGIC NEGATIVE: 1
COLOR CHANGE: 0
VOMITING: 0
CHEST TIGHTNESS: 0
EYES NEGATIVE: 1
COUGH: 0
GASTROINTESTINAL NEGATIVE: 1
RESPIRATORY NEGATIVE: 1
SHORTNESS OF BREATH: 0
DIARRHEA: 0
NAUSEA: 0

## 2022-01-31 ASSESSMENT — PATIENT HEALTH QUESTIONNAIRE - PHQ9
2. FEELING DOWN, DEPRESSED OR HOPELESS: 0
SUM OF ALL RESPONSES TO PHQ QUESTIONS 1-9: 0
1. LITTLE INTEREST OR PLEASURE IN DOING THINGS: 0
SUM OF ALL RESPONSES TO PHQ QUESTIONS 1-9: 0
SUM OF ALL RESPONSES TO PHQ9 QUESTIONS 1 & 2: 0

## 2022-01-31 NOTE — PROGRESS NOTES
[facility]  [address]  Dept: [phone]    Jimy Martinez is a 73 y.o. male who presents today for his medical conditions/complaintsas noted below. Jimy Martinez is here today c/o Diabetes, Hypertension, and Hyperlipidemia    Past Medical History:   Diagnosis Date    Deep vein thrombosis (HCC)     x 2    Diabetes type 2, controlled ([address]     [place] filter in place     Hyperlipidemia     Hypertension     Obesity     Unspecified sleep apnea       Past Surgical History:   Procedure Laterality Date    COLONOSCOPY  14    KNEE ARTHROSCOPY Left     TONSILLECTOMY      VENA CAVA FILTER PLACEMENT      [place] Filter       Family History   Problem Relation Age of Onset    Diabetes Mother     Cancer Mother         Blood disorder     Cancer Father 79        Prostate cancer       Social History     Tobacco Use    Smoking status: Former Smoker     Packs/day: 2.50     Years: 20.00     Pack years: 50.00     Types: Cigarettes     Quit date: 2007     Years since quittin.9    Smokeless tobacco: Never Used   Substance Use Topics    Alcohol use:  Yes     Alcohol/week: 3.3 - 4.2 standard drinks     Types: 4 - 5 Standard drinks or equivalent per week     Comment: occasional / weekends       Current Outpatient Medications   Medication Sig Dispense Refill    rosuvastatin (CRESTOR) 5 MG tablet TAKE 1 TABLET BY MOUTH DAILY 90 tablet 0    diclofenac sodium (VOLTAREN) 1 % GEL Apply 2-4 g topically 4 times daily as needed for Pain 150 g 1    tamsulosin (FLOMAX) 0.4 MG capsule Take 1 capsule by mouth daily 90 capsule 3    metFORMIN (GLUCOPHAGE) 500 MG tablet Take 1 tablet by mouth daily 90 tablet 3    amLODIPine (NORVASC) 10 MG tablet Take 1 tablet by mouth daily 90 tablet 3    lisinopril-hydroCHLOROthiazide (PRINZIDE;ZESTORETIC) 20-12.5 MG per tablet Take 1 tablet by mouth daily 90 tablet 3    apixaban (ELIQUIS) 5 MG TABS tablet Take 1 tablet by mouth 2 times daily 180 tablet 3     No current facility-administered medications for this visit. No Known Allergies      HPI:     HPI    Presents today c/o DM2 / HTN / HLD follow-up     DM2 - Metformin   Declines medication side effects   Not monitoring blood sugar at home   Hypoglycemia none  Declines diabetic neuropathy or other complications   Eye examination is due   Weight is slowly increasing   Does not follow diabetic diet   Eye examination not up to date, discussed      H/o HTN - amlodipine, lisinopril/hctz   Checks blood pressure occasionally at home, runs normal      H/o HLD - Crestor      H/o BPH - Flomax (no longer taking)     C/o chronic bilateral hip pain  Ongoing for years, symptoms rapidly worsening   Position changes & certain activities make symptoms worse   Sleep is disturbed secondary to pain   Was active over the weekend & could hardly get out of bed secondary to pain   Does follow w/ Orthopedics , due for knee replacement   Has tried Tylenol & topical Voltaren without relief   Cannot take NSAID's due to Eliquis   Following with chiropractor with some relief   Used to take PRN Norco with relief     H/o DVT x 2    , sits for extended periods of times  Affiliated [employer], has [place] filter  Hematology Dr. Holden h/o multiple DVT / thrombocytopenia  DX hemochromatosis q2mo blood draws      Follows w/ [facility] ([place]) - compliant with CPAP machine      Orthopedics Dr. Ford (knee arthritis)    Health Maintenance:      Subjective:     Review of Systems   Constitutional: Negative. Negative for appetite change, chills, diaphoresis and fever. HENT: Negative. Eyes: Negative. Respiratory: Negative. Negative for cough, chest tightness and shortness of breath. Cardiovascular: Negative. Negative for chest pain and leg swelling. Gastrointestinal: Negative. Negative for diarrhea, nausea and vomiting. Endocrine: Negative. Genitourinary: Negative. Negative for difficulty urinating. Musculoskeletal: Positive for arthralgias. Skin: Negative. Negative for color change, pallor, rash and wound. Allergic/Immunologic: Negative. Neurological: Negative. Negative for seizures, syncope and facial asymmetry. Hematological: Negative. Psychiatric/Behavioral: Negative. Negative for dysphoric mood. The patient is not nervous/anxious. Objective:     Vitals:    01/31/22 0751   BP: 120/62   Pulse: 67   Temp: 97.1 °F (36.2 °C)   SpO2: 97%       Body mass index is 35.26 kg/m². Physical Exam  Constitutional:       General: He is not in acute distress. Appearance: Normal appearance. He is well-developed. He is obese. He is not ill-appearing, toxic-appearing or diaphoretic. HENT:      Head: Normocephalic and atraumatic. Right Ear: External ear normal.      Left Ear: External ear normal.      Nose: Nose normal.   Eyes:      General: No scleral icterus. Right eye: No discharge. Left eye: No discharge. Conjunctiva/sclera: Conjunctivae normal.      Pupils: Pupils are equal, round, and reactive to light. Neck:      Trachea: No tracheal deviation. Cardiovascular:      Rate and Rhythm: Normal rate and regular rhythm. Heart sounds: Normal heart sounds. No murmur heard. No friction rub. No gallop. Pulmonary:      Effort: Pulmonary effort is normal. No tachypnea, accessory muscle usage or respiratory distress. Breath sounds: Normal breath sounds. No stridor. No decreased breath sounds, wheezing, rhonchi or rales. Abdominal:      Palpations: Abdomen is soft. Musculoskeletal:         General: No deformity. Cervical back: Normal range of motion and neck supple. Right hip: Tenderness present. No deformity or lacerations. Decreased range of motion. Left hip: Tenderness present. No deformity or lacerations. Decreased range of motion. Skin:     General: Skin is warm and dry.       Coloration: Skin is not pale. Findings: No erythema or rash. Neurological:      Mental Status: He is alert and oriented to person, place, and time. GCS: GCS eye subscore is 4. GCS verbal subscore is 5. GCS motor subscore is 6. Gait: Gait normal.   Psychiatric:         Speech: Speech normal.         Behavior: Behavior normal.         Thought Content: Thought content normal.         Judgment: Judgment normal.           Assessment:         1. Controlled type 2 diabetes mellitus without complication, without long-term current use of insulin ([address]    2. Benign essential hypertension    3. Mixed hyperlipidemia    4. Bilateral hip pain    5. Immunization due        Plan:     1. Controlled type 2 diabetes mellitus without complication, without long-term current use of insulin (HCC)    - POCT glycosylated hemoglobin (Hb A1C)  - Comprehensive Metabolic Panel; Future  - CBC Auto Differential; Future  - Hemoglobin A1C; Future  - Microalbumin, Ur; Future    A1C well controlled at goal, continue med  Update labs before follow-up appointment  Lifestyle modifications    Instructed goal A1C 6.5-7 %   Advised medication benefits and side effects   Advised dietary recommendations including avoidance of carbs and concentrated sweets  Importance of daily physical activity and weight loss in disease management  Discussed importance of statin and ASCVD benefits  Advised yearly diabetic eye examinations  Advised routine monitoring of feet  Importance to notify if hypoglycemia sx and directions  Importance of regular meals with insulin/medications   Adverse effects of uncontrolled diabetes including heart disease, CVA, kidney disease, neuropathy, retinopathy, PVD etc.  Importance of routine follow-up every 3 months is key    2. Benign essential hypertension    - Comprehensive Metabolic Panel; Future  - CBC Auto Differential; Future    BP well controlled, continue meds  Update labs for monitoring purposes     3.  Mixed hyperlipidemia    - Lipid Panel; Future    Continue statin, update labs when due     4. Bilateral hip pain    - traMADol (ULTRAM) 50 MG tablet; Take 1 tablet by mouth every 8 hours as needed for Pain for up to 7 days. Intended supply: 7 days. Take lowest dose possible to manage pain  Dispense: 10 tablet; Refill: 0    NSAID's contraindicated due to blood thinner  Small supply of tramadol for severe pain ONLY unmanaged by OTC   Recommended f/u with Orthopedics & PT   Patient declined new referral at this time  Side effects of tramadol discussed in depth, no driving or drinking alcohol with medication  [place]AR[place] report reviewed without red flag s/s     5. Immunization due    - Pneumococcal polysaccharide vaccine 23-valent greater than or equal to 3yo subcutaneous/IM      Discussed use, benefit, and side effects of prescribed medications. All patient questions answered. Pt voiced understanding. Reviewed health maintenance. Instructed to continue current medications, diet and exercise. Patient agreedwith treatment plan. Follow up as directed.      Electronically signed by ADRI Nova CNP on 1/31/2022

## 2022-02-08 ENCOUNTER — HOSPITAL ENCOUNTER (OUTPATIENT)
Age: 67
Discharge: HOME | End: 2022-02-08
Payer: COMMERCIAL

## 2022-02-08 DIAGNOSIS — E83.110 HEREDITARY HEMOCHROMATOSIS (HCC): ICD-10-CM

## 2022-02-08 DIAGNOSIS — D69.6 THROMBOCYTOPENIA (HCC): ICD-10-CM

## 2022-02-08 LAB
ABSOLUTE EOS #: 0.1 K/UL (ref 0–0.4)
ABSOLUTE IMMATURE GRANULOCYTE: ABNORMAL K/UL (ref 0–0.3)
ABSOLUTE LYMPH #: 2.2 K/UL (ref 1–4.8)
ABSOLUTE MONO #: 0.7 K/UL (ref 0.1–1.3)
BASOPHILS # BLD: 1 % (ref 0–2)
BASOPHILS ABSOLUTE: 0 K/UL (ref 0–0.2)
DIFFERENTIAL TYPE: ABNORMAL
EOSINOPHILS RELATIVE PERCENT: 2 % (ref 0–4)
FERRITIN: 241 UG/L (ref 30–400)
HCT VFR BLD CALC: 41.4 % (ref 41–53)
HEMOGLOBIN: 14 G/DL (ref 13.5–17.5)
IMMATURE GRANULOCYTES: ABNORMAL %
LYMPHOCYTES # BLD: 33 % (ref 24–44)
MCH RBC QN AUTO: 30.5 PG (ref 26–34)
MCHC RBC AUTO-ENTMCNC: 34 G/DL (ref 31–37)
MCV RBC AUTO: 89.7 FL (ref 80–100)
MONOCYTES # BLD: 11 % (ref 1–7)
NRBC AUTOMATED: ABNORMAL PER 100 WBC
PDW BLD-RTO: 12.6 % (ref 11.5–14.9)
PLATELET # BLD: 135 K/UL (ref 150–450)
PLATELET ESTIMATE: ABNORMAL
PMV BLD AUTO: 10.6 FL (ref 6–12)
RBC # BLD: 4.61 M/UL (ref 4.5–5.9)
RBC # BLD: ABNORMAL 10*6/UL
SEG NEUTROPHILS: 53 % (ref 36–66)
SEGMENTED NEUTROPHILS ABSOLUTE COUNT: 3.6 K/UL (ref 1.3–9.1)
WBC # BLD: 6.6 K/UL (ref 3.5–11)
WBC # BLD: ABNORMAL 10*3/UL

## 2022-02-08 PROCEDURE — 82728 ASSAY OF FERRITIN: CPT

## 2022-02-08 PROCEDURE — 36415 COLL VENOUS BLD VENIPUNCTURE: CPT

## 2022-02-08 PROCEDURE — 85025 COMPLETE CBC W/AUTO DIFF WBC: CPT

## 2022-02-10 ENCOUNTER — HOSPITAL ENCOUNTER (OUTPATIENT)
Dept: INFUSION THERAPY | Age: 67
Discharge: HOME | End: 2022-02-10
Payer: COMMERCIAL

## 2022-02-10 VITALS
DIASTOLIC BLOOD PRESSURE: 78 MMHG | TEMPERATURE: 97.7 F | HEART RATE: 69 BPM | RESPIRATION RATE: 18 BRPM | SYSTOLIC BLOOD PRESSURE: 119 MMHG

## 2022-02-10 DIAGNOSIS — E83.110 HEREDITARY HEMOCHROMATOSIS (HCC): Primary | ICD-10-CM

## 2022-02-10 PROCEDURE — 99195 PHLEBOTOMY: CPT

## 2022-02-10 RX ORDER — 0.9 % SODIUM CHLORIDE 0.9 %
500 INTRAVENOUS SOLUTION INTRAVENOUS ONCE
Status: CANCELLED | OUTPATIENT
Start: 2022-02-10 | End: 2022-02-10

## 2022-02-10 RX ORDER — 0.9 % SODIUM CHLORIDE 0.9 %
250 INTRAVENOUS SOLUTION INTRAVENOUS ONCE
Status: CANCELLED | OUTPATIENT
Start: 2022-02-10 | End: 2022-02-10

## 2022-02-10 NOTE — PROGRESS NOTES
Pt here for Phlebotomy. Denies any problems. Tolerates very well. Pt requested to leave after 20 minutes. Reinforced increasing his fluid intake today and if he should have any problems to go to the ER. Will return 4/4 for possible phlebotomy.

## 2022-04-04 ENCOUNTER — HOSPITAL ENCOUNTER (OUTPATIENT)
Dept: INFUSION THERAPY | Age: 67
End: 2022-04-04

## 2022-04-04 ENCOUNTER — HOSPITAL ENCOUNTER (OUTPATIENT)
Age: 67
Discharge: HOME | End: 2022-04-04
Payer: COMMERCIAL

## 2022-04-04 DIAGNOSIS — E83.110 HEREDITARY HEMOCHROMATOSIS (HCC): ICD-10-CM

## 2022-04-04 DIAGNOSIS — D69.6 THROMBOCYTOPENIA (HCC): ICD-10-CM

## 2022-04-04 LAB
ABSOLUTE EOS #: 0.1 K/UL (ref 0–0.4)
ABSOLUTE LYMPH #: 1.7 K/UL (ref 1–4.8)
ABSOLUTE MONO #: 0.4 K/UL (ref 0.1–1.3)
BASOPHILS # BLD: 1 % (ref 0–2)
BASOPHILS ABSOLUTE: 0 K/UL (ref 0–0.2)
EOSINOPHILS RELATIVE PERCENT: 3 % (ref 0–4)
FERRITIN: 170 NG/ML (ref 30–400)
HCT VFR BLD CALC: 40.5 % (ref 41–53)
HEMOGLOBIN: 13.7 G/DL (ref 13.5–17.5)
LYMPHOCYTES # BLD: 38 % (ref 24–44)
MCH RBC QN AUTO: 30.2 PG (ref 26–34)
MCHC RBC AUTO-ENTMCNC: 33.8 G/DL (ref 31–37)
MCV RBC AUTO: 89.4 FL (ref 80–100)
MONOCYTES # BLD: 9 % (ref 1–7)
PDW BLD-RTO: 13 % (ref 11.5–14.9)
PLATELET # BLD: 117 K/UL (ref 150–450)
PMV BLD AUTO: 10.9 FL (ref 6–12)
RBC # BLD: 4.54 M/UL (ref 4.5–5.9)
SEG NEUTROPHILS: 49 % (ref 36–66)
SEGMENTED NEUTROPHILS ABSOLUTE COUNT: 2.2 K/UL (ref 1.3–9.1)
WBC # BLD: 4.4 K/UL (ref 3.5–11)

## 2022-04-04 PROCEDURE — 36415 COLL VENOUS BLD VENIPUNCTURE: CPT

## 2022-04-04 PROCEDURE — 85025 COMPLETE CBC W/AUTO DIFF WBC: CPT

## 2022-04-04 PROCEDURE — 82728 ASSAY OF FERRITIN: CPT

## 2022-04-06 ENCOUNTER — HOSPITAL ENCOUNTER (OUTPATIENT)
Dept: INFUSION THERAPY | Age: 67
Discharge: HOME | End: 2022-04-06

## 2022-04-06 VITALS
RESPIRATION RATE: 18 BRPM | HEART RATE: 70 BPM | SYSTOLIC BLOOD PRESSURE: 123 MMHG | TEMPERATURE: 98.3 F | DIASTOLIC BLOOD PRESSURE: 76 MMHG

## 2022-04-06 DIAGNOSIS — E83.110 HEREDITARY HEMOCHROMATOSIS (HCC): Primary | ICD-10-CM

## 2022-04-06 RX ORDER — 0.9 % SODIUM CHLORIDE 0.9 %
250 INTRAVENOUS SOLUTION INTRAVENOUS ONCE
OUTPATIENT
Start: 2022-04-06 | End: 2022-04-06

## 2022-04-06 RX ORDER — 0.9 % SODIUM CHLORIDE 0.9 %
500 INTRAVENOUS SOLUTION INTRAVENOUS ONCE
OUTPATIENT
Start: 2022-04-06 | End: 2022-04-06

## 2022-04-06 NOTE — PROGRESS NOTES
Patient here for phlebotomy. Vitals stable. 490 ml blood removed. Patient tolerated treatment well and was discharged home in stable condition. He is due to return 5/16 for next phlebotomy.

## 2022-05-16 ENCOUNTER — OFFICE VISIT (OUTPATIENT)
Dept: ONCOLOGY | Age: 67
End: 2022-05-16
Payer: COMMERCIAL

## 2022-05-16 ENCOUNTER — TELEPHONE (OUTPATIENT)
Dept: ONCOLOGY | Age: 67
End: 2022-05-16

## 2022-05-16 VITALS
DIASTOLIC BLOOD PRESSURE: 76 MMHG | TEMPERATURE: 97.5 F | WEIGHT: 265.4 LBS | HEART RATE: 71 BPM | BODY MASS INDEX: 35.99 KG/M2 | SYSTOLIC BLOOD PRESSURE: 125 MMHG

## 2022-05-16 DIAGNOSIS — D69.6 THROMBOCYTOPENIA (HCC): ICD-10-CM

## 2022-05-16 DIAGNOSIS — E83.110 HEREDITARY HEMOCHROMATOSIS (HCC): Primary | ICD-10-CM

## 2022-05-16 PROCEDURE — 99214 OFFICE O/P EST MOD 30 MIN: CPT | Performed by: INTERNAL MEDICINE

## 2022-05-16 PROCEDURE — 99211 OFF/OP EST MAY X REQ PHY/QHP: CPT | Performed by: INTERNAL MEDICINE

## 2022-05-22 NOTE — PROGRESS NOTES
sleep apnea. PAST SURGICAL HISTORY: has a past surgical history that includes Tonsillectomy; Knee arthroscopy (Left); Colonoscopy (8/26/14); and Vena Cava Filter Placement. CURRENT MEDICATIONS:  has a current medication list which includes the following prescription(s): rosuvastatin, diclofenac sodium, metformin, amlodipine, lisinopril-hydrochlorothiazide, and apixaban. ALLERGIES:  has No Known Allergies. FAMILY HISTORY: Family history of hemochromatosis with mother and sister. Otherwise negative for any hematological or oncological conditions. SOCIAL HISTORY:  reports that he quit smoking about 15 years ago. His smoking use included cigarettes. He has a 50.00 pack-year smoking history. He has never used smokeless tobacco. He reports current alcohol use of about 3.3 - 4.2 standard drinks of alcohol per week. He reports that he does not use drugs. REVIEW OF SYSTEMS:     · General: Positive for weakness and fatigue. . No unanticipated weight loss or decreased appetite. No fever or chills. · Eyes: No blurred vision, eye pain or double vision. · Ears: No hearing problems or drainage. No tinnitus. · Throat: No sore throat, problems with swallowing or dysphagia. · Respiratory: No cough, sputum or hemoptysis. No shortness of breath. No pleuritic chest pain. · Cardiovascular: No chest pain, orthopnea or PND. No lower extremity edema. No palpitation. · Gastrointestinal: No problems with swallowing. No abdominal pain or bloating. No nausea or vomiting. No diarrhea or constipation. No GI bleeding. · Genitourinary: No dysuria, hematuria, frequency or urgency. · Musculoskeletal: No muscle aches or pains. No limitation of movement. No back pain. No gait disturbance, No joint complaints. · Dermatologic: No skin rashes or pruritus. No skin lesions or discolorations. · Psychiatric: No depression, anxiety, or stress or signs of schizophrenia. No change in mood or affect.     · Hematologic: No history of bleeding tendency. No bruises or ecchymosis. No history of clotting problems. · Infectious disease: No fever, chills or frequent infections. · Endocrine: No polydipsia or polyuria. No temperature intolerance. · Neurologic: No headaches or dizziness. No weakness or numbness of the extremities. No changes in balance, coordination,  memory, mentation, behavior. · Allergic/Immunologic: No nasal congestion or hives. No repeated infections. PHYSICAL EXAM:  The patient is not in acute distress. Vital signs: Blood pressure 125/76, pulse 71, temperature 97.5 °F (36.4 °C), temperature source Temporal, weight 265 lb 6.4 oz (120.4 kg).      General appearance - well appearing, not in pain or distress  Mental status - good mood, alert and oriented  Eyes - pupils equal and reactive, extraocular eye movements intact  Ears - bilateral TM's and external ear canals normal  Nose - normal and patent, no erythema, discharge or polyps  Mouth - mucous membranes moist, pharynx normal without lesions  Neck - supple, no significant adenopathy  Lymphatics - no palpable lymphadenopathy, no hepatosplenomegaly  Chest - clear to auscultation, no wheezes, rales or rhonchi, symmetric air entry  Heart - normal rate, regular rhythm, normal S1, S2, no murmurs, rubs, clicks or gallops  Abdomen - soft, nontender, nondistended, no masses or organomegaly  Neurological - alert, oriented, normal speech, no focal findings or movement disorder noted  Musculoskeletal - no joint tenderness, deformity or swelling  Extremities - peripheral pulses normal, no pedal edema, no clubbing or cyanosis  Skin - normal coloration and turgor, no rashes, no suspicious skin lesions noted     Review of Diagnostic data:   Lab Results   Component Value Date    WBC 4.4 04/04/2022    HGB 13.7 04/04/2022    HCT 40.5 (L) 04/04/2022    MCV 89.4 04/04/2022     (L) 04/04/2022       Chemistry        Component Value Date/Time     06/08/2021 0649    K 4.0 06/08/2021 0649     06/08/2021 0649    CO2 27 06/08/2021 0649    BUN 23 06/08/2021 0649    CREATININE 1.09 06/08/2021 0649        Component Value Date/Time    CALCIUM 8.7 06/08/2021 0649    ALKPHOS 73 06/08/2021 0649    AST 21 06/08/2021 0649    ALT 15 06/08/2021 0649    BILITOT 0.21 (L) 06/08/2021 0649            IMPRESSION:   Recurrent bilateral DVTs and pulmonary embolism  Negative hypercoagulability work-up  Lifelong anticoagulation candidate  Hereditary hemochromatosis  Persistent mild thrombocytopenia      PLAN: Labs were reviewed and explained to the patient. Genetic testing for hemochromatosis was positive. I explained to the patient the nature of these abnormalities and management plans. Patient's ferritin level is still elevated. Platelets are stable. Discussed further management. We will make therapeutic phlebotomy every 6 months. We will continue to monitor CBC and ferritin level. We will check his labs including iron studies in 6 months. Obviously he had recurrent episodes of DVTs and pulmonary embolism. Patient would be a candidate for lifelong anticoagulation despite negative hypercoagulability work-up due to the recurrent episodes of thrombosis. He will be continued on Eliquis. Tolerated well. Patient is having persistent mild thrombocytopenia with no contraindications for continued anticoagulation. Mild thrombocytopenia is probably related to liver and spleen. Possibly caused by hemochromatosis. Labs will be monitored. We will make further recommendations based on the results. Patient's questions were answered to the best of his satisfaction and he verbalized full understanding and agreement.

## 2022-05-31 ENCOUNTER — HOSPITAL ENCOUNTER (OUTPATIENT)
Age: 67
Discharge: HOME | End: 2022-05-31
Payer: COMMERCIAL

## 2022-05-31 DIAGNOSIS — I10 BENIGN ESSENTIAL HYPERTENSION: ICD-10-CM

## 2022-05-31 DIAGNOSIS — E78.2 MIXED HYPERLIPIDEMIA: ICD-10-CM

## 2022-05-31 DIAGNOSIS — E11.9 CONTROLLED TYPE 2 DIABETES MELLITUS WITHOUT COMPLICATION, WITHOUT LONG-TERM CURRENT USE OF INSULIN (HCC): ICD-10-CM

## 2022-05-31 LAB
ABSOLUTE EOS #: 0.2 K/UL (ref 0–0.4)
ABSOLUTE LYMPH #: 2 K/UL (ref 1–4.8)
ABSOLUTE MONO #: 0.5 K/UL (ref 0.1–1.3)
ALBUMIN SERPL-MCNC: 4 G/DL (ref 3.5–5.2)
ALP BLD-CCNC: 80 U/L (ref 40–129)
ALT SERPL-CCNC: 18 U/L (ref 5–41)
ANION GAP SERPL CALCULATED.3IONS-SCNC: 9 MMOL/L (ref 9–17)
AST SERPL-CCNC: 23 U/L
BASOPHILS # BLD: 1 % (ref 0–2)
BASOPHILS ABSOLUTE: 0 K/UL (ref 0–0.2)
BILIRUB SERPL-MCNC: 0.39 MG/DL (ref 0.3–1.2)
BUN BLDV-MCNC: 22 MG/DL (ref 8–23)
CALCIUM SERPL-MCNC: 9.3 MG/DL (ref 8.6–10.4)
CHLORIDE BLD-SCNC: 102 MMOL/L (ref 98–107)
CHOLESTEROL/HDL RATIO: 3.5
CHOLESTEROL: 163 MG/DL
CO2: 28 MMOL/L (ref 20–31)
CREAT SERPL-MCNC: 1.17 MG/DL (ref 0.7–1.2)
CREATININE URINE: 198 MG/DL (ref 39–259)
EOSINOPHILS RELATIVE PERCENT: 3 % (ref 0–4)
ESTIMATED AVERAGE GLUCOSE: 134 MG/DL
GFR AFRICAN AMERICAN: >60 ML/MIN
GFR NON-AFRICAN AMERICAN: >60 ML/MIN
GFR SERPL CREATININE-BSD FRML MDRD: ABNORMAL ML/MIN/{1.73_M2}
GLUCOSE BLD-MCNC: 140 MG/DL (ref 70–99)
HBA1C MFR BLD: 6.3 % (ref 4–6)
HCT VFR BLD CALC: 40.1 % (ref 41–53)
HDLC SERPL-MCNC: 46 MG/DL
HEMOGLOBIN: 13.5 G/DL (ref 13.5–17.5)
LDL CHOLESTEROL: 91 MG/DL (ref 0–130)
LYMPHOCYTES # BLD: 40 % (ref 24–44)
MCH RBC QN AUTO: 30.2 PG (ref 26–34)
MCHC RBC AUTO-ENTMCNC: 33.6 G/DL (ref 31–37)
MCV RBC AUTO: 89.8 FL (ref 80–100)
MICROALBUMIN/CREAT 24H UR: 15 MG/L
MICROALBUMIN/CREAT UR-RTO: 8 MCG/MG CREAT
MONOCYTES # BLD: 10 % (ref 1–7)
PDW BLD-RTO: 13.1 % (ref 11.5–14.9)
PLATELET # BLD: 150 K/UL (ref 150–450)
PMV BLD AUTO: 10.3 FL (ref 6–12)
POTASSIUM SERPL-SCNC: 4.3 MMOL/L (ref 3.7–5.3)
RBC # BLD: 4.46 M/UL (ref 4.5–5.9)
SEG NEUTROPHILS: 46 % (ref 36–66)
SEGMENTED NEUTROPHILS ABSOLUTE COUNT: 2.3 K/UL (ref 1.3–9.1)
SODIUM BLD-SCNC: 139 MMOL/L (ref 135–144)
TOTAL PROTEIN: 7.1 G/DL (ref 6.4–8.3)
TRIGL SERPL-MCNC: 130 MG/DL
WBC # BLD: 5 K/UL (ref 3.5–11)

## 2022-05-31 PROCEDURE — 80053 COMPREHEN METABOLIC PANEL: CPT

## 2022-05-31 PROCEDURE — 83036 HEMOGLOBIN GLYCOSYLATED A1C: CPT

## 2022-05-31 PROCEDURE — 36415 COLL VENOUS BLD VENIPUNCTURE: CPT

## 2022-05-31 PROCEDURE — 82043 UR ALBUMIN QUANTITATIVE: CPT

## 2022-05-31 PROCEDURE — 85025 COMPLETE CBC W/AUTO DIFF WBC: CPT

## 2022-05-31 PROCEDURE — 80061 LIPID PANEL: CPT

## 2022-05-31 PROCEDURE — 82570 ASSAY OF URINE CREATININE: CPT

## 2022-06-06 ENCOUNTER — OFFICE VISIT (OUTPATIENT)
Dept: FAMILY MEDICINE CLINIC | Age: 67
End: 2022-06-06
Payer: COMMERCIAL

## 2022-06-06 VITALS
DIASTOLIC BLOOD PRESSURE: 72 MMHG | HEIGHT: 72 IN | HEART RATE: 76 BPM | TEMPERATURE: 98 F | SYSTOLIC BLOOD PRESSURE: 130 MMHG | OXYGEN SATURATION: 97 % | WEIGHT: 264.6 LBS | BODY MASS INDEX: 35.84 KG/M2

## 2022-06-06 DIAGNOSIS — I10 BENIGN ESSENTIAL HYPERTENSION: ICD-10-CM

## 2022-06-06 DIAGNOSIS — E78.2 MIXED HYPERLIPIDEMIA: ICD-10-CM

## 2022-06-06 DIAGNOSIS — R06.02 SHORTNESS OF BREATH: ICD-10-CM

## 2022-06-06 DIAGNOSIS — Z13.6 SCREENING FOR AAA (ABDOMINAL AORTIC ANEURYSM): ICD-10-CM

## 2022-06-06 DIAGNOSIS — E11.9 CONTROLLED TYPE 2 DIABETES MELLITUS WITHOUT COMPLICATION, WITHOUT LONG-TERM CURRENT USE OF INSULIN (HCC): Primary | ICD-10-CM

## 2022-06-06 DIAGNOSIS — Z86.718 HISTORY OF RECURRENT DEEP VEIN THROMBOSIS (DVT): ICD-10-CM

## 2022-06-06 PROCEDURE — 3044F HG A1C LEVEL LT 7.0%: CPT | Performed by: NURSE PRACTITIONER

## 2022-06-06 PROCEDURE — 1123F ACP DISCUSS/DSCN MKR DOCD: CPT | Performed by: NURSE PRACTITIONER

## 2022-06-06 PROCEDURE — 99214 OFFICE O/P EST MOD 30 MIN: CPT | Performed by: NURSE PRACTITIONER

## 2022-06-06 RX ORDER — AMLODIPINE BESYLATE 10 MG/1
10 TABLET ORAL DAILY
Qty: 90 TABLET | Refills: 3 | Status: SHIPPED | OUTPATIENT
Start: 2022-06-06

## 2022-06-06 RX ORDER — LISINOPRIL AND HYDROCHLOROTHIAZIDE 20; 12.5 MG/1; MG/1
1 TABLET ORAL DAILY
Qty: 90 TABLET | Refills: 3 | Status: SHIPPED | OUTPATIENT
Start: 2022-06-06

## 2022-06-06 RX ORDER — ROSUVASTATIN CALCIUM 5 MG/1
TABLET, COATED ORAL
Qty: 90 TABLET | Refills: 3 | Status: SHIPPED | OUTPATIENT
Start: 2022-06-06

## 2022-06-06 SDOH — ECONOMIC STABILITY: FOOD INSECURITY: WITHIN THE PAST 12 MONTHS, YOU WORRIED THAT YOUR FOOD WOULD RUN OUT BEFORE YOU GOT MONEY TO BUY MORE.: NEVER TRUE

## 2022-06-06 SDOH — ECONOMIC STABILITY: FOOD INSECURITY: WITHIN THE PAST 12 MONTHS, THE FOOD YOU BOUGHT JUST DIDN'T LAST AND YOU DIDN'T HAVE MONEY TO GET MORE.: NEVER TRUE

## 2022-06-06 ASSESSMENT — ENCOUNTER SYMPTOMS
GASTROINTESTINAL NEGATIVE: 1
DIARRHEA: 0
CHEST TIGHTNESS: 0
ANAL BLEEDING: 0
COLOR CHANGE: 0
RESPIRATORY NEGATIVE: 1
BLOOD IN STOOL: 0
VOMITING: 0
NAUSEA: 0
COUGH: 0
ALLERGIC/IMMUNOLOGIC NEGATIVE: 1

## 2022-06-06 ASSESSMENT — SOCIAL DETERMINANTS OF HEALTH (SDOH): HOW HARD IS IT FOR YOU TO PAY FOR THE VERY BASICS LIKE FOOD, HOUSING, MEDICAL CARE, AND HEATING?: NOT HARD AT ALL

## 2022-06-06 ASSESSMENT — PATIENT HEALTH QUESTIONNAIRE - PHQ9
SUM OF ALL RESPONSES TO PHQ QUESTIONS 1-9: 0
SUM OF ALL RESPONSES TO PHQ QUESTIONS 1-9: 0
SUM OF ALL RESPONSES TO PHQ9 QUESTIONS 1 & 2: 0
2. FEELING DOWN, DEPRESSED OR HOPELESS: 0
SUM OF ALL RESPONSES TO PHQ QUESTIONS 1-9: 0
SUM OF ALL RESPONSES TO PHQ QUESTIONS 1-9: 0
1. LITTLE INTEREST OR PLEASURE IN DOING THINGS: 0

## 2022-06-06 NOTE — PATIENT INSTRUCTIONS
virus (varicella zoster virus). After a person recovers from chickenpox, the virus stays dormant (inactive) in the body. It can reactivate years later and cause shingles. If you had Zostavax in the recent past, you should wait at least eight weeks before getting Shingrix. Talk to your healthcare provider to determine the best time to get Shingrix. Shingrix is available in [facility] and pharmacies. To find doctors offices or pharmacies near you that offer the vaccine, visit [address] [place] [address]. If you have questions about Shingrix, talk with your healthcare provider. Who Should Not Get Shingrix? The side effects of the Shingrix are temporary, and usually last 2 to 3 days. While you may experience pain for a few days after getting Shingrix, the pain will be less severe than having shingles and the complications from the disease. You should not get Shingrix if you:  have ever had a severe allergic reaction to any component of the vaccine or after a dose of Shingrix   tested negative for immunity to varicella zoster virus. If you test negative, you should get chickenpox vaccine. currently have shingles   currently are pregnant or breastfeeding. Women who are pregnant or breastfeeding should wait to get Shingrix. If you have a minor acute (starts suddenly) illness, such as a cold, you may get Shingrix. But if you have a moderate or severe acute illness, you should usually wait until you recover before getting the vaccine. This includes anyone with a temperature of 101.3°F or higher. How Well Does Shingrix Work? Two doses of Shingrix provides strong protection against shingles and postherpetic neuralgia (PHN), the most common complication of shingles. In adults 48to 71years old who got two doses, Shingrix was 97% effective in preventing shingles; among adults 70 years and older, Shingrix was 91% effective.    In adults 48to 71years old who got two doses, Shingrix was 91% effective in preventing PHN; among adults 70 years and older, Shingrix was 89% effective. Shingrix protection remained high (more than 85%) in people 70 years and older throughout the four years following vaccination. Since your risk of shingles and PHN increases as you get older, it is important to have strong protection against shingles in your older years. Top of Page   What are the possible side effects of Shingrix? Studies show that Shingrix is safe. The vaccine helps your body create a strong defense against shingles. As a result, you are likely to have temporary side effects from getting the shots. The side effects may affect your ability to do normal daily activities for 2 to 3 days. Most people got a sore arm with mild or moderate pain after getting Shingrix, and some also had redness and swelling where they got the shot. Some people felt tired, had muscle pain, a headache, shivering, fever, stomach pain, or nausea. About 1 out of 6 people who got Shingrix experienced side effects that prevented them from doing regular activities. Symptoms went away on their own in about 2 to 3 days. Side effects were more common in younger people. You might have a reaction to the first or second dose of Shingrix, or both doses. If you experience side effects, you may choose to take over-the-counter pain medicine such as ibuprofen or acetaminophen. Severe allergic reactions to any vaccine are very rare. Signs of a severe allergic reaction can include hives, swelling of the face and throat, difficulty breathing, a fast heartbeat, dizziness, and weakness. These would start a few minutes to a few hours after the vaccination. If you have a severe allergic reaction or other emergency that cant wait, call 9-1-1 or go to the nearest hospital. Otherwise, call your doctor. If you experience side effects from Shingrix, you should report them to the Vaccine Adverse Event Reporting System (VAERS).  Your doctor might file this report, or you can do it yourself through the [employer] website, or by calling [phone]. If you have any questions about side effects from Shingrix, talk with your doctor. The shingles vaccine does not contain thimerosal (a preservative containing mercury). Top of Page   How Can I Pay For Shingrix? There are several ways shingles vaccine may be paid for:  Medicare  Medicare Part D plans cover the shingles vaccine, but there may be a cost to you depending on your plan. There may be a copay for the vaccine, or you may need to pay in full then get reimbursed for a certain amount. Medicare Part B does not cover the shingles vaccine. Medicaid  Medicaid may or may not cover the vaccine. Contact your insurer to find out. Private health insurance  Many private health insurance plans will cover the vaccine. Contact your insurer to find out. Vaccine assistance programs  Some pharmaceutical companies provide vaccines to eligible adults who cannot afford them. You may want to check with the vaccine , "[employer] about Shingrix. If you do not currently have health insurance, learn more about affordable health coverage options. To find doctors offices or pharmacies near you that offer the vaccine, visit [address] [place], [place] [address]way [facility].       Taken from the [facility]

## 2022-06-06 NOTE — PROGRESS NOTES
[facility]  [address] [facility]  Dept: [phone]    Blake Franklin is a 73 y.o. male who presents today for his medical conditions/complaintsas noted below. Blake Franklin is here today c/o Hypertension, Hyperlipidemia, Diabetes, and Discuss Labs    Past Medical History:   Diagnosis Date    Deep vein thrombosis (HCC)     x 2    Diabetes type 2, controlled ([address]     Linch filter in place     Hyperlipidemia     Hypertension     Obesity     Unspecified sleep apnea       Past Surgical History:   Procedure Laterality Date    COLONOSCOPY  8/26/14    KNEE ARTHROSCOPY Left     TONSILLECTOMY      VENA CAVA FILTER PLACEMENT      Linch Filter       Family History   Problem Relation Age of Onset    Diabetes Mother     Cancer Mother         Blood disorder     Cancer Father 79        Prostate cancer       Social History     Tobacco Use    Smoking status: Former Smoker     Packs/day: 2.50     Years: 20.00     Pack years: 50.00     Types: Cigarettes     Quit date: 2/12/2007     Years since quitting: 15.3    Smokeless tobacco: Never Used   Substance Use Topics    Alcohol use: Yes     Alcohol/week: 3.3 - 4.2 standard drinks     Types: 4 - 5 Standard drinks or equivalent per week     Comment: occasional / weekends       Current Outpatient Medications   Medication Sig Dispense Refill    rosuvastatin (CRESTOR) 5 MG tablet TAKE 1 TABLET BY MOUTH DAILY 90 tablet 0    metFORMIN (GLUCOPHAGE) 500 MG tablet Take 1 tablet by mouth daily 90 tablet 3    amLODIPine (NORVASC) 10 MG tablet Take 1 tablet by mouth daily 90 tablet 3    lisinopril-hydroCHLOROthiazide (PRINZIDE;ZESTORETIC) 20-12.5 MG per tablet Take 1 tablet by mouth daily 90 tablet 3    apixaban (ELIQUIS) 5 MG TABS tablet Take 1 tablet by mouth 2 times daily 180 tablet 3     No current facility-administered medications for this visit.      No Known Allergies      HPI:     HPI    Presents today c/o DM2 / HTN / HLD follow-up     DM2 - Metformin   Declines medication side effects   Not monitoring blood sugar at home   Hypoglycemia none  Declines any complications including neuropathy    Eye examination is due , admits to occ blurred vision   Weight is stable  Does not follow diabetic diet   Doesn't exercise on a regular basis      H/o HTN - amlodipine, lisinopril/hctz   Doesn't monitor blood pressure at home      H/o HLD - Crestor      H/o DVT x 2    , sits for extended periods of times  Affiliated [employer], has Fisher filter  Hematology Dr. Holden h/o multiple DVT / thrombocytopenia  DX hemochromatosis q6mo blood draws   Was told to avoid foods high in iron     Reports chronic dyspnea for a long period of time  Declines chest pain, coughing, or wheezing  Patient is not physically active   He is a former heavy smoker , follows w/ Pulmonary for [place]      Follows w/ [facility] ([place]) - compliant with CPAP machine      Orthopedics Dr. Keegan Powers (knee arthritis)    Health Maintenance:      Subjective:     Review of Systems   Constitutional: Negative. Negative for appetite change, chills, diaphoresis, fatigue, fever and unexpected weight change. HENT: Negative. Eyes: Positive for visual disturbance. Respiratory: Negative. Negative for cough and chest tightness. Cardiovascular: Negative. Negative for chest pain and leg swelling. Gastrointestinal: Negative. Negative for anal bleeding, blood in stool, diarrhea, nausea and vomiting. Endocrine: Negative. Genitourinary: Negative. Negative for difficulty urinating. Musculoskeletal: Negative. Skin: Negative. Negative for color change, pallor, rash and wound. Allergic/Immunologic: Negative. Neurological: Negative. Negative for syncope, facial asymmetry, weakness and numbness. Hematological: Negative. Psychiatric/Behavioral: Negative. Negative for dysphoric mood. The patient is not nervous/anxious.         Objective: Vitals:    06/06/22 0732   BP: 130/72   Pulse: 76   Temp: 98 °F (36.7 °C)   SpO2: 97%       Body mass index is 35.89 kg/m². Physical Exam  Constitutional:       General: He is not in acute distress. Appearance: Normal appearance. He is well-developed. He is obese. He is not ill-appearing, toxic-appearing or diaphoretic. HENT:      Head: Normocephalic and atraumatic. Right Ear: External ear normal.      Left Ear: External ear normal.      Nose: Nose normal.   Eyes:      General: No scleral icterus. Right eye: No discharge. Left eye: No discharge. Conjunctiva/sclera: Conjunctivae normal.      Pupils: Pupils are equal, round, and reactive to light. Neck:      Trachea: No tracheal deviation. Cardiovascular:      Rate and Rhythm: Normal rate and regular rhythm. Pulses:           Dorsalis pedis pulses are 2+ on the right side and 2+ on the left side. Heart sounds: Normal heart sounds. No murmur heard. No friction rub. No gallop. Pulmonary:      Effort: Pulmonary effort is normal. No tachypnea, accessory muscle usage or respiratory distress. Breath sounds: Normal breath sounds. No stridor. No decreased breath sounds, wheezing, rhonchi or rales. Abdominal:      Palpations: Abdomen is soft. Musculoskeletal:         General: No tenderness or deformity. Normal range of motion. Cervical back: Normal range of motion and neck supple. Feet:      Right foot:      Skin integrity: Skin integrity normal. No ulcer, blister, skin breakdown, erythema, warmth, callus, dry skin or fissure. Toenail Condition: Right toenails are abnormally thick and long. Left foot:      Skin integrity: Skin integrity normal. No ulcer, blister, skin breakdown, erythema, warmth, callus, dry skin or fissure. Toenail Condition: Left toenails are abnormally thick and long. Skin:     General: Skin is warm and dry. Coloration: Skin is not pale.       Findings: No erythema or rash. Neurological:      Mental Status: He is alert and oriented to person, place, and time. GCS: GCS eye subscore is 4. GCS verbal subscore is 5. GCS motor subscore is 6. Gait: Gait is intact. Gait normal.   Psychiatric:         Speech: Speech normal.         Behavior: Behavior normal.         Thought Content: Thought content normal.         Judgment: Judgment normal.           Assessment:         1. Controlled type 2 diabetes mellitus without complication, without long-term current use of insulin ([facility]    2. Benign essential hypertension    3. Mixed hyperlipidemia    4. History of recurrent deep vein thrombosis (DVT)    5. Screening for AAA (abdominal aortic aneurysm)    6. Shortness of breath        Plan:     1. Controlled type 2 diabetes mellitus without complication, without long-term current use of insulin ([facility])    - metFORMIN (GLUCOPHAGE) 500 MG tablet; Take 1 tablet by mouth daily  Dispense: 90 tablet; Refill: 3  - HM DIABETES FOOT EXAM    A1C well controlled at goal  Lifestyle modifications encouraged   F/u 3-4 months    Instructed goal A1C 6.5-7 %   Advised medication benefits and side effects   Advised dietary recommendations including avoidance of carbs and concentrated sweets  Importance of daily physical activity and weight loss in disease management  Discussed importance of statin and ASCVD benefits  Advised yearly diabetic eye examinations  Advised routine monitoring of feet  Importance to notify if hypoglycemia sx and directions  Importance of regular meals with insulin/medications   Adverse effects of uncontrolled diabetes including heart disease, CVA, kidney disease, neuropathy, retinopathy, PVD etc.  Importance of routine follow-up every 3 months is key    2. Benign essential hypertension    - amLODIPine (NORVASC) 10 MG tablet; Take 1 tablet by mouth daily  Dispense: 90 tablet; Refill: 3  - lisinopril-hydroCHLOROthiazide (PRINZIDE;ZESTORETIC) 20-12.5 MG per tablet;  Take 1 tablet by mouth daily  Dispense: 90 tablet; Refill: 3    BP controlled, continue meds  Labs up to date and reviewed  Lifestyle modifications encouraged     3. Mixed hyperlipidemia    - rosuvastatin (CRESTOR) 5 MG tablet; TAKE 1 TABLET BY MOUTH DAILY  Dispense: 90 tablet; Refill: 3    Lipids controlled, continue statin, lipids reviewed     4. History of recurrent deep vein thrombosis (DVT)    - apixaban (ELIQUIS) 5 MG TABS tablet; Take 1 tablet by mouth 2 times daily  Dispense: 180 tablet; Refill: 3    Follows w/ Hematology     5. Screening for AAA (abdominal aortic aneurysm)    -  AAA SCREENING; Future    6. Shortness of breath    Recommended increasing activity levels & cardiac / pulmonary testing secondary to risk factors - pt declined at this time     Discussed use, benefit, and side effects of prescribed medications. All patient questions answered. Pt voiced understanding. Reviewed health maintenance. Instructed to continue current medications, diet and exercise. Patient agreedwith treatment plan. Follow up as directed.      Electronically signed by ADRI Santana CNP on 6/6/2022

## 2022-06-20 ENCOUNTER — HOSPITAL ENCOUNTER (OUTPATIENT)
Dept: VASCULAR LAB | Age: 67
Discharge: HOME | End: 2022-06-20
Payer: COMMERCIAL

## 2022-06-20 DIAGNOSIS — Z13.6 SCREENING FOR AAA (ABDOMINAL AORTIC ANEURYSM): ICD-10-CM

## 2022-06-20 PROCEDURE — 76706 US ABDL AORTA SCREEN AAA: CPT

## 2022-08-24 NOTE — TELEPHONE ENCOUNTER
Keyanna De Los Santos is calling to request a refill on the following medication(s):    Medication Request:  Requested Prescriptions     Pending Prescriptions Disp Refills    tamsulosin (FLOMAX) 0.4 MG capsule [Pharmacy Med Name: TAMSULOSIN 0.4MG CAPSULES] 90 capsule 0     Sig: TAKE ONE CAPSULE BY MOUTH DAILY       Last Visit Date (If Applicable):  7/1/9241    Next Visit Date:    9/19/2022

## 2022-08-25 RX ORDER — TAMSULOSIN HYDROCHLORIDE 0.4 MG/1
CAPSULE ORAL
Qty: 90 CAPSULE | Refills: 3 | Status: SHIPPED | OUTPATIENT
Start: 2022-08-25 | End: 2022-09-19

## 2022-09-19 ENCOUNTER — OFFICE VISIT (OUTPATIENT)
Dept: FAMILY MEDICINE CLINIC | Age: 67
End: 2022-09-19
Payer: COMMERCIAL

## 2022-09-19 VITALS
DIASTOLIC BLOOD PRESSURE: 68 MMHG | SYSTOLIC BLOOD PRESSURE: 122 MMHG | BODY MASS INDEX: 36.05 KG/M2 | HEART RATE: 67 BPM | TEMPERATURE: 97.9 F | OXYGEN SATURATION: 95 % | WEIGHT: 265.8 LBS

## 2022-09-19 DIAGNOSIS — Z86.718 HISTORY OF RECURRENT DEEP VEIN THROMBOSIS (DVT): ICD-10-CM

## 2022-09-19 DIAGNOSIS — I10 BENIGN ESSENTIAL HYPERTENSION: ICD-10-CM

## 2022-09-19 DIAGNOSIS — R39.11 BENIGN PROSTATIC HYPERPLASIA WITH URINARY HESITANCY: ICD-10-CM

## 2022-09-19 DIAGNOSIS — N40.1 BENIGN PROSTATIC HYPERPLASIA WITH URINARY HESITANCY: ICD-10-CM

## 2022-09-19 DIAGNOSIS — E78.2 MIXED HYPERLIPIDEMIA: ICD-10-CM

## 2022-09-19 DIAGNOSIS — Z12.5 SCREENING FOR PROSTATE CANCER: ICD-10-CM

## 2022-09-19 DIAGNOSIS — E11.9 CONTROLLED TYPE 2 DIABETES MELLITUS WITHOUT COMPLICATION, WITHOUT LONG-TERM CURRENT USE OF INSULIN (HCC): Primary | ICD-10-CM

## 2022-09-19 PROCEDURE — 99214 OFFICE O/P EST MOD 30 MIN: CPT | Performed by: NURSE PRACTITIONER

## 2022-09-19 PROCEDURE — 1123F ACP DISCUSS/DSCN MKR DOCD: CPT | Performed by: NURSE PRACTITIONER

## 2022-09-19 PROCEDURE — 3044F HG A1C LEVEL LT 7.0%: CPT | Performed by: NURSE PRACTITIONER

## 2022-09-19 ASSESSMENT — ENCOUNTER SYMPTOMS
COUGH: 0
VOMITING: 0
COLOR CHANGE: 0
EYES NEGATIVE: 1
SHORTNESS OF BREATH: 0
CHEST TIGHTNESS: 0
NAUSEA: 0
DIARRHEA: 0
RESPIRATORY NEGATIVE: 1
ALLERGIC/IMMUNOLOGIC NEGATIVE: 1
GASTROINTESTINAL NEGATIVE: 1
WHEEZING: 0

## 2022-09-19 ASSESSMENT — PATIENT HEALTH QUESTIONNAIRE - PHQ9
2. FEELING DOWN, DEPRESSED OR HOPELESS: 0
SUM OF ALL RESPONSES TO PHQ QUESTIONS 1-9: 0
SUM OF ALL RESPONSES TO PHQ9 QUESTIONS 1 & 2: 0
SUM OF ALL RESPONSES TO PHQ QUESTIONS 1-9: 0
SUM OF ALL RESPONSES TO PHQ QUESTIONS 1-9: 0
1. LITTLE INTEREST OR PLEASURE IN DOING THINGS: 0
SUM OF ALL RESPONSES TO PHQ QUESTIONS 1-9: 0

## 2022-09-19 NOTE — PROGRESS NOTES
[facility]  [address] [facility]  Dept: [phone]    Ezekiel Morin is a 73 y.o. male who presents today for his medical conditions/complaintsas noted below. Ezekiel Morin is here today c/o Diabetes, Hypertension, and Hyperlipidemia    Past Medical History:   Diagnosis Date    Deep vein thrombosis (HCC)     x 2    Diabetes type 2, controlled ([address]     Angelina filter in place     Hyperlipidemia     Hypertension     Obesity     Unspecified sleep apnea       Past Surgical History:   Procedure Laterality Date    COLONOSCOPY  8/26/14    KNEE ARTHROSCOPY Left     TONSILLECTOMY      VENA CAVA FILTER PLACEMENT      Angelina Filter       Family History   Problem Relation Age of Onset    Diabetes Mother     Cancer Mother         Blood disorder     Cancer Father 79        Prostate cancer       Social History     Tobacco Use    Smoking status: Former     Packs/day: 2.50     Years: 20.00     Pack years: 50.00     Types: Cigarettes     Quit date: 2/12/2007     Years since quitting: 15.6    Smokeless tobacco: Never   Substance Use Topics    Alcohol use: Yes     Alcohol/week: 3.3 - 4.2 standard drinks     Types: 4 - 5 Standard drinks or equivalent per week     Comment: occasional / weekends       Current Outpatient Medications   Medication Sig Dispense Refill    tamsulosin (FLOMAX) 0.4 MG capsule TAKE ONE CAPSULE BY MOUTH DAILY 90 capsule 3    rosuvastatin (CRESTOR) 5 MG tablet TAKE 1 TABLET BY MOUTH DAILY 90 tablet 3    metFORMIN (GLUCOPHAGE) 500 MG tablet Take 1 tablet by mouth daily 90 tablet 3    amLODIPine (NORVASC) 10 MG tablet Take 1 tablet by mouth daily 90 tablet 3    lisinopril-hydroCHLOROthiazide (PRINZIDE;ZESTORETIC) 20-12.5 MG per tablet Take 1 tablet by mouth daily 90 tablet 3    apixaban (ELIQUIS) 5 MG TABS tablet Take 1 tablet by mouth 2 times daily 180 tablet 3     No current facility-administered medications for this visit.      No Known Allergies      HPI: HPI    Presents today c/o DM2 / HTN / HLD follow-up     DM2 - Metformin   Declines medication side effects   Not monitoring blood sugar at home   Hypoglycemia none  Declines any complications including neuropathy    Weight is stable  Does not follow diabetic diet   Doesn't exercise on a regular basis      H/o HTN - amlodipine, lisinopril/hctz   Doesn't monitor blood pressure at home      H/o HLD - Crestor     H/o difficulty urinating  Was told his prostate was normal by Urology in the past  Stopped Flomax because it didn't work  C/o difficulty urinating q AM      H/o DVT x 2    , sits for extended periods of times  Affiliated [employer] has [place] filter  Hematology Dr. Chen Patton h/o multiple DVT / thrombocytopenia  DX hemochromatosis q6mo blood draws   Was told to avoid foods high in iron      Follows w/ [facility] ([place]) - compliant with Lucrecia Libman Dr. Rodena Stanford (knee arthritis)    Health Maintenance:      Subjective:     Review of Systems   Constitutional: Negative. Negative for appetite change, chills, diaphoresis, fatigue, fever and unexpected weight change. HENT: Negative. Eyes: Negative. Respiratory: Negative. Negative for cough, chest tightness, shortness of breath and wheezing. Cardiovascular: Negative. Negative for chest pain and leg swelling. Gastrointestinal: Negative. Negative for diarrhea, nausea and vomiting. Endocrine: Negative. Genitourinary:  Positive for difficulty urinating. Musculoskeletal: Negative. Skin: Negative. Negative for color change, pallor, rash and wound. Allergic/Immunologic: Negative. Neurological: Negative. Negative for dizziness, seizures, syncope, light-headedness and headaches. Hematological: Negative. Psychiatric/Behavioral: Negative. Negative for dysphoric mood. The patient is not nervous/anxious.       Objective:     Vitals:    09/19/22 0752   BP: 122/68   Pulse: 67   Temp: 97.9 °F (36.6 °C) SpO2: 95%       Body mass index is 36.05 kg/m². Physical Exam  Constitutional:       General: He is not in acute distress. Appearance: Normal appearance. He is well-developed. He is obese. He is not ill-appearing, toxic-appearing or diaphoretic. HENT:      Head: Normocephalic and atraumatic. Right Ear: External ear normal.      Left Ear: External ear normal.      Nose: Nose normal.   Eyes:      General: No scleral icterus. Right eye: No discharge. Left eye: No discharge. Conjunctiva/sclera: Conjunctivae normal.      Pupils: Pupils are equal, round, and reactive to light. Neck:      Trachea: No tracheal deviation. Cardiovascular:      Rate and Rhythm: Normal rate and regular rhythm. Heart sounds: Normal heart sounds. No murmur heard. No friction rub. No gallop. Pulmonary:      Effort: Pulmonary effort is normal. No tachypnea, accessory muscle usage or respiratory distress. Breath sounds: Normal breath sounds. No stridor. No decreased breath sounds, wheezing, rhonchi or rales. Abdominal:      Palpations: Abdomen is soft. Musculoskeletal:         General: No tenderness or deformity. Normal range of motion. Cervical back: Normal range of motion and neck supple. Skin:     General: Skin is warm and dry. Coloration: Skin is not pale. Findings: No erythema or rash. Neurological:      Mental Status: He is alert and oriented to person, place, and time. GCS: GCS eye subscore is 4. GCS verbal subscore is 5. GCS motor subscore is 6. Gait: Gait normal.   Psychiatric:         Speech: Speech normal.         Behavior: Behavior normal.         Thought Content: Thought content normal.         Judgment: Judgment normal.         Assessment:         1. Controlled type 2 diabetes mellitus without complication, without long-term current use of insulin ([facility] [address]    2. Benign essential hypertension    3. Mixed hyperlipidemia    4.  Benign prostatic

## 2022-09-20 ENCOUNTER — HOSPITAL ENCOUNTER (OUTPATIENT)
Age: 67
Discharge: HOME | End: 2022-09-20
Payer: COMMERCIAL

## 2022-09-20 DIAGNOSIS — Z12.5 SCREENING FOR PROSTATE CANCER: ICD-10-CM

## 2022-09-20 DIAGNOSIS — E11.9 CONTROLLED TYPE 2 DIABETES MELLITUS WITHOUT COMPLICATION, WITHOUT LONG-TERM CURRENT USE OF INSULIN (HCC): ICD-10-CM

## 2022-09-20 LAB
ESTIMATED AVERAGE GLUCOSE: 134 MG/DL
HBA1C MFR BLD: 6.3 % (ref 4–6)
PROSTATE SPECIFIC ANTIGEN: 0.68 NG/ML

## 2022-09-20 PROCEDURE — G0103 PSA SCREENING: HCPCS

## 2022-09-20 PROCEDURE — 36415 COLL VENOUS BLD VENIPUNCTURE: CPT

## 2022-09-20 PROCEDURE — 83036 HEMOGLOBIN GLYCOSYLATED A1C: CPT

## 2022-11-07 ENCOUNTER — HOSPITAL ENCOUNTER (OUTPATIENT)
Age: 67
Discharge: HOME | End: 2022-11-07
Payer: COMMERCIAL

## 2022-11-07 LAB
HCT VFR BLD CALC: 41.6 % (ref 41–53)
HEMOGLOBIN: 13.6 G/DL (ref 13.5–17.5)
MCH RBC QN AUTO: 29.6 PG (ref 26–34)
MCHC RBC AUTO-ENTMCNC: 32.8 G/DL (ref 31–37)
MCV RBC AUTO: 90.4 FL (ref 80–100)
PDW BLD-RTO: 13.3 % (ref 11.5–14.9)
PLATELET # BLD: 134 K/UL (ref 150–450)
PMV BLD AUTO: 10.5 FL (ref 6–12)
RBC # BLD: 4.6 M/UL (ref 4.5–5.9)
VITAMIN D 25-HYDROXY: 23.8 NG/ML
WBC # BLD: 4.8 K/UL (ref 3.5–11)

## 2022-11-07 PROCEDURE — 36415 COLL VENOUS BLD VENIPUNCTURE: CPT

## 2022-11-07 PROCEDURE — 82306 VITAMIN D 25 HYDROXY: CPT

## 2022-11-07 PROCEDURE — 85027 COMPLETE CBC AUTOMATED: CPT

## 2022-11-18 ENCOUNTER — HOSPITAL ENCOUNTER (OUTPATIENT)
Age: 67
Discharge: HOME | End: 2022-11-18
Payer: COMMERCIAL

## 2022-11-18 DIAGNOSIS — D69.6 THROMBOCYTOPENIA (HCC): ICD-10-CM

## 2022-11-18 DIAGNOSIS — D69.6 THROMBOCYTOPENIA (HCC): Primary | ICD-10-CM

## 2022-11-18 LAB
ABSOLUTE EOS #: 0.2 K/UL (ref 0–0.4)
ABSOLUTE LYMPH #: 2.3 K/UL (ref 1–4.8)
ABSOLUTE MONO #: 0.5 K/UL (ref 0.1–1.3)
BASOPHILS # BLD: 1 % (ref 0–2)
BASOPHILS ABSOLUTE: 0 K/UL (ref 0–0.2)
EOSINOPHILS RELATIVE PERCENT: 3 % (ref 0–4)
FERRITIN: 235 NG/ML (ref 30–400)
HCT VFR BLD CALC: 42.2 % (ref 41–53)
HEMOGLOBIN: 14.3 G/DL (ref 13.5–17.5)
LYMPHOCYTES # BLD: 39 % (ref 24–44)
MCH RBC QN AUTO: 30.6 PG (ref 26–34)
MCHC RBC AUTO-ENTMCNC: 34 G/DL (ref 31–37)
MCV RBC AUTO: 90 FL (ref 80–100)
MONOCYTES # BLD: 8 % (ref 1–7)
PDW BLD-RTO: 12.7 % (ref 11.5–14.9)
PLATELET # BLD: 140 K/UL (ref 150–450)
PMV BLD AUTO: 10.5 FL (ref 6–12)
RBC # BLD: 4.69 M/UL (ref 4.5–5.9)
SEG NEUTROPHILS: 49 % (ref 36–66)
SEGMENTED NEUTROPHILS ABSOLUTE COUNT: 2.8 K/UL (ref 1.3–9.1)
WBC # BLD: 5.8 K/UL (ref 3.5–11)

## 2022-11-18 PROCEDURE — 36415 COLL VENOUS BLD VENIPUNCTURE: CPT

## 2022-11-18 PROCEDURE — 85025 COMPLETE CBC W/AUTO DIFF WBC: CPT

## 2022-11-18 PROCEDURE — 82728 ASSAY OF FERRITIN: CPT

## 2022-11-21 ENCOUNTER — OFFICE VISIT (OUTPATIENT)
Dept: ONCOLOGY | Age: 67
End: 2022-11-21
Payer: COMMERCIAL

## 2022-11-21 ENCOUNTER — HOSPITAL ENCOUNTER (OUTPATIENT)
Dept: INFUSION THERAPY | Age: 67
Discharge: HOME | End: 2022-11-21

## 2022-11-21 ENCOUNTER — TELEPHONE (OUTPATIENT)
Dept: ONCOLOGY | Age: 67
End: 2022-11-21

## 2022-11-21 VITALS
WEIGHT: 270 LBS | BODY MASS INDEX: 36.62 KG/M2 | DIASTOLIC BLOOD PRESSURE: 74 MMHG | TEMPERATURE: 97.9 F | HEART RATE: 66 BPM | SYSTOLIC BLOOD PRESSURE: 122 MMHG

## 2022-11-21 DIAGNOSIS — E83.110 HEREDITARY HEMOCHROMATOSIS (HCC): ICD-10-CM

## 2022-11-21 DIAGNOSIS — D69.6 THROMBOCYTOPENIA (HCC): Primary | ICD-10-CM

## 2022-11-21 PROCEDURE — 99214 OFFICE O/P EST MOD 30 MIN: CPT | Performed by: INTERNAL MEDICINE

## 2022-11-21 PROCEDURE — 3074F SYST BP LT 130 MM HG: CPT | Performed by: INTERNAL MEDICINE

## 2022-11-21 PROCEDURE — 99211 OFF/OP EST MAY X REQ PHY/QHP: CPT | Performed by: INTERNAL MEDICINE

## 2022-11-21 PROCEDURE — 1123F ACP DISCUSS/DSCN MKR DOCD: CPT | Performed by: INTERNAL MEDICINE

## 2022-11-21 PROCEDURE — 3078F DIAST BP <80 MM HG: CPT | Performed by: INTERNAL MEDICINE

## 2022-11-21 NOTE — PATIENT INSTRUCTIONS
Clear for left knee replacement  No phlebotomy needed today  RV 6 months with CBC, ferritin before RV  Possible Therapeutic phlebotomy at RV

## 2022-11-21 NOTE — TELEPHONE ENCOUNTER
AVS from 11/21/22    Clear for left knee replacement  No phlebotomy needed today  RV 6 months with CBC, ferritin before RV  Possible Therapeutic phlebotomy at RV    Nichole rn notified no phleb today  Rv scheduled for 5/22/23 @ 10am with possible phleb to follow.  Pt will have labs drawn one week prior    PT was given AVS and appt schedule

## 2022-11-21 NOTE — PROGRESS NOTES
_        Chief Complaint   Patient presents with    Follow-up     Review status of disease    Discuss Labs    Other     Having surgery on his left knee on 12/5     DIAGNOSIS:        Recurrent bilateral DVTs and pulmonary embolism  Negative hypercoagulability work-up  Lifelong anticoagulation candidate  Hereditary hemochromatosis  Persistent mild thrombocytopenia     CURRENT THERAPY:         Therapeutic phlebotomy as needed for hemochromatosis    BRIEF CASE HISTORY:      Mr. Sanjay Mejia is a very pleasant 78 y.o. male with history of multiple co morbidities as listed. The patient is referred for evaluation for the management of thrombocytopenia. Patient has history of bilateral DVTs and pulmonary embolism for about 8 years. Initially he was maintained on Coumadin and switched to Eliquis over the last 2 years. He never had any thrombosis events while on treatment. He had hypercoagulability work-up done in 2014 which was negative. Patient had a Angel[place] filter inserted and was maintained on anticoagulation. Patient was noted to have mild thrombocytopenia. Patient has easy bruising secondary to anticoagulation. He denies any active bleeding. No nosebleed or gum bleed. No melena or hematochezia. No hematemesis. No fever. Patient quit smoking about 8 years ago. Denies alcohol drinking. .   INTERIM HISTORY:   Patient seen for follow-up after lab work-up for hemochromatosis. He had thrombocytopenia secondary to liver disease. Platelets are stable. No active bleeding. No bruising. Patient feels tired. Otherwise no other side effects. No headaches or dizziness. No TIA or CVA-like symptoms. He is getting therapeutic phlebotomy every 3 months. No complications.       PAST MEDICAL HISTORY: has a past medical history of Deep vein thrombosis ([address] Diabetes type 2, controlled ([address] [place] filter in place, Hyperlipidemia, Hypertension, Obesity, and Unspecified sleep apnea. PAST SURGICAL HISTORY: has a past surgical history that includes Tonsillectomy; Knee arthroscopy (Left); Colonoscopy (8/26/14); and Vena Cava Filter Placement. CURRENT MEDICATIONS:  has a current medication list which includes the following prescription(s): rosuvastatin, metformin, amlodipine, lisinopril-hydrochlorothiazide, and apixaban. ALLERGIES:  has No Known Allergies. FAMILY HISTORY: Family history of hemochromatosis with mother and sister. Otherwise negative for any hematological or oncological conditions. SOCIAL HISTORY:  reports that he quit smoking about 15 years ago. His smoking use included cigarettes. He has a 50.00 pack-year smoking history. He has never used smokeless tobacco. He reports current alcohol use of about 3.3 - 4.2 standard drinks per week. He reports that he does not use drugs. REVIEW OF SYSTEMS:     General: Positive for weakness and fatigue. . No unanticipated weight loss or decreased appetite. No fever or chills. Eyes: No blurred vision, eye pain or double vision. Ears: No hearing problems or drainage. No tinnitus. Throat: No sore throat, problems with swallowing or dysphagia. Respiratory: No cough, sputum or hemoptysis. No shortness of breath. No pleuritic chest pain. Cardiovascular: No chest pain, orthopnea or PND. No lower extremity edema. No palpitation. Gastrointestinal: No problems with swallowing. No abdominal pain or bloating. No nausea or vomiting. No diarrhea or constipation. No GI bleeding. Genitourinary: No dysuria, hematuria, frequency or urgency. Musculoskeletal: No muscle aches or pains. No limitation of movement. No back pain. No gait disturbance, No joint complaints. Dermatologic: No skin rashes or pruritus. No skin lesions or discolorations. Psychiatric: No depression, anxiety, or stress or signs of schizophrenia. No change in mood or affect.     Hematologic: No history of bleeding tendency. No bruises or ecchymosis. No history of clotting problems. Infectious disease: No fever, chills or frequent infections. Endocrine: No polydipsia or polyuria. No temperature intolerance. Neurologic: No headaches or dizziness. No weakness or numbness of the extremities. No changes in balance, coordination,  memory, mentation, behavior. Allergic/Immunologic: No nasal congestion or hives. No repeated infections. PHYSICAL EXAM:  The patient is not in acute distress. Vital signs: Blood pressure 122/74, pulse 66, temperature 97.9 °F (36.6 °C), temperature source Temporal, weight 270 lb (122.5 kg).      General appearance - well appearing, not in pain or distress  Mental status - good mood, alert and oriented  Eyes - pupils equal and reactive, extraocular eye movements intact  Ears - bilateral TM's and external ear canals normal  Nose - normal and patent, no erythema, discharge or polyps  Mouth - mucous membranes moist, pharynx normal without lesions  Neck - supple, no significant adenopathy  Lymphatics - no palpable lymphadenopathy, no hepatosplenomegaly  Chest - clear to auscultation, no wheezes, rales or rhonchi, symmetric air entry  Heart - normal rate, regular rhythm, normal S1, S2, no murmurs, rubs, clicks or gallops  Abdomen - soft, nontender, nondistended, no masses or organomegaly  Neurological - alert, oriented, normal speech, no focal findings or movement disorder noted  Musculoskeletal - no joint tenderness, deformity or swelling  Extremities - peripheral pulses normal, no pedal edema, no clubbing or cyanosis  Skin - normal coloration and turgor, no rashes, no suspicious skin lesions noted     Review of Diagnostic data:   Lab Results   Component Value Date    WBC 5.8 11/18/2022    HGB 14.3 11/18/2022    HCT 42.2 11/18/2022    MCV 90.0 11/18/2022     (L) 11/18/2022       Chemistry        Component Value Date/Time     05/31/2022 0732    K 4.3 05/31/2022 0732     05/31/2022 0732    CO2 28 05/31/2022 0732    BUN 22 05/31/2022 0732    CREATININE 1.17 05/31/2022 0732        Component Value Date/Time    CALCIUM 9.3 05/31/2022 0732    ALKPHOS 80 05/31/2022 0732    AST 23 05/31/2022 0732    ALT 18 05/31/2022 0732    BILITOT 0.39 05/31/2022 0732            IMPRESSION:   Recurrent bilateral DVTs and pulmonary embolism  Negative hypercoagulability work-up  Lifelong anticoagulation candidate  Hereditary hemochromatosis  Persistent mild thrombocytopenia      PLAN: Labs were reviewed and explained to the patient. Genetic testing for hemochromatosis was positive. I explained to the patient the nature of these abnormalities and management plans. Patient's ferritin level is still elevated. Platelets are stable. Discussed further management. We will make therapeutic phlebotomy every 6 months. We will continue to monitor CBC and ferritin level. We will check his labs including iron studies in 6 months. Obviously he had recurrent episodes of DVTs and pulmonary embolism. Patient would be a candidate for lifelong anticoagulation despite negative hypercoagulability work-up due to the recurrent episodes of thrombosis. He will be continued on Eliquis. Tolerated well. Patient is having persistent mild thrombocytopenia with no contraindications for continued anticoagulation. Mild thrombocytopenia is probably related to liver and spleen. Possibly caused by hemochromatosis. Labs will be monitored. Clear for left knee replacement on 12/5/22. We will make further recommendations based on the results. Patient's questions were answered to the best of his satisfaction and he verbalized full understanding and agreement.

## 2022-11-29 ENCOUNTER — OFFICE VISIT (OUTPATIENT)
Dept: FAMILY MEDICINE CLINIC | Age: 67
End: 2022-11-29
Payer: COMMERCIAL

## 2022-11-29 VITALS
HEART RATE: 70 BPM | WEIGHT: 274 LBS | TEMPERATURE: 97.3 F | SYSTOLIC BLOOD PRESSURE: 117 MMHG | DIASTOLIC BLOOD PRESSURE: 74 MMHG | OXYGEN SATURATION: 96 % | BODY MASS INDEX: 37.16 KG/M2

## 2022-11-29 DIAGNOSIS — Z01.818 PRE-OPERATIVE CLEARANCE: Primary | ICD-10-CM

## 2022-11-29 PROCEDURE — 3074F SYST BP LT 130 MM HG: CPT

## 2022-11-29 PROCEDURE — 1123F ACP DISCUSS/DSCN MKR DOCD: CPT

## 2022-11-29 PROCEDURE — 3078F DIAST BP <80 MM HG: CPT

## 2022-11-29 PROCEDURE — 99203 OFFICE O/P NEW LOW 30 MIN: CPT

## 2022-11-29 ASSESSMENT — ENCOUNTER SYMPTOMS
COUGH: 0
EYE PAIN: 0
SHORTNESS OF BREATH: 0
VOMITING: 0
ABDOMINAL PAIN: 0
NAUSEA: 0
DIARRHEA: 0
COLOR CHANGE: 0
WHEEZING: 0
CONSTIPATION: 0
EYE DISCHARGE: 0
CHEST TIGHTNESS: 0
BACK PAIN: 0
SORE THROAT: 0

## 2022-11-29 ASSESSMENT — PATIENT HEALTH QUESTIONNAIRE - PHQ9
SUM OF ALL RESPONSES TO PHQ QUESTIONS 1-9: 0
2. FEELING DOWN, DEPRESSED OR HOPELESS: 0
SUM OF ALL RESPONSES TO PHQ9 QUESTIONS 1 & 2: 0
1. LITTLE INTEREST OR PLEASURE IN DOING THINGS: 0

## 2022-11-29 NOTE — PROGRESS NOTES
[address]  Dept: [phone]    Francisco Javier Bell is a 78 y.o. male who presents for a preoperative physical examination. He is scheduled to have left total knee done by Dr. Alejandra Chirinos at [facility] on 12/5/22. Any recent illnesses that will interfere with the upcoming surgery? none  Last surgical procedure - rotator cuff repair 10 yrs ago. Type of anesthesia used general. Problems with the procedure or anesthesia? none    Conditioning (equivalent to 4 METs) --  1. Can you walk up 2 flights of stairs? yes  2. Can you run a 'short distance'? yes  3. Can you walk up a hill 1-2 blocks? yes  4. Can you mariann 2 bags of groceries up 1 flight of stairs? yes  5. Can you walk 2-4 blocks (flat)? yes    Past Medical History:   Diagnosis Date    Deep vein thrombosis (HCC)     x 2    Diabetes type 2, controlled ([facility] [address]     [place] filter in place     Hyperlipidemia     Hypertension     Obesity     Unspecified sleep apnea       Past Surgical History:   Procedure Laterality Date    COLONOSCOPY  8/26/14    KNEE ARTHROSCOPY Left     TONSILLECTOMY      VENA CAVA FILTER PLACEMENT      [place] Filter       Family History   Problem Relation Age of Onset    Diabetes Mother     Cancer Mother         Blood disorder     Cancer Father 79        Prostate cancer       Social History     Tobacco Use    Smoking status: Former     Packs/day: 2.50     Years: 20.00     Pack years: 50.00     Types: Cigarettes     Quit date: 2/12/2007     Years since quitting: 15.8    Smokeless tobacco: Never   Substance Use Topics    Alcohol use:  Yes     Alcohol/week: 3.3 - 4.2 standard drinks     Types: 4 - 5 Standard drinks or equivalent per week     Comment: occasional / weekends       Current Outpatient Medications   Medication Sig Dispense Refill    vitamin D 25 MCG (1000 UT) CAPS Take 2,000 Units by mouth 2 times daily (with meals)      rosuvastatin (CRESTOR) 5 MG tablet TAKE 1 TABLET BY MOUTH DAILY 90 tablet 3    metFORMIN (GLUCOPHAGE) 500 MG tablet Take 1 tablet by mouth daily 90 tablet 3    amLODIPine (NORVASC) 10 MG tablet Take 1 tablet by mouth daily 90 tablet 3    lisinopril-hydroCHLOROthiazide (PRINZIDE;ZESTORETIC) 20-12.5 MG per tablet Take 1 tablet by mouth daily 90 tablet 3    apixaban (ELIQUIS) 5 MG TABS tablet Take 1 tablet by mouth 2 times daily 180 tablet 3    Cholecalciferol (VITAMIN D3) 25 MCG (1000 UT) CAPS TAKE 2 CAPSULES BY MOUTH IN THE MORNING AND 2 CAPSULES BY MOUTH IN THE EVENING WITH MEALS       No current facility-administered medications for this visit. No Known Allergies    :     HPI    Pt here today, accompanied by his wife for pre-operative clearance  Pre-op labs and EKG were performed via [facility] 11/22/22 and reviewed, no acute findings. EKG NSR, no changes from previous exam in 2015. Pt was cleared by Heme/Onc at last appt 11/21/22 d/t hx of recurrent DVT along w/ hemochromatosis. Plan for anticoagulation management pre and post op per [facility]--pt reports Dr Yoly Slater is aware of plan.     :     Review of Systems   Constitutional:  Negative for activity change, appetite change, chills, fatigue, fever and unexpected weight change. HENT:  Negative for congestion, ear pain and sore throat. Eyes:  Negative for pain, discharge and visual disturbance. Respiratory:  Negative for cough, chest tightness, shortness of breath and wheezing. Cardiovascular:  Negative for chest pain, palpitations and leg swelling. Gastrointestinal:  Negative for abdominal pain, constipation, diarrhea, nausea and vomiting. Genitourinary:  Negative for difficulty urinating and dysuria. Musculoskeletal:  Positive for arthralgias (chronic left knee). Negative for back pain, gait problem and neck pain. Skin:  Negative for color change, rash and wound. Neurological:  Negative for dizziness, seizures, syncope, weakness, light-headedness, numbness and headaches.    Psychiatric/Behavioral: Negative for confusion.      :     Vitals:    11/29/22 1430   BP: 117/74   Pulse: 70   Temp: 97.3   SpO2: 96%       Body mass index is 37.16 kg/m². Physical Exam  Vitals and nursing note reviewed. Constitutional:       General: He is not in acute distress. Appearance: Normal appearance. He is well-developed. He is obese. He is not ill-appearing, toxic-appearing or diaphoretic. HENT:      Head: Normocephalic and atraumatic. Right Ear: External ear normal.      Left Ear: External ear normal.      Nose: Nose normal.   Eyes:      General: No scleral icterus. Right eye: No discharge. Left eye: No discharge. Conjunctiva/sclera: Conjunctivae normal.      Pupils: Pupils are equal, round, and reactive to light. Neck:      Vascular: No carotid bruit. Trachea: No tracheal deviation. Cardiovascular:      Rate and Rhythm: Normal rate and regular rhythm. Heart sounds: Normal heart sounds. No murmur heard. No friction rub. No gallop. Pulmonary:      Effort: Pulmonary effort is normal. No tachypnea, accessory muscle usage or respiratory distress. Breath sounds: Normal breath sounds. No stridor. No decreased breath sounds, wheezing, rhonchi or rales. Abdominal:      Palpations: Abdomen is soft. Musculoskeletal:         General: No deformity. Cervical back: Normal range of motion and neck supple. Right knee: Normal.      Left knee: Bony tenderness and crepitus present. No erythema. Decreased range of motion. Tenderness present over the medial joint line. Right lower leg: No edema. Left lower leg: No edema. Skin:     General: Skin is warm and dry. Coloration: Skin is not pale. Findings: No erythema or rash. Neurological:      Mental Status: He is alert and oriented to person, place, and time. GCS: GCS eye subscore is 4. GCS verbal subscore is 5. GCS motor subscore is 6.       Gait: Gait normal.   Psychiatric:         Speech: Speech normal.         Behavior: Behavior normal.         Thought Content: Thought content normal.         Judgment: Judgment normal.         :         1. Pre-operative clearance        :     1. Pre-operative clearance  -labs and EKG WNL, most recent A1C 6.3 and stable  -plan to hold eliquis x3 days pre-op and ortho will bridge pt post op per Heme recommendations. -PT referral placed for pt to keep appt post op as 12/7 as scheduled     -[facility]- [facility] [place]   Routine, Eval and Treat, Specialty Services Required, [facility] PT, Physical Therapy    Revised Cardiac Risk Index (RCRI)-(one point for each)    . Diabetes requiring insulin  2. Risky surgery (intrathoracic, intraperitoneal,vascular)  3.Cad (including + test, EKG, etc)  4. Chf  5. Cva hx  6. Cr> 2.0    Score - for cardiac mortality  0 = 0.4%  1 = 1.0%  2 = 2.4-6.6%  or more = 5.4-11%    RCRI score is . ..0    0 -> go to OR  1-2 -surgery with beta blocker if itwill change mgmt &/or consider non invasive testing  >:   vascular surgery- consider further testing & beta blocker  Intermediaterisk surgery - to OR with possible beta blocker or consider noninvasive testing         He appears to be medically cleared at this time for surgery and anesthesia.       Electronically signed by ADRI Dillard CNP on 11/29/2022

## 2022-12-07 ENCOUNTER — HOSPITAL ENCOUNTER (OUTPATIENT)
Dept: PHYSICAL THERAPY | Age: 67
Setting detail: THERAPIES SERIES
Discharge: HOME | End: 2022-12-07
Payer: COMMERCIAL

## 2022-12-07 PROCEDURE — 97110 THERAPEUTIC EXERCISES: CPT

## 2022-12-07 PROCEDURE — 97161 PT EVAL LOW COMPLEX 20 MIN: CPT

## 2022-12-07 PROCEDURE — 97016 VASOPNEUMATIC DEVICE THERAPY: CPT

## 2022-12-07 NOTE — THERAPY EVALUATION
[x] [facility]) - [facility] & Therapy  [address]  [place]: [phone]   F: [phone]       Date:  2022  Patient: Nicolás Couch  : 1955  MRN: [id number]  Physician: ADRI Prather - CNP // surgeon: Dr. Myron Garcia - based on [place]  Medical Diagnosis: P70.358 (ICD-10-CM) - Pre-operative clearance  Rehab Codes: R25 pain , M25.60 stiffness, R53.1 weakness, Z96.652 L TKA   Onset Date: 22- L TKA    Next 's appt: 22      PRECAUTIONS: hx of DVT     Subjective:   CC: s/p L TKA  22    Pt arrives with 2WW. He had L TKA 2 days ago (22). He notes he is dealing with high pain levels at 10/10 to \"15\"/10. Pt notes he is taking oxycodone every 6 hours but does not feel it is helping with pain. He notes pain is throbbing and the L knee is swollen. Denies any issues with the dressing. Pt is using the 2WW at home. Is using a cane on the stairs only.      PMHx: [] Unremarkable [x] Diabetes [x] HTN  [] Pacemaker   [] MI/Heart Problems [] Cancer [] Arthritis [x] Other: Hx of DVT                 Past Medical History:   Diagnosis Date    Deep vein thrombosis (HCC)     x 2    Diabetes type 2, controlled ([address]     [place] filter in place     Hyperlipidemia     Hypertension     Obesity     Unspecified sleep apnea       PSH:   Past Surgical History:   Procedure Laterality Date    COLONOSCOPY  14    KNEE ARTHROSCOPY Left     TONSILLECTOMY      VENA CAVA FILTER PLACEMENT      [place] Filter        Comorbidities:   [x] Obesity [] Dialysis  [] Other:   [] Asthma/COPD [] Dementia [] Other:   [] Stroke [] Sleep apnea [] Other:   [] Vascular disease [] Rheumatic disease [] Other:     Preferred Language:   [x] English           [] Other:    Prior Imaging: none present      Previous Treatment: none      Home Environment:   Patient lives with:  SO     In what type of home []  One story   [] Two story   [x] Split level -- trilevel   Number of stairs to enter  1 step           Handrail:    []  Right to enter   [x] Left to enter    Stairs within the home   3 to main level; 10 to bedroom          Handrails: []  Right to ascending  [x] Left to ascending   Bathroom has a []  Tub only  [x] Tub/shower combo   [] Walk in shower    []  Grab bars        has a chair in it. Washing machine is on []  Main level   [] Second level   [] Basement   Hobbies          Medications: [x] Refer to full medical record [] None [] Other:  Allergies:      [x] Refer to full medical record [] None [] Other:      Pain:   Pain present?  yes   Location L knee   Pain Rating currently 10/10   Pain at worse \"15\"/10    Description of pain throbbing   Altered Sensation denies   What makes it worse Movement    What makes it better Ice & medication    Sleep Distrubed        Work Status:  -- 12 weeks, Feb 27th     Prior Level of Function:  IND and working       Functional Status Previous level of function Current level of function Comments   Sitting [x] Independent  [] Deficit [x] Independent  [] Deficit    Standing/walking [x] Independent  [] Deficit [] Independent  [x] Deficit Limited by pain    Driving [x] Independent  [] Deficit [] Independent  [x] Deficit    Housekeeping/Meal Preparation [x] Independent  [] Deficit [] Independent  [x] Deficit Limited by pain    Lifting/Carrying [x] Independent  [] Deficit [] Independent  [x] Deficit Limited by pain    Bending/Reaching [x] Independent  [] Deficit [] Independent  [x] Deficit Limited by pain   Stair climbing [x] Independent  [] Deficit [] Independent  [x] Deficit 1 step at a time now    Pivoting [x] Independent -pain  [] Deficit [] Independent  [x] Deficit Limited by pain    Squatting [x] Independent- pain   [] Deficit [] Independent  [x] Deficit Limited by pain    Other [] Independent  [] Deficit [] Independent  [] Deficit      Patient Goals/Rehab Expectations: to get back to normal      Objective:      Observations:   OBSERVATION No Deficit Deficit Comments   Posture          Forward head []  [x]  Mild postural fwd rounding    Rounded shoulders []  [x]     Slumped sitting  []  [x]     STANDING ALIGNMENT   x Limited weight bearing on LLE; decreased L knee extension    Skin integrity   x Good healing, dry covering    edema  x  Diffuse swelling throughout the L knee   Palpation  x Pain with palpation around the kne   Notable edema   Joint Mobility   x Limited patellar mobility due to swelling    Gait   x With 2WW -- lacking L terminal knee extension in stance, decreased heel strike. Limited knee flexion in swing        Lumbar Clearing:  [x] Not tested            [] No Deficit              [] Deficit:      Sensation:  [x] No Deficit         [] Deficit:     Fall risk:  [] No            [x] Yes:    [place] Fall Risk Assessment     Risk Factor Scale   Score   History of Falls [] Yes  [x] No 25  0 0   Secondary Diagnosis [] Yes  [x] No 15  0 0   Ambulatory Aid [] Furniture  [x] Crutches/cane/walker  [] None/bedrest/wheelchair/nurse 30  15  0 15   IV/Heparin Lock [] Yes  [x] No 20  0 0   Gait/Transferring [] Impaired  [x] Weak  [] Normal/bedrest/immobile 20  10  0 10   Mental Status [] Forgets limitations  [x] Oriented to own ability 15  0 0         Total: 25      Based on the Assessment score: check the appropriate box.       [x]         Use standard prevention interventions           Moderate =     Score of 24-44              [] Give patient handout and discuss fall prevention strategies              [] Establish goal of education for patient/family RE: fall prevention strategies        Range of Motion  Left Range of Motion  Right Strength  Left Strength  Right   Hip Flexion   -2 limited by pain  5   Hip Abduction   3+ 5   Hip Adduction   4 5   Hip Extension   3 5   Knee Flexion 72 125  2 5   Knee Extension - 20 deg 5 deg  Limited quad activation 5   Dorsiflexion   5 5   Plantar flexion   4 5       MUSCLE LENGTH TESTING Normal Left Tight Right Tight Glutes []  [x]  []    Piriformis []  []  []    ITB/TFL []  []  []    Hip Flexors []  [x]  []    Quads []  [x]  []    Hamstrings []  [x]  []    Gastrocnemius []  [x]  []    Soleus []  []  []     []  []  []     []  []  []        Functional Testing:   Sit to stand - not placing full weight on the LLE, keeping LE extended and relying on UE support       Assessment:    Pt presents 2 days post op L TKA. He is limited by edema and pain needing to use a 2WW. His range is very limited in the L knee to 72-20 deg. Extended time spent educating on healing time line and that pain is to be expected. Discussed that focus needs to be on ROM and then will strengthening as tolerated. Limited strength in the LLE which is to be expected. Able to review exercises to perform for home to continue to work on strength and ROM as pain allows. Finished session with vaso for pain and edema control. Overall feel this patient has the potential to progress with PT to allow him to return to near PLOF. Problems:    [x] ? Pain:  [x] ? ROM:  [x] ? Strength:  [x] ? Function:  [x] Other: edema     STG: (to be met in 8 treatments)  Pt will self report worst pain no greater than 4/10 in the L knee upon arrival to PT in order to better tolerate ADLs/work activities with minimal dysfunction  Pt will improve AROM in L knee extension to at least 0 deg to reach terminal stance for ambulation. Pt will improved AROM in L knee flexion to >115 deg to be able to sit to low surfaces. Pt will be able to complete sit to stand with BLE with good mechanics to improve ability to transfer throughout the day. Pt will have minimal swelling in the LLE showing healing and allowing for ROM to improve.     Pt will decrease functional limitation per KOOS JR  to <50% in order to demonstrate improved functional tolerances at PLOF with minimal restriction/dysfunction  LTG: (to be met in 16 treatments)   Pt will increase strength of all major muscle groups of the LEs to 5/5 or greater demonstrating improved strength needed to maximize safety and efficiency with mobility tasks such as gait, transfers and stairs. Pt will be able to ambulate without AD with normalized gait mechanics to allow him to return to PLOF   Pt will decrease functional limitation per KOOS JR  to <40% in order to demonstrate improved functional tolerances at PLOF with minimal restriction/dysfunction  Pt will demonstrate independence with a long term HEP for continued progress/maintenance after completion of PT                   Functional Assessment Used: KOOS JR   Current Status Score: 19 pts = 60% functional limitation  Goal Status Score: < 40%     Evaluation Complexity:  History (Personal factors, comorbidities) [] 0 [x] 1-2 [] 3+   Exam (limitations, restrictions) [x] 1-2 [] 3 [] 4+   Clinical presentation (progression) [x] Stable [] Evolving  [] Unstable   Decision Making [x] Low [] Moderate [] High    [x] Low Complexity [] Moderate Complexity [] High Complexity     Rehab Potential:  [x] Good  [] Fair  [] Poor   Suggested Professional Referral:  [x] No  [] Yes:  Barriers to Goal Achievement[de-identified]  [x] No  [] Yes:  Domestic Concerns:  [x] No  [] Yes:    Pt. Education:  [x] Plans/Goals, Risks/Benefits discussed  [x] Home exercise program  Method of Education: [x] Verbal  [x] Demo  [x] Written  Comprehension of Education:  [x] Verbalizes understanding. [x] Demonstrates understanding. [x] Needs Review. [] Demonstrates/verbalizes understanding of HEP/Ed previously given.     Treatment Plan:  [x] Therapeutic Exercise   96562  [] Iontophoresis: 4 mg/mL Dexamethasone Sodium Phosphate  mAmin  79065   [x] Therapeutic Activity  90267 [x] Vasopneumatic cold with compression  12376    [x] Gait Training   52415 [] Ultrasound   06872   [x] Neuromuscular Re-education  42861 [] Electrical Stimulation Unattended  65513   [x] Manual Therapy  81542 [] Electrical Stimulation Attended  03205   [x] Instruction in HEP [] Lumbar/Cervical Traction  [id number]   [] Aquatic Therapy   [id number] [x] Cold/hotpack    [] Massage   [id number]      [] Dry Needling, 1 or 2 muscles  42122   [x] PT Re-evaluation (if needed)   72435 [] Dry Needling, 3 or more muscles  86111     []  Medication allergies reviewed for use of    Dexamethasone Sodium Phosphate 4mg/ml     with iontophoresis treatments. Pt is not allergic. Frequency: 2-3 x/week for 16 visits    Todays Treatment:    INTERVENTIONS  Reps/ Time Weight/ Level Completed  Today Comments          MODALITIES        Vasocompression- L knee 15 min Low:34 deg x Supine with towel behind knee          MANUAL                      EXERCISES        Glute squeezes* 10x  x    Quad sets* 10x Hold 3s x    Supine hipd ABD assisted* 10x   x    Seated heel slide assisted * 10x Hold 5s  x           EDUCATION        Icing -- 20 mins per hour                     (*) = exercises given as HEP     Specific Instructions for next treatment: nustep, ROM, strengthening as tolerated, work on ambulation      Treatment Charges: Mins Units   [x] Evaluation       [x]  Low       []  Moderate       []  High 26 1   []  Modalities     [x]  Ther Exercise 10 1   []  Manual Therapy     []  Ther Activities     []  Aquatics     []  Neuromuscular     []  Gait Training     []  Dry Needling           1-2 muscles     []  Dry Needling           3 or more muscles     [x] Vasocompression 15 1   []  Other       TOTAL TREATMENT TIME: 51    Time in: 0904    Time Out: 2661    Electronically signed by: Laurie Garcia PT        Physician Signature:________________________________Date:__________________  By signing above or cosigning this note, I have reviewed this plan of care and certify a need for medically necessary rehabilitation services.      *PLEASE SIGN ABOVE AND FAX BACK ALL PAGES*

## 2022-12-08 ASSESSMENT — KOOS JR
TWISING OR PIVOTING ON KNEE: 3
HOW SEVERE IS YOUR KNEE STIFFNESS AFTER FIRST WAKING IN MORNING: 4
RISING FROM SITTING: 2
GOING UP OR DOWN STAIRS: 2
KOOS JR TOTAL INTERVAL SCORE: 39.625
BENDING TO THE FLOOR TO PICK UP OBJECT: 3
STRAIGHTENING KNEE FULLY: 3
STANDING UPRIGHT: 2

## 2022-12-09 ENCOUNTER — HOSPITAL ENCOUNTER (OUTPATIENT)
Dept: PHYSICAL THERAPY | Age: 67
Setting detail: THERAPIES SERIES
Discharge: HOME | End: 2022-12-09
Payer: COMMERCIAL

## 2022-12-09 PROCEDURE — 97110 THERAPEUTIC EXERCISES: CPT

## 2022-12-09 PROCEDURE — 97016 VASOPNEUMATIC DEVICE THERAPY: CPT

## 2022-12-09 NOTE — FLOWSHEET NOTE
[address] [facility]   [address]   Phone: [phone]   Fax: [phone]    Physical Therapy Daily Treatment Note      Date:  2022  Patient Name:  Mary Mosqueda    :  1955  MRN: [id number]  Physician: ADRI Odell - CNP // surgeon: Dr. Landin Do: AnnamarieJenny Carroll [address] - based on [place]  Medical Diagnosis: M73.140 (ICD-10-CM) - Pre-operative clearance  Rehab Codes: R25 pain , M25.60 stiffness, R53.1 weakness, Z96.652 L TKA   Onset Date: 22- L TKA              Next 's appt: 22  Visit# / total visits:   Cancels/No Shows: 0/1    Precautions: hx of DVT        Subjective:  Pt notes he was sore after eval. Did not do much at home due to pain. Pt still taking pain medication and using 2WW        Pain:  [x] Yes  [] No Location: L knee Pain Rating: (0-10 scale) 7/10  Pain altered Tx:  [] No  [] Yes  Action:  Comments:    Objective:  INTERVENTIONS  INTERVENTIONS  Reps/ Time Weight/ Level Completed  Today Comments          MODALITIES        vasocompression 10 min Low; 34 deg x Supine with towel behind knee          MANUAL                      EXERCISES        Nustep  5 min Lvl 1  Seat 14 x LE only           Supine        HS stretch with strap 3x30s   x    Gastroc stretch with strap  3x30s  x    SAQ- assisted 2x10 Hold 3s  x Mod A    Modified glute bridge  2x15  x With bolster under thighs    Assisted SLR 2x10  x Quad set and lift with mod A    Heel slides    ADD    Knee tuck in with ball   ADD                                        Other:  : educated on the importance to move the LLE as much as tolerated. Specific Instructions for next treatment: nustep, ROM (add heel slides), strengthening as tolerated, work on ambulation        Assessment: [x] Progressing toward goals. Began with nustep to increase ROM of the L knee. Still very limited by pain and edema.  Limited quad activation needing assist for SAQ and supine SLR. Lacking still full knee extension and limited to flexion <90 deg. Will need to continue to progress. Pt has mild increase in pain with session. Ended with vaso for pain and edema control. [] No change. [] Other:    [x] Patient would continue to benefit from skilled physical therapy services in order to: increase ROM, improve strength, control edema, and progress functional mobility to return to near PLOF. GOALS:   STG: (to be met in 8 treatments)  Pt will self report worst pain no greater than 4/10 in the L knee upon arrival to PT in order to better tolerate ADLs/work activities with minimal dysfunction  Pt will improve AROM in L knee extension to at least 0 deg to reach terminal stance for ambulation. Pt will improved AROM in L knee flexion to >115 deg to be able to sit to low surfaces. Pt will be able to complete sit to stand with BLE with good mechanics to improve ability to transfer throughout the day. Pt will have minimal swelling in the LLE showing healing and allowing for ROM to improve. Pt will decrease functional limitation per KOOS JR  to <50% in order to demonstrate improved functional tolerances at PLOF with minimal restriction/dysfunction  LTG: (to be met in 16 treatments)   Pt will increase strength of all major muscle groups of the LEs to 5/5 or greater demonstrating improved strength needed to maximize safety and efficiency with mobility tasks such as gait, transfers and stairs. Pt will be able to ambulate without AD with normalized gait mechanics to allow him to return to PLOF   Pt will decrease functional limitation per KOOS JR  to <40% in order to demonstrate improved functional tolerances at PLOF with minimal restriction/dysfunction  Pt will demonstrate independence with a long term HEP for continued progress/maintenance after completion of PT     Pt.  Education:  [x] Yes  [] No  [x] Reviewed Prior HEP/Ed  Method of Education: [] Verbal  [] Demo  [] Written  Comprehension of Education:  [x] Verbalizes understanding. [] Demonstrates understanding. [] Needs review. [x] Demonstrates/verbalizes HEP/Ed previously given. Home exercise Program   EXERCISES        Glute squeezes* 10x3     Quad sets* 10x3 Hold 3s   Supine hipd ABD assisted* 10x 3     Seated heel slide assisted * 10x3 Hold 5s         Plan: [x] Continue per plan of care.    [] Other:      Treatment Charges: Mins Units   []  Modalities     [x]  Ther Exercise 33 2   []  Manual Therapy     []  Ther Activities     []  Aquatics     []  Neuromuscular     [x] Vasocompression 10 1   [] Gait Training     [] Dry needling        [] 1 or 2 muscles        [] 3 or more muscles     []  Other     Total Treatment time 43 3     Time In:0802            Time Out: 1097    Electronically signed by:  Laurie Garcia PT

## 2022-12-12 ENCOUNTER — HOSPITAL ENCOUNTER (OUTPATIENT)
Dept: PHYSICAL THERAPY | Age: 67
Setting detail: THERAPIES SERIES
Discharge: HOME | End: 2022-12-12
Payer: COMMERCIAL

## 2022-12-12 PROCEDURE — 97016 VASOPNEUMATIC DEVICE THERAPY: CPT

## 2022-12-12 PROCEDURE — 97110 THERAPEUTIC EXERCISES: CPT

## 2022-12-12 NOTE — FLOWSHEET NOTE
[address] [facility] Physical Therapy   [address]   Phone: [phone]   Fax: [phone]    Physical Therapy Daily Treatment Note      Date:  2022  Patient Name:  Aster Jones    :  1955  MRN: [id number]  Physician: ADRI Key - CNP // surgeon: Dr. Moises Arce: [address] - based on [place]  Medical Diagnosis: C91.495 (ICD-10-CM) - Pre-operative clearance  Rehab Codes: R25 pain , M25.60 stiffness, R53.1 weakness, Z96.652 L TKA   Onset Date: 22- L TKA              Next 's appt: 22  Visit# / total visits: 3/16  Cancels/No Shows: 0/1    Precautions: hx of DVT        Subjective:  Patient reports today that pain level continued to be high. Reported that he has been working on keeping knee straight at home and that he is trying to wean pain meds. Pain:  [x] Yes  [] No Location: L knee Pain Rating: (0-10 scale) 7/10  Pain altered Tx:  [] No  [] Yes  Action:  Comments:    Objective:  INTERVENTIONS  INTERVENTIONS  Reps/ Time Weight/ Level Completed  Today Comments          MODALITIES        vasocompression 15 min Low; 34 deg x Supine with towel behind knee          MANUAL                      EXERCISES        Nustep  5 min Lvl 1  Seat 14 x LE only           Supine        HS stretch with strap 3x30s   x    Gastroc stretch with strap  3x30s  x    SAQ- assisted 2x10 Hold 3s   Mod A    Modified glute bridge  2x15  x With bolster under thighs    Assisted SLR 2x10  x Quad set and lift with mod A    Heel slides with Strap 15x  x    Knee tuck in with ball 10x Red pball x    Quad Sets with Towel Roll Under Heel 10x2, 3\" hold  x                                Other:  : educated on the importance to move the LLE as much as tolerated. Specific Instructions for next treatment: nustep, ROM (add heel slides), strengthening as tolerated, work on ambulation        Assessment: [x] Progressing toward goals. Continued to work on LLE strength and ROM per pt tolerance. Most active motions for pt continued to be very painful, although able to tolerate treatment today with adequate rest. Additional exercises performed today to improve L knee ROM. Vaso for pain and edema management following exercises. [] No change. [] Other:    [x] Patient would continue to benefit from skilled physical therapy services in order to: increase ROM, improve strength, control edema, and progress functional mobility to return to near PLOF. GOALS:   STG: (to be met in 8 treatments)  Pt will self report worst pain no greater than 4/10 in the L knee upon arrival to PT in order to better tolerate ADLs/work activities with minimal dysfunction  Pt will improve AROM in L knee extension to at least 0 deg to reach terminal stance for ambulation. Pt will improved AROM in L knee flexion to >115 deg to be able to sit to low surfaces. Pt will be able to complete sit to stand with BLE with good mechanics to improve ability to transfer throughout the day. Pt will have minimal swelling in the LLE showing healing and allowing for ROM to improve. Pt will decrease functional limitation per KOOS JR  to <50% in order to demonstrate improved functional tolerances at PLOF with minimal restriction/dysfunction  LTG: (to be met in 16 treatments)   Pt will increase strength of all major muscle groups of the LEs to 5/5 or greater demonstrating improved strength needed to maximize safety and efficiency with mobility tasks such as gait, transfers and stairs. Pt will be able to ambulate without AD with normalized gait mechanics to allow him to return to PLOF   Pt will decrease functional limitation per KOOS JR  to <40% in order to demonstrate improved functional tolerances at PLOF with minimal restriction/dysfunction  Pt will demonstrate independence with a long term HEP for continued progress/maintenance after completion of PT     Pt.  Education:  [x] Yes  [] No  [x] Reviewed Prior HEP/Ed  Method of Education: [x] Verbal  [] Demo  [] Written  Comprehension of Education:  [x] Verbalizes understanding. [] Demonstrates understanding. [] Needs review. [x] Demonstrates/verbalizes HEP/Ed previously given. Home exercise Program   EXERCISES        Glute squeezes* 10x3     Quad sets* 10x3 Hold 3s   Supine hipd ABD assisted* 10x 3     Seated heel slide assisted * 10x3 Hold 5s         Plan: [x] Continue per plan of care.    [] Other:      Treatment Charges: Mins Units   []  Modalities     [x]  Ther Exercise 47 3   []  Manual Therapy     []  Ther Activities     []  Aquatics     []  Neuromuscular     [x] Vasocompression 15 1   [] Gait Training     [] Dry needling        [] 1 or 2 muscles        [] 3 or more muscles     []  Other     Total Treatment time 62 4     Time In: 11:20 am            Time Out: 12:22 pm    Electronically signed by:  Rahat Orona PTA

## 2022-12-14 ENCOUNTER — HOSPITAL ENCOUNTER (OUTPATIENT)
Dept: PHYSICAL THERAPY | Age: 67
Setting detail: THERAPIES SERIES
Discharge: HOME | End: 2022-12-14
Payer: COMMERCIAL

## 2022-12-14 PROCEDURE — 97016 VASOPNEUMATIC DEVICE THERAPY: CPT

## 2022-12-14 PROCEDURE — 97110 THERAPEUTIC EXERCISES: CPT

## 2022-12-14 NOTE — FLOWSHEET NOTE
Progressing toward goals. 12/14: Continued with most recent progressions in order to address mobility and strength deficits. Patient continues to required moderate assist with SLR and SAQ this date. Added OP at end range flexion and extension with heel slides in order to promote better range. Continue with vaso compression for pain and edema control but patient limited to 10 minutes this date due to personal time restraints. [] No change. [] Other:    [x] Patient would continue to benefit from skilled physical therapy services in order to: increase ROM, improve strength, control edema, and progress functional mobility to return to near PLOF. GOALS:   STG: (to be met in 8 treatments)  Pt will self report worst pain no greater than 4/10 in the L knee upon arrival to PT in order to better tolerate ADLs/work activities with minimal dysfunction  Pt will improve AROM in L knee extension to at least 0 deg to reach terminal stance for ambulation. Pt will improved AROM in L knee flexion to >115 deg to be able to sit to low surfaces. Pt will be able to complete sit to stand with BLE with good mechanics to improve ability to transfer throughout the day. Pt will have minimal swelling in the LLE showing healing and allowing for ROM to improve. Pt will decrease functional limitation per KOOS JR  to <50% in order to demonstrate improved functional tolerances at PLOF with minimal restriction/dysfunction  LTG: (to be met in 16 treatments)   Pt will increase strength of all major muscle groups of the LEs to 5/5 or greater demonstrating improved strength needed to maximize safety and efficiency with mobility tasks such as gait, transfers and stairs.     Pt will be able to ambulate without AD with normalized gait mechanics to allow him to return to PLOF   Pt will decrease functional limitation per KOOS JR  to <40% in order to demonstrate improved functional tolerances at PLOF with minimal restriction/dysfunction  Pt will demonstrate independence with a long term HEP for continued progress/maintenance after completion of PT     Pt. Education:  [x] Yes  [] No  [x] Reviewed Prior HEP/Ed  Method of Education: [x] Verbal  [] Demo  [] Written  Comprehension of Education:  [x] Verbalizes understanding. [] Demonstrates understanding. [] Needs review. [x] Demonstrates/verbalizes HEP/Ed previously given. Home exercise Program   EXERCISES        Glute squeezes* 10x3     Quad sets* 10x3 Hold 3s   Supine hipd ABD assisted* 10x 3     Seated heel slide assisted * 10x3 Hold 5s         Plan: [x] Continue per plan of care.    [] Other:      Treatment Charges: Mins Units   []  Modalities     [x]  Ther Exercise 44 3   []  Manual Therapy     []  Ther Activities     []  Aquatics     []  Neuromuscular     [x] Vasocompression 10 1   [] Gait Training     [] Dry needling        [] 1 or 2 muscles        [] 3 or more muscles     []  Other     Total Treatment time 54 4     Time In: 9:30 am            Time Out: 10:24 am     Electronically signed by:  Ema Warner PTA

## 2022-12-16 ENCOUNTER — HOSPITAL ENCOUNTER (OUTPATIENT)
Dept: PHYSICAL THERAPY | Age: 67
Setting detail: THERAPIES SERIES
Discharge: HOME | End: 2022-12-16
Payer: COMMERCIAL

## 2022-12-16 PROCEDURE — 97016 VASOPNEUMATIC DEVICE THERAPY: CPT

## 2022-12-16 PROCEDURE — 97110 THERAPEUTIC EXERCISES: CPT

## 2022-12-16 NOTE — FLOWSHEET NOTE
[facility] Therapy   [address] [place] [address] [address]   Phone: [phone]   Fax: [phone]    Physical Therapy Daily Treatment Note      Date:  2022  Patient Name:  Ny Angel    :  1955  MRN: [id number]  Physician: Sola Cortés, APRN - CNP // surgeon: Dr. Charleston Kanner: Deidre Charlton - based on [place]  Medical Diagnosis: G23.565 (ICD-10-CM) - Pre-operative clearance  Rehab Codes: R25 pain , M25.60 stiffness, R53.1 weakness, Z96.652 L TKA   Onset Date: 22- L TKA              Next 's appt: 22  Visit# / total visits:   Cancels/No Shows: 0/0    Precautions: hx of DVT        Subjective:  Pt arrives with 2WW. He notes he was sore after the last session but does feel improvements in his knee ROM.      Pain:  [x] Yes  [] No Location: L knee Pain Rating: (0-10 scale) 7/10  Pain altered Tx:  [] No  [] Yes  Action:  Comments:    Objective:  INTERVENTIONS  INTERVENTIONS  Reps/ Time Weight/ Level Completed  Today Comments          MODALITIES        vasocompression 10 min Low; 40 deg x Supine with towel behind knee          MANUAL                      EXERCISES        Nustep  5 min Lvl 2  Seat 11 x LE only           Supine        HS stretch with strap 3x30s   x    Gastroc stretch with strap  3x30s  x    SAQ- assisted 2x10 Hold 3s  x Yoseph -- much improved activation   Tactile cues toward quad insertion with better activation      glute bridge  2x10  x    Assisted SLR 2x10  x Quad set and lift with min A    Heel slides with OP 10x  2 sets  x   OP from therapist 3\" hold at end range each time   Inc sets    Knee tuck in with ball 10x Red pball     Quad Sets with Towel Roll Under Heel 10x2, 3\" hold  x With therapist over pressure    Supine hip abd  2x15 red x                         Other:  L KNEE ROM  Knee flexion  Knee extension      72 -20    102 -8                 : educated on the importance to move the LLE as much as tolerated. Specific Instructions for next treatment: nustep, ROM (add heel slides), strengthening as tolerated, work on ambulation        Assessment: [x] Progressing toward goals. Began with nustep at a closer range for greater knee flexion. Continued with working on ROM. Still lacking in knee extension & flexion, but significant improvements since eval. Pt better able to activate quad and hip flexors only needing Yoseph to complete motion. Does best with tactile cues. Additionally progressed bridges and to hook lying hip ABD as knee flexion is improved. Continued with vaso post at a higher temp for comfort . [] No change. [] Other:    [x] Patient would continue to benefit from skilled physical therapy services in order to: increase ROM, improve strength, control edema, and progress functional mobility to return to near PLOF. GOALS:   STG: (to be met in 8 treatments)  Pt will self report worst pain no greater than 4/10 in the L knee upon arrival to PT in order to better tolerate ADLs/work activities with minimal dysfunction  Pt will improve AROM in L knee extension to at least 0 deg to reach terminal stance for ambulation. Pt will improved AROM in L knee flexion to >115 deg to be able to sit to low surfaces. Pt will be able to complete sit to stand with BLE with good mechanics to improve ability to transfer throughout the day. Pt will have minimal swelling in the LLE showing healing and allowing for ROM to improve. Pt will decrease functional limitation per KOOS JR  to <50% in order to demonstrate improved functional tolerances at PLOF with minimal restriction/dysfunction  LTG: (to be met in 16 treatments)   Pt will increase strength of all major muscle groups of the LEs to 5/5 or greater demonstrating improved strength needed to maximize safety and efficiency with mobility tasks such as gait, transfers and stairs.     Pt will be able to ambulate without AD with normalized gait mechanics to

## 2022-12-19 ENCOUNTER — HOSPITAL ENCOUNTER (OUTPATIENT)
Dept: PHYSICAL THERAPY | Age: 67
Setting detail: THERAPIES SERIES
Discharge: HOME | End: 2022-12-19
Payer: COMMERCIAL

## 2022-12-19 PROCEDURE — 97110 THERAPEUTIC EXERCISES: CPT

## 2022-12-19 PROCEDURE — 97016 VASOPNEUMATIC DEVICE THERAPY: CPT

## 2022-12-19 NOTE — PROGRESS NOTES
[address] [facility]   [address] [facility] [address]   Phone: [phone]   Fax: [phone]    Physical Therapy Daily Treatment Note      Date:  2022  Patient Name:  Ata Houser    :  1955  MRN: [id number]  Physician: ADRI Mckeon - CNP // surgeon: Dr. Philippe Kennedy: Otis Monroe - based on [place]  Medical Diagnosis: Z99.336 (ICD-10-CM) - Pre-operative clearance  Rehab Codes: R25 pain , M25.60 stiffness, R53.1 weakness, Z96.652 L TKA   Onset Date: 22- L TKA              Next 's appt: 22  Visit# / total visits:   Cancels/No Shows: 0/0  Date of initial visit: 22        Date of PN: 22 (visit 6)  Formal progress note reporting period:  22 - 22     Precautions: hx of DVT        Subjective:  Pt arrives with 2WW. He notes he was sore after the last session. Notes pain is increases this morning but he took a pain pill prior to this treatment.      Pain:  [x] Yes  [] No Location: L knee Pain Rating: (0-10 scale) 7/10  Pain altered Tx:  [] No  [] Yes  Action:  Comments:    Objective:  INTERVENTIONS  INTERVENTIONS  Reps/ Time Weight/ Level Completed  Today Comments          MODALITIES        vasocompression 10 min Low; 36 deg x Supine with towel behind knee          MANUAL                      EXERCISES        Nustep  5 min Lvl 3  Seat 11 x LE only           Supine        HS stretch with strap 3x30s   x    Gastroc stretch with strap  3x30s  x    SAQ 2x10 0-1# x Tactile cues only, no assist needed   Inc weight second set     glute bridge  2x10  x     SLR 2x10  x Initially with Yoseph to no assist   Still mild quad lag    Heel slides with OP 10x  x OP from therapist 5\" hold at end range each time      Knee tuck in with ball 10x Red pball     Quad Sets with Towel Roll Under Heel 5x15s  x With therapist over pressure    hooklying hip abd  2x15 red x           Standing       TKE 15x red x    Step ups: fwd & lateral 10x ea 3 in x L ONLY    Other:  L KNEE ROM  Knee flexion  Knee extension    12/7  72 -20   12/16 102 -8   12/19 103 -3           taken 12/19 Strength  Left Strength  Right   Hip Flexion 3  5   Hip Abduction 4- 5   Hip Adduction 5 5   Hip Extension 4- 5   Knee Flexion 3+ 5   Knee Extension 3 5   Dorsiflexion 5 5   Plantar flexion 4 5       12/9: educated on the importance to move the LLE as much as tolerated. Specific Instructions for next treatment: nustep, ROM, add manual to increase knee flexion and extension, strengthening as tolerated (try more in standing, work on ambulation        Assessment: [x] Progressing toward goals. Pt has now completed x6 visits in POC and is now 2 week post op. He is progressing well but still relying on a 2WW for ambulation due to pain. ROM is progressively increasing now 3-103 total ROM. Strength is progressing as well too. Been delayed in getting quad activation but much improvement in the last couple of sessions. Have continued focusing on ROM & strength with program. Progressing in resistances and doing more in standing this session. Overall feel this pt is progressing well towards all goals but still dealing with pain, limited ROM, and decreased strength. Feel it is appropriate to continue per this POC. Will continue to update and assess. [] No change. [] Other:    [x] Patient would continue to benefit from skilled physical therapy services in order to: increase ROM, improve strength, control edema, and progress functional mobility to return to near PLOF. GOALS: -assessed 12/19/22  STG: (to be met in 8 treatments)  Pt will self report worst pain no greater than 4/10 in the L knee upon arrival to PT in order to better tolerate ADLs/work activities with minimal dysfunction   12/19: progressing -- still moderate pain   Pt will improve AROM in L knee extension to at least 0 deg to reach terminal stance for ambulation.     12/19: progressing -- -3 deg from full extension   Pt will improved AROM in L knee flexion to >115 deg to be able to sit to low surfaces. 12/19: progressing -- 103 deg knee flexion  Pt will be able to complete sit to stand with BLE with good mechanics to improve ability to transfer throughout the day. 12/19: MET   Pt will have minimal swelling in the LLE showing healing and allowing for ROM to improve. 12/19: progressing   Pt will decrease functional limitation per KOOS JR  to <50% in order to demonstrate improved functional tolerances at PLOF with minimal restriction/dysfunction   12/19: will assess in later session. LTG: (to be met in 16 treatments)   Pt will increase strength of all major muscle groups of the LEs to 5/5 or greater demonstrating improved strength needed to maximize safety and efficiency with mobility tasks such as gait, transfers and stairs. Pt will be able to ambulate without AD with normalized gait mechanics to allow him to return to PLOF   Pt will decrease functional limitation per KOOS JR  to <40% in order to demonstrate improved functional tolerances at PLOF with minimal restriction/dysfunction  Pt will demonstrate independence with a long term HEP for continued progress/maintenance after completion of PT     Pt. Education:  [x] Yes  [] No  [x] Reviewed Prior HEP/Ed  Method of Education: [x] Verbal  [] Demo  [] Written  Comprehension of Education:  [x] Verbalizes understanding. [] Demonstrates understanding. [] Needs review. [x] Demonstrates/verbalizes HEP/Ed previously given. Home exercise Program   EXERCISES        Glute squeezes* 10x3     Quad sets* 10x3 Hold 3s   Supine hipd ABD assisted* 10x 3     Seated heel slide assisted * 10x3 Hold 5s         Plan: [] Continue per plan of care.    [x] Other:  see below     Treatment Plan:  [x] Therapeutic Exercise   20068  [] Iontophoresis: 4 mg/mL Dexamethasone Sodium Phosphate  mAmin  20201   [x] Therapeutic Activity  99603 [x] Vasopneumatic cold with compression  [id number]    [x] Gait Training   [phone] [] Ultrasound   [id number]   [x] Neuromuscular Re-education  [id number] [] Electrical Stimulation Unattended  01344   [x] Manual Therapy  32982 [] Electrical Stimulation Attended  [id number]   [x] Instruction in HEP  [] Lumbar/Cervical Traction  [id number]   [] Aquatic Therapy   [id number] [x] Cold/hotpack    [] Massage   29684      [] Dry Needling, 1 or 2 muscles  82403   [] Biofeedback, first 15 minutes   02337  [] Biofeedback, additional 15 minutes   92372 [] Dry Needling, 3 or more muscles  73508      Patient Status:     [x] Continue per initial plan of care. [] Additional visits necessary. [] Other:     Requested Frequency/Duration: 2-3 times per week for 16 treatments.                  Treatment Charges: Mins Units   []  Modalities     [x]  Ther Exercise 44 3   []  Manual Therapy     []  Ther Activities     []  Aquatics     []  Neuromuscular     [x] Vasocompression 10 1   [] Gait Training     [] Dry needling        [] 1 or 2 muscles        [] 3 or more muscles     []  Other     Total Treatment time 54 4     Time In: 10:42 am            Time Out:  11:36 am     Electronically signed by:  Jaren Mei PT

## 2022-12-21 ENCOUNTER — HOSPITAL ENCOUNTER (OUTPATIENT)
Dept: PHYSICAL THERAPY | Age: 67
Setting detail: THERAPIES SERIES
Discharge: HOME | End: 2022-12-21
Payer: COMMERCIAL

## 2022-12-21 PROCEDURE — 97110 THERAPEUTIC EXERCISES: CPT

## 2022-12-21 PROCEDURE — 97016 VASOPNEUMATIC DEVICE THERAPY: CPT

## 2022-12-21 NOTE — FLOWSHEET NOTE
[address] [facility]   [address] [facility] [address]   Phone: [phone]   Fax: [phone]    Physical Therapy Daily Treatment Note      Date:  2022  Patient Name:  Shahla Soriano    :  1955  MRN: [id number]  Physician: Keshia Norton, APRN - CNP // surgeon: Dr. Amy Jensen: Pete Dumont [address] - based on [place]  Medical Diagnosis: Z01.818 (ICD-10-CM) - Pre-operative clearance  Rehab Codes: R25 pain , M25.60 stiffness, R53.1 weakness, Z96.652 L TKA   Onset Date: 22- L TKA              Next 's appt: ?  Visit# / total visits:   Cancels/No Shows: 0/0  Date of initial visit: 22        Date of PN: 22 (visit 6)  Formal progress note reporting period:  22 - 22     Precautions: hx of DVT        Subjective:    Patient reprots today that L knee feels pretty well overall. Reported that CNP he saw at 2 week follow up appt yesterday didn't have much to say. Reported he continues to have fairly high pain level in L knee, although overall function is steadily improving.     Pain:  [x] Yes  [] No Location: L knee Pain Rating: (0-10 scale) 7/10  Pain altered Tx:  [] No  [] Yes  Action:  Comments:    Objective:  INTERVENTIONS  INTERVENTIONS  Reps/ Time Weight/ Level Completed  Today Comments          MODALITIES        vasocompression 10 min Low; 36 deg x Supine with towel behind knee          MANUAL                      EXERCISES        Nustep  5 min Lvl 3  Seat 11 x LE only           Supine        HS stretch with strap 3x30s   x    Gastroc stretch with strap  3x30s  x    SAQ 2x10 0-1#  Tactile cues only, no assist needed   Inc weight second set     glute bridge  2x10  x     SLR 2x10  x Initially with Yoseph to no assist   Still mild quad lag    Heel slides with OP 10x  x OP from therapist 5\" hold at end range each time      Knee tuck in with ball 10x Red pball     Quad Sets with Towel Roll Under Heel 5x15s  x With therapist over pressure hooklying hip abd  2x15 red            Standing       TKE 15x red x    Step ups: fwd & lateral 10x ea 3 in x L ONLY    Other:  L KNEE ROM  Knee flexion  Knee extension    12/7  72 -20   12/16 102 -8   12/19 103 -3           taken 12/19 Strength  Left Strength  Right   Hip Flexion 3  5   Hip Abduction 4- 5   Hip Adduction 5 5   Hip Extension 4- 5   Knee Flexion 3+ 5   Knee Extension 3 5   Dorsiflexion 5 5   Plantar flexion 4 5       Specific Instructions for next treatment: nustep, ROM, add manual to increase knee flexion and extension, strengthening as tolerated (try more in standing, work on ambulation        Assessment: [x] Progressing toward goals. Continued to work on LLE strength and ROM with charted exercises. Patient continues to demonstrate mile quad lag with SLR, although able to perform reps without. Cues provided with SLR to encourage quad set prior to active motion to reduce quad lag and promote knee extension ROM. Withheld SAQ and hooklying hip abduction secondary to time constraints. Vaso for pain and edema management following exercises. [] No change. [] Other:    [x] Patient would continue to benefit from skilled physical therapy services in order to: increase ROM, improve strength, control edema, and progress functional mobility to return to near PLOF. GOALS: -assessed 12/19/22  STG: (to be met in 8 treatments)  Pt will self report worst pain no greater than 4/10 in the L knee upon arrival to PT in order to better tolerate ADLs/work activities with minimal dysfunction   12/19: progressing -- still moderate pain   Pt will improve AROM in L knee extension to at least 0 deg to reach terminal stance for ambulation. 12/19: progressing -- -3 deg from full extension   Pt will improved AROM in L knee flexion to >115 deg to be able to sit to low surfaces.     12/19: progressing -- 103 deg knee flexion  Pt will be able to complete sit to stand with BLE with good mechanics to improve ability to transfer throughout the day. 12/19: MET   Pt will have minimal swelling in the LLE showing healing and allowing for ROM to improve. 12/19: progressing   Pt will decrease functional limitation per KOOS JR  to <50% in order to demonstrate improved functional tolerances at PLOF with minimal restriction/dysfunction   12/19: will assess in later session. LTG: (to be met in 16 treatments)   Pt will increase strength of all major muscle groups of the LEs to 5/5 or greater demonstrating improved strength needed to maximize safety and efficiency with mobility tasks such as gait, transfers and stairs. Pt will be able to ambulate without AD with normalized gait mechanics to allow him to return to PLOF   Pt will decrease functional limitation per KOOS JR  to <40% in order to demonstrate improved functional tolerances at PLOF with minimal restriction/dysfunction  Pt will demonstrate independence with a long term HEP for continued progress/maintenance after completion of PT     Pt. Education:  [x] Yes  [] No  [x] Reviewed Prior HEP/Ed  Method of Education: [x] Verbal  [] Demo  [] Written  Comprehension of Education:  [x] Verbalizes understanding. [] Demonstrates understanding. [] Needs review. [x] Demonstrates/verbalizes HEP/Ed previously given. Home exercise Program   EXERCISES        Glute squeezes* 10x3     Quad sets* 10x3 Hold 3s   Supine hipd ABD assisted* 10x 3     Seated heel slide assisted * 10x3 Hold 5s         Plan: [x] Continue per plan of care.    [] Other:        Treatment Charges: Mins Units   []  Modalities     [x]  Ther Exercise 44 3   []  Manual Therapy     []  Ther Activities     []  Aquatics     []  Neuromuscular     [x] Vasocompression 10 1   [] Gait Training     [] Dry needling        [] 1 or 2 muscles        [] 3 or more muscles     []  Other     Total Treatment time 54 4     Time In: 8:51 am            Time Out: 9:45 am      Electronically signed by:  Ilsa Mott PTA

## 2022-12-23 ENCOUNTER — HOSPITAL ENCOUNTER (OUTPATIENT)
Dept: PHYSICAL THERAPY | Age: 67
Setting detail: THERAPIES SERIES
Discharge: HOME | End: 2022-12-23
Payer: COMMERCIAL

## 2022-12-23 PROCEDURE — 97110 THERAPEUTIC EXERCISES: CPT

## 2022-12-23 NOTE — FLOWSHEET NOTE
[facility] Out[facility] Physical Therapy   [address]   Phone: [phone]   Fax: [phone]    Physical Therapy Daily Treatment Note      Date:  2022  Patient Name:  Virginia Wong    :  1955  MRN: [id number]  Physician: ADRI Craven - CNP // surgeon: Dr. Ananya De Anda: Willy Ashford - based on [place]  Medical Diagnosis: Z01.818 (ICD-10-CM) - Pre-operative clearance  Rehab Codes: R25 pain , M25.60 stiffness, R53.1 weakness, Z96.652 L TKA   Onset Date: 22- L TKA              Next 's appt: ?  Visit# / total visits:   Cancels/No Shows: 0/0  Date of initial visit: 22        Date of PN: 22 (visit 6)      Precautions: hx of DVT        Subjective:    Patient reprots today that L knee feels pretty well overal but still moderate pain.         Pain:  [x] Yes  [] No Location: L knee Pain Rating: (0-10 scale) 7/10  Pain altered Tx:  [] No  [] Yes  Action:  Comments:    Objective:  INTERVENTIONS  INTERVENTIONS  Reps/ Time Weight/ Level Completed  Today Comments          MODALITIES        vasocompression 10 min Low; 36 deg x Supine with towel behind knee          MANUAL        ISTM - roller stick  5 min   x L quad   Patellar mobs- all directions 3 min              EXERCISES        Nustep  5 min Lvl 4  Seat 11 x LE only           Supine        HS stretch with strap 2x30s   x    Gastroc stretch with strap  3x30s      SAQ 2x10 0-1#  Tactile cues only, no assist needed   Inc weight second set     glute bridge  20x  x     SLR 2x10   Initially with Yoseph to no assist   Still mild quad lag    Heel slides with OP 15x  x OP from therapist 5\" hold at end range each time      Quad Sets with Towel Roll Under Heel 5x15s  x With therapist over pressure (-3 deg)   hooklying hip abd  2x15 red x           TG DL squat  20x Height 10 x                  Standing       TKE 20x red x Cues for posture    Step ups: fwd & lateral 10x2 ea 6 in x L ONLY    4 way hip  10x ea  Red  x                                  Other:  L KNEE ROM  Knee flexion  Knee extension    12/7  72 -20   12/16 102 -8   12/19 103 -3            Specific Instructions for next treatment: nustep, ROM, add manual to increase knee flexion and extension, strengthening as tolerated (try more in standing, work on ambulation        Assessment: [x] Progressing toward goals. Continued to focus on knee ROM. Showing improvements in all ranges. Much improved quad activation. Still having moderate pain levels but able to progress. Worked on more standing for functional strength with pt having good tolerance. Pt denies need for ice post session. Discussed icing in the posterior knee. [] No change. [] Other:    [x] Patient would continue to benefit from skilled physical therapy services in order to: increase ROM, improve strength, control edema, and progress functional mobility to return to near PLOF. GOALS: -assessed 12/19/22  STG: (to be met in 8 treatments)  Pt will self report worst pain no greater than 4/10 in the L knee upon arrival to PT in order to better tolerate ADLs/work activities with minimal dysfunction   12/19: progressing -- still moderate pain   Pt will improve AROM in L knee extension to at least 0 deg to reach terminal stance for ambulation. 12/19: progressing -- -3 deg from full extension   Pt will improved AROM in L knee flexion to >115 deg to be able to sit to low surfaces. 12/19: progressing -- 103 deg knee flexion  Pt will be able to complete sit to stand with BLE with good mechanics to improve ability to transfer throughout the day. 12/19: MET   Pt will have minimal swelling in the LLE showing healing and allowing for ROM to improve. 12/19: progressing   Pt will decrease functional limitation per KOOS JR  to <50% in order to demonstrate improved functional tolerances at PLOF with minimal restriction/dysfunction   12/19: will assess in later session.    LTG: (to be met in 16 treatments)   Pt will increase strength of all major muscle groups of the LEs to 5/5 or greater demonstrating improved strength needed to maximize safety and efficiency with mobility tasks such as gait, transfers and stairs. Pt will be able to ambulate without AD with normalized gait mechanics to allow him to return to PLOF   Pt will decrease functional limitation per KOOS JR  to <40% in order to demonstrate improved functional tolerances at PLOF with minimal restriction/dysfunction  Pt will demonstrate independence with a long term HEP for continued progress/maintenance after completion of PT     Pt. Education:  [x] Yes  [] No  [x] Reviewed Prior HEP/Ed  Method of Education: [x] Verbal  [] Demo  [] Written  Comprehension of Education:  [x] Verbalizes understanding. [] Demonstrates understanding. [] Needs review. [x] Demonstrates/verbalizes HEP/Ed previously given. Home exercise Program   EXERCISES        Glute squeezes* 10x3     Quad sets* 10x3 Hold 3s   Supine hipd ABD assisted* 10x 3     Seated heel slide assisted * 10x3 Hold 5s         Plan: [x] Continue per plan of care.    [] Other:        Treatment Charges: Mins Units   []  Modalities     [x]  Ther Exercise 41 3   []  Manual Therapy     []  Ther Activities     []  Aquatics     []  Neuromuscular     [] Vasocompression     [] Gait Training     [] Dry needling        [] 1 or 2 muscles        [] 3 or more muscles     []  Other     Total Treatment time 41 3     Time In: 8:13 am            Time Out: 0278 am      Electronically signed by:  Brii Colon PT

## 2022-12-27 ENCOUNTER — HOSPITAL ENCOUNTER (OUTPATIENT)
Dept: PHYSICAL THERAPY | Age: 67
Setting detail: THERAPIES SERIES
Discharge: HOME | End: 2022-12-27
Payer: COMMERCIAL

## 2022-12-27 PROCEDURE — 97110 THERAPEUTIC EXERCISES: CPT

## 2022-12-29 ENCOUNTER — HOSPITAL ENCOUNTER (OUTPATIENT)
Dept: PHYSICAL THERAPY | Age: 67
Setting detail: THERAPIES SERIES
Discharge: HOME | End: 2022-12-29
Payer: COMMERCIAL

## 2022-12-29 PROCEDURE — 97110 THERAPEUTIC EXERCISES: CPT

## 2022-12-29 NOTE — FLOWSHEET NOTE
[facility]   [address]   Phone: [phone]   Fax: [phone]    Physical Therapy Daily Treatment Note      Date:  2022  Patient Name:  Favian Ojeda    :  1955  MRN: [id number]  Physician: ADRI Casas - CNP // surgeon: Dr. Linda Costa: Pete Dumont [address] - based on [place]  Medical Diagnosis: Z01.818 (ICD-10-CM) - Pre-operative clearance  Rehab Codes: R25 pain , M25.60 stiffness, R53.1 weakness, Z96.652 L TKA   Onset Date: 22- L TKA              Next 's appt: ?  Visit# / total visits:   Cancels/No Shows: 0/0  Date of initial visit: 22        Date of PN: 22 (visit 6)      Precautions: hx of DVT        Subjective:    Patient reprots today that he has been working on L knee motion at home. Noted that pain management continues to be a struggle without meds.         Pain:  [x] Yes  [] No Location: L knee Pain Rating: (0-10 scale) n/a/10  Pain altered Tx:  [x] No  [] Yes  Action:  Comments:    Objective:  INTERVENTIONS  INTERVENTIONS  Reps/ Time Weight/ Level Completed  Today Comments          MODALITIES        vasocompression 10 min Low; 36 deg  Supine with towel behind knee          MANUAL        ISTM - roller stick  5 min    L quad   Patellar mobs- all directions 3 min              EXERCISES        Nustep  5 min Lvl 4  Seat 11 x LE only           Supine        HS stretch with strap 2x30s       Gastroc stretch with strap  3x30s      SAQ 2x10 0-1#  Tactile cues only, no assist needed   Inc weight second set     glute bridge  10x2   Increasing Knee flexion between sets    SLR 2x10   Initially with Yoseph to no assist   Still mild quad lag    Heel slides with OP 15x  x OP from therapist 5\" hold at end range each time      Quad Sets with Towel Roll Under Heel 5x15s  x With therapist over pressure (-3 deg)   hooklying hip abd  2x15 red            TG DL squat  30x Height 10 x                  Standing TKE 20x red x Cues for posture    Step ups: fwd & lateral 10x2 ea 8 in x L ONLY    4 way hip  20x ea  Red  x    Step Hamstring Stretch 3x30\" 8 in x                           Other:  L KNEE ROM  Knee flexion (A/P) Knee extension (A/P)   12/7  72 -20   12/16 102 -8   12/19 103 -3   12/29 114/123 -2/0       Specific Instructions for next treatment: nustep, ROM, add manual to increase knee flexion and extension, strengthening as tolerated (try more in standing, work on ambulation        Assessment: [x] Progressing toward goals. Continued to focus on L knee ROM and strengthening. ROM measurements taken following heel slides and quad sets. Increased height of step ups to increase strengthening potential of exercise. Encouraged less UE use with 4-way hip to further challenge LE stability. Discussed with patient weaning use of walker to [facility]. [] No change. [] Other:    [x] Patient would continue to benefit from skilled physical therapy services in order to: increase ROM, improve strength, control edema, and progress functional mobility to return to near PLOF. GOALS: -assessed 12/19/22  STG: (to be met in 8 treatments)  Pt will self report worst pain no greater than 4/10 in the L knee upon arrival to PT in order to better tolerate ADLs/work activities with minimal dysfunction   12/19: progressing -- still moderate pain   Pt will improve AROM in L knee extension to at least 0 deg to reach terminal stance for ambulation. 12/19: progressing -- -3 deg from full extension   Pt will improved AROM in L knee flexion to >115 deg to be able to sit to low surfaces. 12/19: progressing -- 103 deg knee flexion  Pt will be able to complete sit to stand with BLE with good mechanics to improve ability to transfer throughout the day. 12/19: MET   Pt will have minimal swelling in the LLE showing healing and allowing for ROM to improve.      12/19: progressing   Pt will decrease functional limitation per KOOS JR to <50% in order to demonstrate improved functional tolerances at PLOF with minimal restriction/dysfunction   12/19: will assess in later session. LTG: (to be met in 16 treatments)   Pt will increase strength of all major muscle groups of the LEs to 5/5 or greater demonstrating improved strength needed to maximize safety and efficiency with mobility tasks such as gait, transfers and stairs. Pt will be able to ambulate without AD with normalized gait mechanics to allow him to return to PLOF   Pt will decrease functional limitation per KOOS JR  to <40% in order to demonstrate improved functional tolerances at PLOF with minimal restriction/dysfunction  Pt will demonstrate independence with a long term HEP for continued progress/maintenance after completion of PT     Pt. Education:  [x] Yes  [] No  [x] Reviewed Prior HEP/Ed  Method of Education: [x] Verbal  [] Demo  [] Written  Comprehension of Education:  [x] Verbalizes understanding. [] Demonstrates understanding. [] Needs review. [x] Demonstrates/verbalizes HEP/Ed previously given. Home exercise Program   EXERCISES        Glute squeezes* 10x3     Quad sets* 10x3 Hold 3s   Supine hipd ABD assisted* 10x 3     Seated heel slide assisted * 10x3 Hold 5s         Plan: [x] Continue per plan of care.    [] Other:        Treatment Charges: Mins Units   []  Modalities     [x]  Ther Exercise 45 3   []  Manual Therapy     []  Ther Activities     []  Aquatics     []  Neuromuscular     [] Vasocompression     [] Gait Training     [] Dry needling        [] 1 or 2 muscles        [] 3 or more muscles     []  Other     Total Treatment time 45 3     Time In: 8:45 am            Time Out: 9:30 am      Electronically signed by:  Mari Nicole PTA

## 2023-01-03 ENCOUNTER — HOSPITAL ENCOUNTER (OUTPATIENT)
Dept: PHYSICAL THERAPY | Age: 68
Setting detail: THERAPIES SERIES
Discharge: HOME | End: 2023-01-03
Payer: COMMERCIAL

## 2023-01-03 PROCEDURE — 97116 GAIT TRAINING THERAPY: CPT

## 2023-01-03 PROCEDURE — 97110 THERAPEUTIC EXERCISES: CPT

## 2023-01-03 NOTE — FLOWSHEET NOTE
[facility]   [address]   Phone: [phone]   Fax: [phone]    Physical Therapy Daily Treatment Note      Date:  1/3/2023  Patient Name:  Marti Goodwin    :  1955  MRN: [id number]  Physician: ADRI Toscano - CNP // surgeon: Dr. Maury Mann: Viviana Lazo - based on [place]  Medical Diagnosis: Z01.818 (ICD-10-CM) - Pre-operative clearance  Rehab Codes: R25 pain , M25.60 stiffness, R53.1 weakness, Z96.652 L TKA   Onset Date: 22- L TKA              Next 's appt: ~22  Visit# / total visits:   Cancels/No Shows: 0/0  Date of initial visit: 22        Date of PN: 22 (visit 6)      Precautions: hx of DVT        Subjective:    Patient arrives with 2WW. He notes he tried using a cane Friday but ended up being really sore the following days so he returned to 2WW.         Pain:  [x] Yes  [] No Location: L knee Pain Rating: (0-10 scale) 7/10  Pain altered Tx:  [x] No  [] Yes  Action:  Comments:    Objective:  INTERVENTIONS  INTERVENTIONS  Reps/ Time Weight/ Level Completed  Today Comments          MODALITIES        vasocompression 10 min Low; 36 deg  Supine with towel behind knee          MANUAL        ISTM - roller stick  5 min    L quad   Patellar mobs- all directions 3 min              EXERCISES        Nustep  5 min Lvl 4  Seat 11  LE only    Airdyne bike  5 min 4 holes  x Unable to get full revolution due to knee flexion limitation; after 4 minutes able to get full revolution           Supine        HS stretch with strap 2x30s       Gastroc stretch with strap  3x30s      SAQ 2x10 0-1#  Tactile cues only, no assist needed   Inc weight second set     glute bridge  10x2   Increasing Knee flexion between sets    SLR 2x10   Initially with Yoseph to no assist   Still mild quad lag    Heel slides with OP 15x   OP from therapist 5\" hold at end range each time      Quad Sets with Towel Roll Under Heel 5x15s With therapist over pressure (-3 deg)   hooklying hip abd  2x15 red            TG DL squat  30x Height 10 x    TG staggered squat  2x10  Height 10 x L in front, R toe touching, mainly using LLE           Standing - LLE        TKE 20x Green  x Cues for posture    Step ups: fwd & lateral 10x2 ea 8 in x L ONLY - cues to reach full knee extension    4 way hip  20x ea  Green   x OKC & CKC    Step Hamstring Stretch 3x30\" 8 in x    DF slant board stretch  3x30s  x    Resisted side stepping  5 passes ea  Red  x Band at thigh level   Forward l& lateral lunges  10x ea   x BUE support           GAIT TRAINING        Ambulation no device  220ft   x Cues to focus on rolling through heel to toe    Backwards walking in // bars 10 passes   x Light UE support as needed             Other:  L KNEE ROM  Knee flexion (A/P) Knee extension (A/P)   12/7  72 -20   12/16 102 -8   12/19 103 -3   12/29 114/123 -2/0       Specific Instructions for next treatment: bike for ROM, ROM, add manual to increase knee flexion and extension, strengthening as tolerated (try more in standing, work on ambulation        Assessment: [x] Progressing toward goals. Continued to focus on L knee ROM and strengthening in more functional activities. Completed biking to encourage more knee flexion. Initially unable to complete full revolution but then able to progress. Increased resistance on standing activities. Still needing moderate cues to consistently achieve full knee extension in stance. Added in more walking and dynamic activities to progress away from 2WW. Focusing on getting heel to toe weight translation in stance. Pt has no significant increase in pain. Encouraged to stretch and increase movement prior to walking to increase tolerance. Overall progressing well with no significant pain increase but feeling a soreness. [] No change.      [] Other:    [x] Patient would continue to benefit from skilled physical therapy services in order to: increase ROM, improve strength, control edema, and progress functional mobility to return to near PLOF. GOALS: -assessed 12/19/22  STG: (to be met in 8 treatments)  Pt will self report worst pain no greater than 4/10 in the L knee upon arrival to PT in order to better tolerate ADLs/work activities with minimal dysfunction   12/19: progressing -- still moderate pain   Pt will improve AROM in L knee extension to at least 0 deg to reach terminal stance for ambulation. 12/19: progressing -- -3 deg from full extension   Pt will improved AROM in L knee flexion to >115 deg to be able to sit to low surfaces. 12/19: progressing -- 103 deg knee flexion  Pt will be able to complete sit to stand with BLE with good mechanics to improve ability to transfer throughout the day. 12/19: MET   Pt will have minimal swelling in the LLE showing healing and allowing for ROM to improve. 12/19: progressing   Pt will decrease functional limitation per KOOS JR  to <50% in order to demonstrate improved functional tolerances at PLOF with minimal restriction/dysfunction   12/19: will assess in later session. LTG: (to be met in 16 treatments)   Pt will increase strength of all major muscle groups of the LEs to 5/5 or greater demonstrating improved strength needed to maximize safety and efficiency with mobility tasks such as gait, transfers and stairs. Pt will be able to ambulate without AD with normalized gait mechanics to allow him to return to PLOF   Pt will decrease functional limitation per KOOS JR  to <40% in order to demonstrate improved functional tolerances at PLOF with minimal restriction/dysfunction  Pt will demonstrate independence with a long term HEP for continued progress/maintenance after completion of PT     Pt. Education:  [x] Yes  [] No  [x] Reviewed Prior HEP/Ed  Method of Education: [x] Verbal  [] Demo  [] Written  Comprehension of Education:  [x] Verbalizes understanding.   [] Demonstrates understanding. [] Needs review. [x] Demonstrates/verbalizes HEP/Ed previously given. Home exercise Program   EXERCISES        Glute squeezes* 10x3     Quad sets* 10x3 Hold 3s   Supine hipd ABD assisted* 10x 3     Seated heel slide assisted * 10x3 Hold 5s         Plan: [x] Continue per plan of care.    [] Other:        Treatment Charges: Mins Units   []  Modalities     [x]  Ther Exercise 34 2   []  Manual Therapy     []  Ther Activities     []  Aquatics     []  Neuromuscular     [] Vasocompression     [x] Gait Training 8 1   [] Dry needling        [] 1 or 2 muscles        [] 3 or more muscles     []  Other     Total Treatment time 42 3     Time In: 9:47 pm            Time Out: 6884 am      Electronically signed by:  Chris Silva PT

## 2023-01-05 ENCOUNTER — HOSPITAL ENCOUNTER (OUTPATIENT)
Dept: PHYSICAL THERAPY | Age: 68
Setting detail: THERAPIES SERIES
Discharge: HOME | End: 2023-01-05
Payer: COMMERCIAL

## 2023-01-05 PROCEDURE — 97110 THERAPEUTIC EXERCISES: CPT

## 2023-01-05 NOTE — FLOWSHEET NOTE
509 [facility]   [address]   Phone: [phone]   Fax: [phone]    Physical Therapy Daily Treatment Note      Date:  2023  Patient Name:  Maverick sOorio    :  1955  MRN: [id number]  Physician: Hannah Macias, APRN - CNP // surgeon: Dr. Angela Mendez: Enid Edgar - based on [place]  Medical Diagnosis: Z01.818 (ICD-10-CM) - Pre-operative clearance  Rehab Codes: R25 pain , M25.60 stiffness, R53.1 weakness, Z96.652 L TKA   Onset Date: 22- L TKA              Next 's appt: ~22  Visit# / total visits:   Cancels/No Shows: 0/0  Date of initial visit: 22        Date of PN: 22 (visit 6)      Precautions: hx of DVT        Subjective:    Patient arrives with 2WW bc it is easier to walk with. Pt notes he got a bike at home and plans to use that more to loosen up his knee.         Pain:  [x] Yes  [] No Location: L knee Pain Rating: (0-10 scale) 7/10  Pain altered Tx:  [x] No  [] Yes  Action:  Comments:    Objective:  INTERVENTIONS  INTERVENTIONS  Reps/ Time Weight/ Level Completed  Today Comments          MODALITIES        vasocompression 10 min Low; 36 deg  Supine with towel behind knee          MANUAL        ISTM - roller stick  5 min    L quad   Patellar mobs- all directions 3 min              EXERCISES        Nustep  5 min Lvl 4  Seat 11  LE only    Airdyne bike  5 min 4 holes  x Unable to get full revolution due to knee flexion limitation; after 4 minutes able to get full revolution           Supine        HS stretch with strap 2x30s       Gastroc stretch with strap  3x30s      SAQ 2x10 0-1#  Tactile cues only, no assist needed   Inc weight second set     glute bridge  10x2   Increasing Knee flexion between sets    SLR 2x10   Initially with Yoseph to no assist   Still mild quad lag    Heel slides with OP 15x   OP from therapist 5\" hold at end range each time      Quad Sets with Towel Roll Under Heel 5x15s With therapist over pressure (-3 deg)   hooklying hip abd  2x15 red            TG DL squat  20x Height 10 x    TG staggered squat  20x Height 10 x L in front, R toe touching, mainly using LLE           Standing - LLE        TKE 20x Green  x Cues for posture    Step ups: fwd & lateral 10x2 ea 8 in x L ONLY - cues to reach full knee extension   - gets pain with 1st step ups forward but after quad stretching feels better    4 way hip  20x ea  Green   x OKC & CKC    Step Hamstring Stretch 3x30\" 8 in x    DF slant board stretch  3x30s  x    Standing quad stretch  3x30s  x Foot on chair behind    Resisted side stepping  5x20ft  ea green x Band at thigh level   Forward & lateral lunges  10x ea   x BUE support    Monster walks F/B 3x20ft ea Green x Band at thigh level           GAIT TRAINING        Ambulation no device  220ft    Cues to focus on rolling through heel to toe    Backwards walking in // bars 10 passes    Light UE support as needed             Other:  L KNEE ROM  Knee flexion (A/P) Knee extension (A/P)   12/7  72 -20   12/16 102 -8   12/19 103 -3   12/29 114/123 -2/0       Specific Instructions for next treatment: bike for ROM, ROM, add manual to increase knee flexion and extension, strengthening as tolerated (try more in standing, work on ambulation        Assessment: [x] Progressing toward goals. Continued to focus on L knee ROM and strengthening in more functional activities. Completed biking to encourage more knee flexion--able to do full revolutions the whole time showing better motion. Added in more walking and dynamic activities to progress away from 2WW. Encouraged pt to trail progressive walking at home without AD to wean from 2WW . Pt has no significant increase in pain with session. Overall is progressing well. [] No change.      [] Other:    [x] Patient would continue to benefit from skilled physical therapy services in order to: increase ROM, improve strength, control edema, and progress functional mobility to return to near PLOF. GOALS: -assessed 12/19/22  STG: (to be met in 8 treatments)  Pt will self report worst pain no greater than 4/10 in the L knee upon arrival to PT in order to better tolerate ADLs/work activities with minimal dysfunction   12/19: progressing -- still moderate pain   Pt will improve AROM in L knee extension to at least 0 deg to reach terminal stance for ambulation. 12/19: progressing -- -3 deg from full extension   Pt will improved AROM in L knee flexion to >115 deg to be able to sit to low surfaces. 12/19: progressing -- 103 deg knee flexion  Pt will be able to complete sit to stand with BLE with good mechanics to improve ability to transfer throughout the day. 12/19: MET   Pt will have minimal swelling in the LLE showing healing and allowing for ROM to improve. 12/19: progressing   Pt will decrease functional limitation per KOOS JR  to <50% in order to demonstrate improved functional tolerances at PLOF with minimal restriction/dysfunction   12/19: will assess in later session. LTG: (to be met in 16 treatments)   Pt will increase strength of all major muscle groups of the LEs to 5/5 or greater demonstrating improved strength needed to maximize safety and efficiency with mobility tasks such as gait, transfers and stairs. Pt will be able to ambulate without AD with normalized gait mechanics to allow him to return to PLOF   Pt will decrease functional limitation per KOOS JR  to <40% in order to demonstrate improved functional tolerances at PLOF with minimal restriction/dysfunction  Pt will demonstrate independence with a long term HEP for continued progress/maintenance after completion of PT     Pt. Education:  [x] Yes  [] No  [x] Reviewed Prior HEP/Ed  Method of Education: [x] Verbal  [] Demo  [] Written  Comprehension of Education:  [x] Verbalizes understanding. [] Demonstrates understanding. [] Needs review.   [x] Demonstrates/verbalizes HEP/Ed previously given. Home exercise Program   EXERCISES        Glute squeezes* 10x3     Quad sets* 10x3 Hold 3s   Supine hipd ABD assisted* 10x 3     Seated heel slide assisted * 10x3 Hold 5s         Plan: [x] Continue per plan of care.    [] Other:        Treatment Charges: Mins Units   []  Modalities     [x]  Ther Exercise 44 3   []  Manual Therapy     []  Ther Activities     []  Aquatics     []  Neuromuscular     [] Vasocompression     [] Gait Training     [] Dry needling        [] 1 or 2 muscles        [] 3 or more muscles     []  Other     Total Treatment time 44 3     Time In: 9:30a            Time Out: 9629 am      Electronically signed by:  Cyndy Orr, PT

## 2023-01-10 ENCOUNTER — HOSPITAL ENCOUNTER (OUTPATIENT)
Dept: PHYSICAL THERAPY | Age: 68
Setting detail: THERAPIES SERIES
Discharge: HOME | End: 2023-01-10
Payer: COMMERCIAL

## 2023-01-10 PROCEDURE — 97110 THERAPEUTIC EXERCISES: CPT

## 2023-01-10 NOTE — FLOWSHEET NOTE
[facility]   [address] [facility] [address]   Phone: [phone]   Fax: [phone]    Physical Therapy Daily Treatment Note      Date:  1/10/2023  Patient Name:  Ny Angel    :  1955  MRN: [id number]  Physician: Sola Cortés, APRN - CNP // surgeon: Dr. Charleston Kanner: Deidre Charlton - based on [place]  Medical Diagnosis: Z01.818 (ICD-10-CM) - Pre-operative clearance  Rehab Codes: R25 pain , M25.60 stiffness, R53.1 weakness, Z96.652 L TKA   Onset Date: 22- L TKA              Next 's appt: ~22  Visit# / total visits:   Cancels/No Shows: 0/0  Date of initial visit: 22        Date of PN: 22 (visit 6)      Precautions: hx of DVT        Subjective:    Patient arrives today ambulating with SPC. Reported that he continues to use cart as a walker in larger stores, but uses cane for everything else.       Pain:  [x] Yes  [] No Location: L knee Pain Rating: (0-10 scale) 7/10  Pain altered Tx:  [x] No  [] Yes  Action:  Comments:    Objective:  INTERVENTIONS  INTERVENTIONS  Reps/ Time Weight/ Level Completed  Today Comments          MODALITIES        vasocompression 10 min Low; 36 deg  Supine with towel behind knee          MANUAL        ISTM - roller stick  5 min    L quad   Patellar mobs- all directions 3 min              EXERCISES        Nustep  5 min Lvl 4  Seat 11  LE only    Airdyne bike  5 min 4 holes  x Unable to get full revolution due to knee flexion limitation; after 4 minutes able to get full revolution           Supine        HS stretch with strap 2x30s       Gastroc stretch with strap  3x30s      SAQ 2x10 0-1#  Tactile cues only, no assist needed   Inc weight second set     glute bridge  10x2   Increasing Knee flexion between sets    SLR 2x10   Initially with Yoseph to no assist   Still mild quad lag    Heel slides with OP 15x   OP from therapist 5\" hold at end range each time      Quad Sets with Towel Roll Under Heel 5x15s   With therapist over pressure (-3 deg)   hooklying hip abd  2x15 red            TG DL squat  20x Height 10 x    TG staggered squat  20x Height 10 x L in front, R toe touching, mainly using LLE           Standing - LLE        TKE 20x Green  x Cues for posture    Step ups: fwd & lateral 10x2 ea 8 in x L ONLY - cues to reach full knee extension   - gets pain with 1st step ups forward but after quad stretching feels better    4 way hip  20x ea  Green   x OKC & CKC    Step Hamstring Stretch 3x30\" 8 in x    DF slant board stretch  3x30s  x    Standing quad stretch  3x30s  x Foot on chair behind    Resisted side stepping  5x20ft  ea green x Band at thigh level   Forward & lateral lunges  10x ea   x BUE support    Monster walks F/B 3x20ft ea Green x Band at thigh level           GAIT TRAINING        Ambulation no device  220ft    Cues to focus on rolling through heel to toe    Backwards walking in // bars 10 passes    Light UE support as needed             Other:  L KNEE ROM  Knee flexion (A/P) Knee extension (A/P)   12/7  72 -20   12/16 102 -8   12/19 103 -3   12/29 114/123 -2/0   1/10 120/123 -2/0       Specific Instructions for next treatment: bike for ROM, ROM, add manual to increase knee flexion and extension, strengthening as tolerated (try more in standing, work on ambulation        Assessment:   1/10: Continued to work on L knee strength and ROM. Intermittent cues provided with exercises for technique/posture with good improvement demonstrated following cues. Improvement in active knee flexion demonstrated today as measured above. [x] Progressing toward goals. [] No change. [] Other:    [x] Patient would continue to benefit from skilled physical therapy services in order to: increase ROM, improve strength, control edema, and progress functional mobility to return to near PLOF.      GOALS: -assessed 12/19/22  STG: (to be met in 8 treatments)  Pt will self report worst pain no greater than 4/10 in the L knee upon arrival to PT in order to better tolerate ADLs/work activities with minimal dysfunction   12/19: progressing -- still moderate pain   Pt will improve AROM in L knee extension to at least 0 deg to reach terminal stance for ambulation. 12/19: progressing -- -3 deg from full extension   Pt will improved AROM in L knee flexion to >115 deg to be able to sit to low surfaces. 12/19: progressing -- 103 deg knee flexion  Pt will be able to complete sit to stand with BLE with good mechanics to improve ability to transfer throughout the day. 12/19: MET   Pt will have minimal swelling in the LLE showing healing and allowing for ROM to improve. 12/19: progressing   Pt will decrease functional limitation per KOOS JR  to <50% in order to demonstrate improved functional tolerances at PLOF with minimal restriction/dysfunction   12/19: will assess in later session. LTG: (to be met in 16 treatments)   Pt will increase strength of all major muscle groups of the LEs to 5/5 or greater demonstrating improved strength needed to maximize safety and efficiency with mobility tasks such as gait, transfers and stairs. Pt will be able to ambulate without AD with normalized gait mechanics to allow him to return to PLOF   Pt will decrease functional limitation per KOOS JR  to <40% in order to demonstrate improved functional tolerances at PLOF with minimal restriction/dysfunction  Pt will demonstrate independence with a long term HEP for continued progress/maintenance after completion of PT     Pt. Education:  [x] Yes  [] No  [x] Reviewed Prior HEP/Ed  Method of Education: [x] Verbal  [x] Demo  [] Written  Comprehension of Education: Verbally reviewed exercises to perform at home to promote knee extension, verbally added TKE's/hamstring stretch to HEP. [x] Verbalizes understanding. [] Demonstrates understanding. [] Needs review. [x] Demonstrates/verbalizes HEP/Ed previously given.     Home exercise Program   EXERCISES        Glute squeezes* 10x3     Quad sets* 10x3 Hold 3s   Supine hipd ABD assisted* 10x 3     Seated heel slide assisted * 10x3 Hold 5s    Step Hamstring Stretch 3x30\"    TKE's 20x Green        Plan: [x] Continue per plan of care.    [] Other:        Treatment Charges: Mins Units   []  Modalities     [x]  Ther Exercise 43 3   []  Manual Therapy     []  Ther Activities     []  Aquatics     []  Neuromuscular     [] Vasocompression     [] Gait Training     [] Dry needling        [] 1 or 2 muscles        [] 3 or more muscles     []  Other     Total Treatment time 43 3     Time In: 9:32 am            Time Out: 10:15 am    Electronically signed by:  Tawny Chase PTA

## 2023-01-12 ENCOUNTER — HOSPITAL ENCOUNTER (OUTPATIENT)
Dept: PHYSICAL THERAPY | Age: 68
Setting detail: THERAPIES SERIES
Discharge: HOME | End: 2023-01-12
Payer: COMMERCIAL

## 2023-01-12 PROCEDURE — 97110 THERAPEUTIC EXERCISES: CPT

## 2023-01-12 NOTE — FLOWSHEET NOTE
[facility]   [address]   Phone: [phone]   Fax: [phone]    Physical Therapy Daily Treatment Note      Date:  2023  Patient Name:  Darshan Wright    :  1955  MRN: [id number]  Physician: ADRI Reed - CNP // surgeon: Dr. Carla Flores: Orlando Germain - based [place]  Medical Diagnosis: Z01.818 (ICD-10-CM) - Pre-operative clearance  Rehab Codes: R25 pain , M25.60 stiffness, R53.1 weakness, Z96.652 L TKA   Onset Date: 22- L TKA              Next 's appt: ~22  Visit# / total visits:   Cancels/No Shows: 0/0  Date of initial visit: 22        Date of PN: 22 (visit 6)      Precautions: hx of DVT        Subjective:    Patient arrives today ambulating with SPC. Reports he is still dealing a lot of pain last night. Has been trying to ride his bike at home more but still feeling stiffness. He has been trying to increase his activity levels but feels limited.      Pain:  [x] Yes  [] No Location: L knee Pain Rating: (0-10 scale) 5/10  Pain altered Tx:  [x] No  [] Yes  Action:  Comments:    Objective:  INTERVENTIONS  INTERVENTIONS  Reps/ Time Weight/ Level Completed  Today Comments          MODALITIES        vasocompression 10 min Low; 36 deg  Supine with towel behind knee          MANUAL        ISTM - roller stick  5 min    L quad   Patellar mobs- all directions 3 min              EXERCISES        Nustep  5 min Lvl 4  Seat 11  LE only    Airdyne bike  5 min 4 holes  x           Supine        HS stretch with strap 2x30s       Gastroc stretch with strap  3x30s      SAQ 2x10 0-1#  Tactile cues only, no assist needed   Inc weight second set     glute bridge  10x2   Increasing Knee flexion between sets    SLR 2x10   Initially with Yoseph to no assist   Still mild quad lag    Heel slides with OP 15x   OP from therapist 5\" hold at end range each time      Quad Sets with Towel Roll Under Heel 5x15s   With therapist over pressure (-3 deg)   hooklying hip abd  2x15 red            TG DL squat  30x Height 10 x    TG staggered squat  20x Height 10 x L in front, R toe touching, mainly using LLE           Standing - LLE        TKE 20x blue x Cues for posture    Step ups: fwd & lateral 10x2 ea 8 in  L ONLY - cues to reach full knee extension   - gets pain with 1st step ups forward but after quad stretching feels better    4 way hip  20x ea  blue   x OKC & CKC    Step Hamstring Stretch 3x30\" 8 in x    DF slant board stretch  3x30s  x    Standing quad stretch  3x30s  x Foot on chair behind    Resisted side stepping  5x20ft  ea green  Band at thigh level   Forward & lateral lunges  10x ea    BUE support    Monster walks F/B 5x20ft ea Green x Band at thigh level           GAIT TRAINING        Ambulation no device  220ft    Cues to focus on rolling through heel to toe    Backwards walking in // bars 10 passes    Light UE support as needed             Other:  L KNEE ROM  Knee flexion (A/P) Knee extension (A/P)   12/7  72 -20   12/16 102 -8   12/19 103 -3   12/29 114/123 -2/0   1/10 120/123 -2/0   1/12 115 on bike  -2 active/ 0 passive       Specific Instructions for next treatment: bike for ROM, ROM, add manual to increase knee flexion and extension, strengthening as tolerated (try more in standing, work on ambulation        Assessment:   Continued working on functional knee ROM and strength. Slightly more limited in flexion but still lacking active knee extension. Feel this is a hamstring limitation. Emphasized stretching the hamstring to maximize this range. Pt still having pain down into the lower leg and difficulty with putting his socks on. Feel this is due to the global tightness around the knee. Overall he is progressing and will follow up after he sees the surgeon next week. [x] Progressing toward goals. [] No change.      [] Other:    [x] Patient would continue to benefit from skilled physical therapy services in order to: increase ROM, improve strength, control edema, and progress functional mobility to return to near PLOF. GOALS: -assessed 12/19/22  STG: (to be met in 8 treatments)  Pt will self report worst pain no greater than 4/10 in the L knee upon arrival to PT in order to better tolerate ADLs/work activities with minimal dysfunction   12/19: progressing -- still moderate pain   Pt will improve AROM in L knee extension to at least 0 deg to reach terminal stance for ambulation. 12/19: progressing -- -3 deg from full extension   Pt will improved AROM in L knee flexion to >115 deg to be able to sit to low surfaces. 12/19: progressing -- 103 deg knee flexion  Pt will be able to complete sit to stand with BLE with good mechanics to improve ability to transfer throughout the day. 12/19: MET   Pt will have minimal swelling in the LLE showing healing and allowing for ROM to improve. 12/19: progressing   Pt will decrease functional limitation per KOOS JR  to <50% in order to demonstrate improved functional tolerances at PLOF with minimal restriction/dysfunction   12/19: will assess in later session. LTG: (to be met in 16 treatments)   Pt will increase strength of all major muscle groups of the LEs to 5/5 or greater demonstrating improved strength needed to maximize safety and efficiency with mobility tasks such as gait, transfers and stairs. Pt will be able to ambulate without AD with normalized gait mechanics to allow him to return to PLOF   Pt will decrease functional limitation per KOOS JR  to <40% in order to demonstrate improved functional tolerances at PLOF with minimal restriction/dysfunction  Pt will demonstrate independence with a long term HEP for continued progress/maintenance after completion of PT     Pt.  Education:  [x] Yes  [] No  [x] Reviewed Prior HEP/Ed  Method of Education: [x] Verbal  [x] Demo  [] Written  Comprehension of Education: Verbally reviewed exercises to perform at home to promote knee extension, verbally added TKE's/hamstring stretch to HEP. [x] Verbalizes understanding. [] Demonstrates understanding. [] Needs review. [x] Demonstrates/verbalizes HEP/Ed previously given. Home exercise Program   EXERCISES        Glute squeezes* 10x3     Quad sets* 10x3 Hold 3s   Supine hipd ABD assisted* 10x 3     Seated heel slide assisted * 10x3 Hold 5s    Step Hamstring Stretch 3x30\"    TKE's 20x Green        Plan: [x] Continue per plan of care.    [] Other:        Treatment Charges: Mins Units   []  Modalities     [x]  Ther Exercise 40 3   []  Manual Therapy     []  Ther Activities     []  Aquatics     []  Neuromuscular     [] Vasocompression     [] Gait Training     [] Dry needling        [] 1 or 2 muscles        [] 3 or more muscles     []  Other     Total Treatment time 40 3     Time In: 9:45 am            Time Out: 7494  am    Electronically signed by:  Joanna Mayfield PT

## 2023-01-16 ENCOUNTER — OFFICE VISIT (OUTPATIENT)
Dept: FAMILY MEDICINE CLINIC | Age: 68
End: 2023-01-16
Payer: COMMERCIAL

## 2023-01-16 VITALS
DIASTOLIC BLOOD PRESSURE: 62 MMHG | TEMPERATURE: 97.2 F | OXYGEN SATURATION: 97 % | SYSTOLIC BLOOD PRESSURE: 114 MMHG | WEIGHT: 257 LBS | BODY MASS INDEX: 34.86 KG/M2 | HEART RATE: 88 BPM

## 2023-01-16 DIAGNOSIS — I10 BENIGN ESSENTIAL HYPERTENSION: ICD-10-CM

## 2023-01-16 DIAGNOSIS — Z86.718 HISTORY OF RECURRENT DEEP VEIN THROMBOSIS (DVT): ICD-10-CM

## 2023-01-16 DIAGNOSIS — E11.9 CONTROLLED TYPE 2 DIABETES MELLITUS WITHOUT COMPLICATION, WITHOUT LONG-TERM CURRENT USE OF INSULIN (HCC): Primary | ICD-10-CM

## 2023-01-16 DIAGNOSIS — E78.2 MIXED HYPERLIPIDEMIA: ICD-10-CM

## 2023-01-16 LAB — HBA1C MFR BLD: 6.4 %

## 2023-01-16 PROCEDURE — 3074F SYST BP LT 130 MM HG: CPT | Performed by: NURSE PRACTITIONER

## 2023-01-16 PROCEDURE — 3078F DIAST BP <80 MM HG: CPT | Performed by: NURSE PRACTITIONER

## 2023-01-16 PROCEDURE — 3044F HG A1C LEVEL LT 7.0%: CPT | Performed by: NURSE PRACTITIONER

## 2023-01-16 PROCEDURE — 1123F ACP DISCUSS/DSCN MKR DOCD: CPT | Performed by: NURSE PRACTITIONER

## 2023-01-16 PROCEDURE — 99214 OFFICE O/P EST MOD 30 MIN: CPT | Performed by: NURSE PRACTITIONER

## 2023-01-16 PROCEDURE — 83036 HEMOGLOBIN GLYCOSYLATED A1C: CPT | Performed by: NURSE PRACTITIONER

## 2023-01-16 RX ORDER — LISINOPRIL AND HYDROCHLOROTHIAZIDE 20; 12.5 MG/1; MG/1
1 TABLET ORAL DAILY
Qty: 90 TABLET | Refills: 1 | Status: SHIPPED | OUTPATIENT
Start: 2023-01-16

## 2023-01-16 RX ORDER — ROSUVASTATIN CALCIUM 5 MG/1
TABLET, COATED ORAL
Qty: 90 TABLET | Refills: 1 | Status: SHIPPED | OUTPATIENT
Start: 2023-01-16

## 2023-01-16 RX ORDER — AMLODIPINE BESYLATE 10 MG/1
10 TABLET ORAL DAILY
Qty: 90 TABLET | Refills: 1 | Status: SHIPPED | OUTPATIENT
Start: 2023-01-16

## 2023-01-16 ASSESSMENT — PATIENT HEALTH QUESTIONNAIRE - PHQ9
1. LITTLE INTEREST OR PLEASURE IN DOING THINGS: 0
SUM OF ALL RESPONSES TO PHQ9 QUESTIONS 1 & 2: 0
2. FEELING DOWN, DEPRESSED OR HOPELESS: 0
SUM OF ALL RESPONSES TO PHQ QUESTIONS 1-9: 0

## 2023-01-16 ASSESSMENT — ENCOUNTER SYMPTOMS
ALLERGIC/IMMUNOLOGIC NEGATIVE: 1
GASTROINTESTINAL NEGATIVE: 1
COLOR CHANGE: 0
RESPIRATORY NEGATIVE: 1
CHEST TIGHTNESS: 0
COUGH: 0
WHEEZING: 0
SHORTNESS OF BREATH: 0
EYES NEGATIVE: 1

## 2023-01-16 NOTE — PROGRESS NOTES
[facility]  [address] [facility]  Dept: [phone]    Andrea Moreno is a 78 y.o. male who presents today for his medical conditions/complaints as noted below. Andrea Moreno is here today c/o Diabetes, Hypertension, and Hyperlipidemia    Past Medical History:   Diagnosis Date    Deep vein thrombosis (HCC)     x 2    Diabetes type 2, controlled ([address]     Millstone filter in place     Hyperlipidemia     Hypertension     Obesity     Unspecified sleep apnea       Past Surgical History:   Procedure Laterality Date    COLONOSCOPY  8/26/14    KNEE ARTHROSCOPY Left     TONSILLECTOMY      VENA CAVA FILTER PLACEMENT      [place] Filter       Family History   Problem Relation Age of Onset    Diabetes Mother     Cancer Mother         Blood disorder     Cancer Father 79        Prostate cancer       Social History     Tobacco Use    Smoking status: Former     Packs/day: 2.50     Years: 20.00     Pack years: 50.00     Types: Cigarettes     Quit date: 2/12/2007     Years since quitting: 15.9    Smokeless tobacco: Never   Substance Use Topics    Alcohol use:  Yes     Alcohol/week: 3.3 - 4.2 standard drinks     Types: 4 - 5 Standard drinks or equivalent per week     Comment: occasional / weekends       Current Outpatient Medications   Medication Sig Dispense Refill    vitamin D 25 MCG (1000 UT) CAPS Take 2,000 Units by mouth 2 times daily (with meals)      Cholecalciferol (VITAMIN D3) 25 MCG (1000 UT) CAPS TAKE 2 CAPSULES BY MOUTH IN THE MORNING AND 2 CAPSULES BY MOUTH IN THE EVENING WITH MEALS      rosuvastatin (CRESTOR) 5 MG tablet TAKE 1 TABLET BY MOUTH DAILY 90 tablet 3    metFORMIN (GLUCOPHAGE) 500 MG tablet Take 1 tablet by mouth daily 90 tablet 3    amLODIPine (NORVASC) 10 MG tablet Take 1 tablet by mouth daily 90 tablet 3    lisinopril-hydroCHLOROthiazide (PRINZIDE;ZESTORETIC) 20-12.5 MG per tablet Take 1 tablet by mouth daily 90 tablet 3    apixaban (ELIQUIS) 5 MG TABS tablet Take 1 tablet by mouth 2 times daily 180 tablet 3     No current facility-administered medications for this visit. No Known Allergies      HPI:     HPI    Presents today c/o DM2 / HTN / HLD follow-up     H/o DM2   Taking Metformin   Declines medication side effects   Not monitoring blood sugar at home   Hypoglycemia nothing obvious   Declines any complications including neuropathy    Weight is decreased by 15+ lbs   Reports his appetite is decreased , he didn't want to get constipated post knee replacement so he lost 10 lbs within the first week of surgery   No regular exercise regimen, is currently in PT for post knee replacement      H/o HTN   Taking amlodipine, lisinopril/hctz   Tolerating medications      H/o HLD   Taking Crestor      H/o DVT x 2    , sits for extended periods of times  Affiliated [facility], has [facility]  Hematology Dr. Lamin Sellers h/o multiple DVT / thrombocytopenia  DX hemochromatosis q6mo blood draws   Was told to avoid foods high in iron      Follows w/ [address] [facility] ([place]) - compliant with Yonis Aguiar (knee arthritis)    Health Maintenance:      Subjective:     Review of Systems   Constitutional: Negative. Negative for appetite change, chills, diaphoresis, fatigue, fever and unexpected weight change. Eyes: Negative. Respiratory: Negative. Negative for cough, chest tightness, shortness of breath and wheezing. Cardiovascular: Negative. Negative for chest pain and leg swelling. Gastrointestinal: Negative. Endocrine: Negative. Genitourinary: Negative. Negative for difficulty urinating. Musculoskeletal:  Positive for arthralgias. Skin: Negative. Negative for color change, pallor, rash and wound. Allergic/Immunologic: Negative. Neurological: Negative. Negative for dizziness, seizures, syncope, light-headedness and headaches. Hematological: Negative. Psychiatric/Behavioral: Negative.   Negative for dysphoric mood. The patient is not nervous/anxious. Objective:     Vitals:    01/16/23 0752   BP: 114/62   Pulse: 88   Temp: 97.2 °F (36.2 °C)   SpO2: 97%       Body mass index is 34.86 kg/m². Physical Exam  Constitutional:       General: He is not in acute distress. Appearance: Normal appearance. He is well-developed. He is obese. He is not ill-appearing, toxic-appearing or diaphoretic. HENT:      Head: Normocephalic and atraumatic. Right Ear: External ear normal.      Left Ear: External ear normal.      Nose: Nose normal.   Eyes:      General: No scleral icterus. Right eye: No discharge. Left eye: No discharge. Conjunctiva/sclera: Conjunctivae normal.      Pupils: Pupils are equal, round, and reactive to light. Neck:      Vascular: No carotid bruit. Trachea: No tracheal deviation. Cardiovascular:      Rate and Rhythm: Normal rate and regular rhythm. Heart sounds: Normal heart sounds. No murmur heard. No friction rub. No gallop. Pulmonary:      Effort: Pulmonary effort is normal. No tachypnea, accessory muscle usage or respiratory distress. Breath sounds: Normal breath sounds. No stridor. No decreased breath sounds, wheezing, rhonchi or rales. Abdominal:      Palpations: Abdomen is soft. Musculoskeletal:         General: No tenderness or deformity. Normal range of motion. Cervical back: Normal range of motion and neck supple. Skin:     General: Skin is warm and dry. Coloration: Skin is not pale. Findings: No erythema or rash. Neurological:      Mental Status: He is alert and oriented to person, place, and time. GCS: GCS eye subscore is 4. GCS verbal subscore is 5. GCS motor subscore is 6. Gait: Gait normal.   Psychiatric:         Speech: Speech normal.         Behavior: Behavior normal.         Thought Content: Thought content normal.         Judgment: Judgment normal.         Assessment:         1.  Controlled type 2 diabetes mellitus without complication, without long-term current use of insulin ([facility] [address]    2. Benign essential hypertension    3. Mixed hyperlipidemia    4. History of recurrent deep vein thrombosis (DVT)        Plan:     1. Controlled type 2 diabetes mellitus without complication, without long-term current use of insulin ([facility])    - POCT glycosylated hemoglobin (Hb A1C)  - metFORMIN (GLUCOPHAGE) 500 MG tablet; Take 1 tablet by mouth daily  Dispense: 90 tablet; Refill: 1  - CBC with Auto Differential; Future  - Comprehensive Metabolic Panel; Future  - Lipid, Fasting; Future  - Hemoglobin A1C; Future  - Microalbumin, Ur; Future  - TSH with Reflex; Future    A1C well controlled at goal, continue current medication    Instructed goal A1C 6.5-7 %   Advised medication benefits and side effects   Advised dietary recommendations including avoidance of carbs and concentrated sweets  Importance of daily physical activity and weight loss in disease management  Discussed importance of statin and ASCVD benefits  Advised yearly diabetic eye examinations  Advised routine monitoring of feet  Importance to notify if hypoglycemia sx and directions  Importance of regular meals with insulin/medications   Adverse effects of uncontrolled diabetes including heart disease, CVA, kidney disease, neuropathy, retinopathy, PVD etc.  Importance of routine follow-up every 3 months is key     2. Benign essential hypertension    - amLODIPine (NORVASC) 10 MG tablet; Take 1 tablet by mouth daily  Dispense: 90 tablet; Refill: 1  - lisinopril-hydroCHLOROthiazide (PRINZIDE;ZESTORETIC) 20-12.5 MG per tablet; Take 1 tablet by mouth daily  Dispense: 90 tablet; Refill: 1  - CBC with Auto Differential; Future  - Comprehensive Metabolic Panel; Future  - TSH with Reflex; Future    BP controlled, continue meds  Update labs when due  Lifestyle modifications encouraged     3.  Mixed hyperlipidemia    - rosuvastatin (CRESTOR) 5 MG tablet; TAKE 1 TABLET BY MOUTH DAILY  Dispense: 90 tablet; Refill: 1  - Lipid, Fasting; Future    4. History of recurrent deep vein thrombosis (DVT)    - apixaban (ELIQUIS) 5 MG TABS tablet; Take 1 tablet by mouth 2 times daily  Dispense: 180 tablet; Refill: 1    Follows w/ Hematology       Discussed use, benefit, and side effects of prescribed medications. All patient questions answered. Pt voiced understanding. Reviewed health maintenance. Instructed to continue current medications, diet and exercise. Patient agreedwith treatment plan. Follow up as directed.      Electronically signed by ADRI Dunn CNP on 1/16/2023

## 2023-01-18 ENCOUNTER — HOSPITAL ENCOUNTER (OUTPATIENT)
Dept: PHYSICAL THERAPY | Age: 68
Setting detail: THERAPIES SERIES
Discharge: HOME | End: 2023-01-18
Payer: COMMERCIAL

## 2023-01-18 PROCEDURE — 97110 THERAPEUTIC EXERCISES: CPT

## 2023-01-18 NOTE — FLOWSHEET NOTE
Immediate Brief Procedure Note    Patient: Leona Hartman    Pre-op Dx: DDD PM RHONDA    Post-op Dx: Same    Procedure: DDD PM generator change    Surgeon:  Damaso Kenyon MD    Assistants: SA Marion    Anesthesia Staff: Anesthesiologist: Bayron Fermin MD  Anesthesia Technician: Randi Williamson    Anesthesia Type: MAC, 10 cc 0.25% Marcaine    Findings: None    Estimated Blood Loss: None    Complications: None    Specimens Removed: Old generator   [facility]   [address]   Phone: [phone]   Fax: [phone]    Physical Therapy Daily Treatment Note      Date:  2023  Patient Name:  Kaleigh Cid    :  1955  MRN: [id number]  Physician: Man Urrutia, APRN - CNP // surgeon: Dr. Islas Cast: [place] - based on [place]  Medical Diagnosis: Z01.818 (ICD-10-CM) - Pre-operative clearance  Rehab Codes: R25 pain , M25.60 stiffness, R53.1 weakness, Z96.652 L TKA   Onset Date: 22- L TKA              Next 's appt: ~22  Visit# / total visits: 15/16  Cancels/No Shows: 0/0  Date of initial visit: 22        Date of PN: 22 (visit 6)      Precautions: hx of DVT        Subjective:    Patient reports today that L knee feels really well today. Reported that he had appointment with  yesterday and that are happy with progress. Per pt,  is leaving thearpy up to patient discretion in which patient reported that he feels ready to discharge from PT following last scheduled visit .      Pain:  [x] Yes  [] No Location: L knee Pain Rating: (0-10 scale) 5/10  Pain altered Tx:  [x] No  [] Yes  Action:  Comments:    Objective:  INTERVENTIONS  INTERVENTIONS  Reps/ Time Weight/ Level Completed  Today Comments          MODALITIES        vasocompression 10 min Low; 36 deg  Supine with towel behind knee          MANUAL        ISTM - roller stick  5 min    L quad   Patellar mobs- all directions 3 min              EXERCISES        Nustep  5 min Lvl 4  Seat 11  LE only    Airdyne bike  5 min 4 holes  x           Supine        HS stretch with strap 2x30s       Gastroc stretch with strap  3x30s      SAQ 2x10 0-1#  Tactile cues only, no assist needed   Inc weight second set     glute bridge  10x2   Increasing Knee flexion between sets    SLR 2x10   Initially with Yoseph to no assist   Still mild quad lag    Heel slides with OP 15x   OP from therapist 5\" hold at end range each time      [employer] with Towel Roll Under Heel 5x15s   With therapist over pressure (-3 deg)   hooklying hip abd  2x15 red            TG DL squat  30x Height 10 x    TG staggered squat  20x Height 10 x L in front, R toe touching, mainly using LLE           Standing - LLE        TKE 20x blue x Cues for posture    Step ups: fwd & lateral 10x2 ea 8 in  L ONLY - cues to reach full knee extension   - gets pain with 1st step ups forward but after quad stretching feels better    4 way hip  20x ea  blue   x OKC & CKC    Step Hamstring Stretch 3x30\" 8 in x    DF slant board stretch  3x30s  x    Standing quad stretch  3x30s  x Foot on chair behind    Resisted side stepping  5x20ft  ea green  Band at thigh level   Forward & lateral lunges  10x ea    BUE support    Monster walks F/B 5x20ft ea Green  Band at thigh level           GAIT TRAINING        Ambulation no device  220ft    Cues to focus on rolling through heel to toe    Backwards walking in // bars 10 passes    Light UE support as needed             Other:  L KNEE ROM  Knee flexion (A/P) Knee extension (A/P)   12/7  72 -20   12/16 102 -8   12/19 103 -3   12/29 114/123 -2/0   1/10 120/123 -2/0   1/12 115 on bike  -2 active/ 0 passive       Specific Instructions for next treatment: bike for ROM, ROM, add manual to increase knee flexion and extension, strengthening as tolerated (try more in standing, work on ambulation        Assessment:   Continued working on functional knee ROM and strength with charted exercises. Intermittent cuing provided throughout exercises to remain on task and to improve posture/technique. Overall significantly less stiffness in L knee compared to last visit with provider on 1/10. [x] Progressing toward goals. [] No change.      [] Other:    [x] Patient would continue to benefit from skilled physical therapy services in order to: increase ROM, improve strength, control edema, and progress functional mobility to return to near PLOF.     GOALS: -assessed 12/19/22  STG: (to be met in 8 treatments)  Pt will self report worst pain no greater than 4/10 in the L knee upon arrival to PT in order to better tolerate ADLs/work activities with minimal dysfunction   12/19: progressing -- still moderate pain   Pt will improve AROM in L knee extension to at least 0 deg to reach terminal stance for ambulation. 12/19: progressing -- -3 deg from full extension   Pt will improved AROM in L knee flexion to >115 deg to be able to sit to low surfaces. 12/19: progressing -- 103 deg knee flexion  Pt will be able to complete sit to stand with BLE with good mechanics to improve ability to transfer throughout the day. 12/19: MET   Pt will have minimal swelling in the LLE showing healing and allowing for ROM to improve. 12/19: progressing   Pt will decrease functional limitation per KOOS JR  to <50% in order to demonstrate improved functional tolerances at PLOF with minimal restriction/dysfunction   12/19: will assess in later session. LTG: (to be met in 16 treatments)   Pt will increase strength of all major muscle groups of the LEs to 5/5 or greater demonstrating improved strength needed to maximize safety and efficiency with mobility tasks such as gait, transfers and stairs. Pt will be able to ambulate without AD with normalized gait mechanics to allow him to return to PLOF   Pt will decrease functional limitation per KOOS JR  to <40% in order to demonstrate improved functional tolerances at PLOF with minimal restriction/dysfunction  Pt will demonstrate independence with a long term HEP for continued progress/maintenance after completion of PT     Pt. Education:  [x] Yes  [] No  [x] Reviewed Prior HEP/Ed  Method of Education: [x] Verbal  [x] Demo  [] Written  Comprehension of Education: Verbally reviewed exercises to perform at home to promote knee extension, verbally added TKE's/hamstring stretch to HEP. [x] Verbalizes understanding.   [] Demonstrates understanding. [] Needs review. [x] Demonstrates/verbalizes HEP/Ed previously given. Home exercise Program   EXERCISES        Glute squeezes* 10x3     Quad sets* 10x3 Hold 3s   Supine hipd ABD assisted* 10x 3     Seated heel slide assisted * 10x3 Hold 5s    Step Hamstring Stretch 3x30\"    TKE's 20x Green        Plan: [x] Continue per plan of care.    [] Other:        Treatment Charges: Mins Units   []  Modalities     [x]  Ther Exercise 43 3   []  Manual Therapy     []  Ther Activities     []  Aquatics     []  Neuromuscular     [] Vasocompression     [] Gait Training     [] Dry needling        [] 1 or 2 muscles        [] 3 or more muscles     []  Other     Total Treatment time 43 3     Time In: 9:30 am            Time Out: 10:13 am    Electronically signed by:  Greer Wise PTA

## 2023-01-20 ENCOUNTER — HOSPITAL ENCOUNTER (OUTPATIENT)
Dept: PHYSICAL THERAPY | Age: 68
Setting detail: THERAPIES SERIES
Discharge: HOME | End: 2023-01-20
Payer: COMMERCIAL

## 2023-01-20 PROCEDURE — 97110 THERAPEUTIC EXERCISES: CPT

## 2023-01-20 ASSESSMENT — KOOS JR
TWISING OR PIVOTING ON KNEE: 1
STRAIGHTENING KNEE FULLY: 1
BENDING TO THE FLOOR TO PICK UP OBJECT: 1
GOING UP OR DOWN STAIRS: 1
KOOS JR TOTAL INTERVAL SCORE: 65.994
RISING FROM SITTING: 1
STANDING UPRIGHT: 1
HOW SEVERE IS YOUR KNEE STIFFNESS AFTER FIRST WAKING IN MORNING: 2

## 2023-01-20 NOTE — PROGRESS NOTES
[facility]   [address]   Phone: [phone]   Fax: [phone]    Physical Therapy Daily Treatment Note/Discharge Note       Date:  2023  Patient Name:  Douglas Walters    :  1955  MRN: [id number]  Physician: ADRI Armenta - CNP // surgeon: Dr. Ford                               Insurance: [facility] - based on [place]  Medical Diagnosis: Z01.818 (ICD-10-CM) - Pre-operative clearance  Rehab Codes: R25 pain , M25.60 stiffness, R53.1 weakness, Z96.652 L TKA   Onset Date: 22- L TKA              Next 's appt: ~22  Visit# / total visits:   Cancels/No Shows: 0/0  Date of initial visit: 22        Date of PN: 22 (visit 6); 23 (visit 16)    Formal progress note reporting period:  22 - 23       Precautions: hx of DVT        Subjective:    Patient reports today without AD. He notes he is doing well. He has most discomfort in lateral L lower leg & most difficulty on stairs. He feels overall he is ready to discharged     Pain:  [x] Yes  [] No Location: L knee Pain Rating: (0-10 scale) 5/10  Pain altered Tx:  [x] No  [] Yes  Action:  Comments:    Objective:  INTERVENTIONS  INTERVENTIONS  Reps/ Time Weight/ Level Completed  Today Comments          MODALITIES        vasocompression 10 min Low; 36 deg  Supine with towel behind knee          MANUAL        ISTM - roller stick  5 min    L quad   Patellar mobs- all directions 3 min              EXERCISES        Nustep  5 min Lvl 4  Seat 11  LE only    Airdyne bike  5 min 3 holes  x                  TG DL squat  30x Height 10     TG staggered squat  20x Height 10  L in front, R toe touching, mainly using LLE           Standing - LLE        TKE 20x blue x Cues for posture    Step ups: fwd & lateral 10x2 ea 8 in  L ONLY - cues to reach full knee extension   - gets pain with 1st step ups forward but after quad stretching feels better    Step down taps: fwd/ lateral 10x ea   x   4 way hip  20x ea  blue   x OKC & CKC    Step Hamstring Stretch 3x30\" 8 in x    DF slant board stretch  3x30s  x Feet slightly ER    Standing quad stretch  3x30s   Foot on chair behind    Resisted side stepping  5x20ft  ea green  Band at thigh level   Forward & lateral lunges  10x ea    BUE support    Monster walks F/B 5x20ft ea Green  Band at thigh level           GAIT TRAINING        Ambulation no device  220ft    Cues to focus on rolling through heel to toe    Backwards walking in // bars 10 passes    Light UE support as needed             Other:  L KNEE ROM  Knee flexion (A/P) Knee extension (A/P)   12/7  72 -20   12/16 102 -8   12/19 103 -3   12/29 114/123 -2/0   1/10 120/123 -2/0   1/12 115 on bike  -2 active/ 0 passive   1/20 115 deg 0 deg   L knee & hip strength   Q: 5/5  HS:5/5  HF: 5/5  Hip ABD: 5/5  KOOS JR: 8 pts = 34% functional limitation           Assessment:   Pt has now completed all visits in [facility] (16 total). He has improved to [facility] for L knee ROM and LLE strength. He is now able to ambulate without AD and completing all necessary daily activities. He is still having moderate pain in the L lateral knee but expect this as he continues to heal. Significant improvement with all measures. Gave a blue theraband for HEP. Verbalizes understanding of all HEP exercises. Pt has MET most goals, other than pain goal. Feel he will continue to improve. At this time will close this episode of care and discharge to [facility]. GOALS: -assessed 12/19/22  STG: (to be met in 8 treatments)  Pt will self report worst pain no greater than 4/10 in the L knee upon arrival to PT in order to better tolerate ADLs/work activities with minimal dysfunction   12/19: progressing -- still moderate pain    1/20: NOT met -- still 5/10 pain but expect to improve  Pt will improve AROM in L knee extension to at least 0 deg to reach terminal stance for ambulation.     12/19: progressing -- -3 deg from full extension    1/20: MET   Pt will improved AROM in L knee flexion to >115 deg to be able to sit to low surfaces. 12/19: progressing -- 103 deg knee flexion   1/20:MET   Pt will be able to complete sit to stand with BLE with good mechanics to improve ability to transfer throughout the day. 12/19: MET   Pt will have minimal swelling in the LLE showing healing and allowing for ROM to improve. 12/19: progressing   1/20: MET   Pt will decrease functional limitation per KOOS JR  to <50% in order to demonstrate improved functional tolerances at PLOF with minimal restriction/dysfunction   12/19: will assess in later session. 1/20: MET   LTG: (to be met in 16 treatments)   Pt will increase strength of all major muscle groups of the LEs to 5/5 or greater demonstrating improved strength needed to maximize safety and efficiency with mobility tasks such as gait, transfers and stairs. 1/20: MET   Pt will be able to ambulate without AD with normalized gait mechanics to allow him to return to PLOF    1/20 ;MET   Pt will decrease functional limitation per KOOS JR  to <40% in order to demonstrate improved functional tolerances at PLOF with minimal restriction/dysfunction   1/20:MET   Pt will demonstrate independence with a long term HEP for continued progress/maintenance after completion of PT    1/20: MET     Pt. Education:  [x] Yes  [] No  [x] Reviewed Prior HEP/Ed  Method of Education: [x] Verbal  [x] Demo  [] Written  Comprehension of Education:   [x] Verbalizes understanding. [x] Demonstrates understanding. [] Needs review. [x] Demonstrates/verbalizes HEP/Ed previously given. Home exercise Program   EXERCISES        Glute squeezes* 10x3     Quad sets* 10x3 Hold 3s   Supine hipd ABD assisted* 10x 3     Seated heel slide assisted * 10x3 Hold 5s    Step Hamstring Stretch 3x30\"    TKE's 20x Green   Calf stretching      Standing 4 way hip     Step downs           Plan: [] Continue per plan of care.    [x] Other: Discharge to HEP        Treatment Charges: Mins Units   []  Modalities     [x]  Ther Exercise 40 3   []  Manual Therapy     []  Ther Activities     []  Aquatics     []  Neuromuscular     [] Vasocompression     [] Gait Training     [] Dry needling        [] 1 or 2 muscles        [] 3 or more muscles     []  Other     Total Treatment time 40 3     Time In: 9:30 am            Time Out: 10:10 am    Electronically signed by:  [place] [facility]

## 2023-05-01 ENCOUNTER — HOSPITAL ENCOUNTER (OUTPATIENT)
Age: 68
Discharge: HOME | End: 2023-05-01
Payer: COMMERCIAL

## 2023-05-01 DIAGNOSIS — D69.6 THROMBOCYTOPENIA (HCC): ICD-10-CM

## 2023-05-01 DIAGNOSIS — E83.110 HEREDITARY HEMOCHROMATOSIS (HCC): ICD-10-CM

## 2023-05-01 LAB
ABSOLUTE EOS #: 0.11 K/UL (ref 0–0.4)
ABSOLUTE LYMPH #: 2.57 K/UL (ref 1–4.8)
ABSOLUTE MONO #: 0.57 K/UL (ref 0.1–1.3)
BASOPHILS # BLD: 1 % (ref 0–2)
BASOPHILS ABSOLUTE: 0.06 K/UL (ref 0–0.2)
EOSINOPHILS RELATIVE PERCENT: 2 % (ref 0–4)
FERRITIN SERPL-MCNC: 299 NG/ML (ref 30–400)
HCT VFR BLD AUTO: 40.3 % (ref 41–53)
HGB BLD-MCNC: 13.8 G/DL (ref 13.5–17.5)
LYMPHOCYTES # BLD: 45 % (ref 24–44)
MCH RBC QN AUTO: 30.9 PG (ref 26–34)
MCHC RBC AUTO-ENTMCNC: 34.4 G/DL (ref 31–37)
MCV RBC AUTO: 89.9 FL (ref 80–100)
MONOCYTES # BLD: 10 % (ref 1–7)
MORPHOLOGY: NORMAL
PDW BLD-RTO: 13.2 % (ref 11.5–14.9)
PLATELET # BLD AUTO: 131 K/UL (ref 150–450)
PMV BLD AUTO: 10.5 FL (ref 6–12)
RBC # BLD: 4.48 M/UL (ref 4.5–5.9)
SEG NEUTROPHILS: 42 % (ref 36–66)
SEGMENTED NEUTROPHILS ABSOLUTE COUNT: 2.39 K/UL (ref 1.3–9.1)
WBC # BLD AUTO: 5.7 K/UL (ref 3.5–11)

## 2023-05-01 PROCEDURE — 82728 ASSAY OF FERRITIN: CPT

## 2023-05-01 PROCEDURE — 85025 COMPLETE CBC W/AUTO DIFF WBC: CPT

## 2023-05-01 PROCEDURE — 36415 COLL VENOUS BLD VENIPUNCTURE: CPT

## 2023-05-08 ENCOUNTER — OFFICE VISIT (OUTPATIENT)
Dept: FAMILY MEDICINE CLINIC | Age: 68
End: 2023-05-08
Payer: COMMERCIAL

## 2023-05-08 VITALS
SYSTOLIC BLOOD PRESSURE: 122 MMHG | WEIGHT: 265.5 LBS | OXYGEN SATURATION: 95 % | DIASTOLIC BLOOD PRESSURE: 84 MMHG | TEMPERATURE: 97.6 F | BODY MASS INDEX: 36.01 KG/M2 | HEART RATE: 81 BPM

## 2023-05-08 DIAGNOSIS — E83.110 HEREDITARY HEMOCHROMATOSIS (HCC): ICD-10-CM

## 2023-05-08 DIAGNOSIS — G89.29 CHRONIC BILATERAL LOW BACK PAIN WITH LEFT-SIDED SCIATICA: ICD-10-CM

## 2023-05-08 DIAGNOSIS — M25.552 LEFT HIP PAIN: ICD-10-CM

## 2023-05-08 DIAGNOSIS — R60.0 BILATERAL LOWER EXTREMITY EDEMA: ICD-10-CM

## 2023-05-08 DIAGNOSIS — M54.42 CHRONIC BILATERAL LOW BACK PAIN WITH LEFT-SIDED SCIATICA: ICD-10-CM

## 2023-05-08 DIAGNOSIS — D69.6 THROMBOCYTOPENIA (HCC): ICD-10-CM

## 2023-05-08 DIAGNOSIS — E78.2 MIXED HYPERLIPIDEMIA: ICD-10-CM

## 2023-05-08 DIAGNOSIS — E11.9 CONTROLLED TYPE 2 DIABETES MELLITUS WITHOUT COMPLICATION, WITHOUT LONG-TERM CURRENT USE OF INSULIN (HCC): Primary | ICD-10-CM

## 2023-05-08 DIAGNOSIS — I10 PRIMARY HYPERTENSION: ICD-10-CM

## 2023-05-08 PROCEDURE — 3044F HG A1C LEVEL LT 7.0%: CPT | Performed by: NURSE PRACTITIONER

## 2023-05-08 PROCEDURE — 1123F ACP DISCUSS/DSCN MKR DOCD: CPT | Performed by: NURSE PRACTITIONER

## 2023-05-08 PROCEDURE — 3074F SYST BP LT 130 MM HG: CPT | Performed by: NURSE PRACTITIONER

## 2023-05-08 PROCEDURE — 3079F DIAST BP 80-89 MM HG: CPT | Performed by: NURSE PRACTITIONER

## 2023-05-08 PROCEDURE — 99214 OFFICE O/P EST MOD 30 MIN: CPT | Performed by: NURSE PRACTITIONER

## 2023-05-08 SDOH — ECONOMIC STABILITY: FOOD INSECURITY: WITHIN THE PAST 12 MONTHS, THE FOOD YOU BOUGHT JUST DIDN'T LAST AND YOU DIDN'T HAVE MONEY TO GET MORE.: NEVER TRUE

## 2023-05-08 SDOH — ECONOMIC STABILITY: FOOD INSECURITY: WITHIN THE PAST 12 MONTHS, YOU WORRIED THAT YOUR FOOD WOULD RUN OUT BEFORE YOU GOT MONEY TO BUY MORE.: NEVER TRUE

## 2023-05-08 SDOH — ECONOMIC STABILITY: INCOME INSECURITY: HOW HARD IS IT FOR YOU TO PAY FOR THE VERY BASICS LIKE FOOD, HOUSING, MEDICAL CARE, AND HEATING?: NOT HARD AT ALL

## 2023-05-08 SDOH — ECONOMIC STABILITY: HOUSING INSECURITY
IN THE LAST 12 MONTHS, WAS THERE A TIME WHEN YOU DID NOT HAVE A STEADY PLACE TO SLEEP OR SLEPT IN A SHELTER (INCLUDING NOW)?: NO

## 2023-05-08 ASSESSMENT — ENCOUNTER SYMPTOMS
BACK PAIN: 1
DIARRHEA: 0
EYES NEGATIVE: 1
SHORTNESS OF BREATH: 0
CHEST TIGHTNESS: 0
ALLERGIC/IMMUNOLOGIC NEGATIVE: 1
NAUSEA: 0
COUGH: 0
WHEEZING: 0
RESPIRATORY NEGATIVE: 1
VOMITING: 0
COLOR CHANGE: 0
GASTROINTESTINAL NEGATIVE: 1

## 2023-05-08 NOTE — PROGRESS NOTES
[facility]  [address]  Dept: [phone]    Polly Hugo is a 78 y.o. male who presents today for his medical conditions/complaintsas noted below. Polly Hugo is here today c/o Diabetes, Hypertension, and Hyperlipidemia    Past Medical History:   Diagnosis Date    Deep vein thrombosis (HCC)     x 2    Diabetes type 2, controlled ([address]     [place] filter in place     Hyperlipidemia     Hypertension     Obesity     Unspecified sleep apnea       Past Surgical History:   Procedure Laterality Date    COLONOSCOPY  15    KNEE ARTHROSCOPY Left     TONSILLECTOMY      VENA CAVA FILTER PLACEMENT      [place] Filter       Family History   Problem Relation Age of Onset    Diabetes Mother     Cancer Mother         Blood disorder     Cancer Father 79        Prostate cancer       Social History     Tobacco Use    Smoking status: Former     Packs/day: 2.50     Years: 20.00     Pack years: 50.00     Types: Cigarettes     Quit date: 2007     Years since quittin.2    Smokeless tobacco: Never   Substance Use Topics    Alcohol use:  Yes     Alcohol/week: 3.3 - 4.2 standard drinks     Types: 4 - 5 Standard drinks or equivalent per week     Comment: occasional / weekends       Current Outpatient Medications   Medication Sig Dispense Refill    rosuvastatin (CRESTOR) 5 MG tablet TAKE 1 TABLET BY MOUTH DAILY 90 tablet 1    metFORMIN (GLUCOPHAGE) 500 MG tablet Take 1 tablet by mouth daily 90 tablet 1    amLODIPine (NORVASC) 10 MG tablet Take 1 tablet by mouth daily 90 tablet 1    lisinopril-hydroCHLOROthiazide (PRINZIDE;ZESTORETIC) 20-12.5 MG per tablet Take 1 tablet by mouth daily 90 tablet 1    apixaban (ELIQUIS) 5 MG TABS tablet Take 1 tablet by mouth 2 times daily 180 tablet 1    vitamin D 25 MCG (1000 UT) CAPS Take 2,000 Units by mouth 2 times daily (with meals)      Cholecalciferol (VITAMIN D3) 25 MCG (1000 UT) CAPS TAKE 2 CAPSULES BY MOUTH IN THE MORNING

## 2023-05-11 DIAGNOSIS — E78.2 MIXED HYPERLIPIDEMIA: ICD-10-CM

## 2023-05-11 RX ORDER — ROSUVASTATIN CALCIUM 5 MG/1
TABLET, COATED ORAL
Qty: 90 TABLET | Refills: 1 | Status: SHIPPED | OUTPATIENT
Start: 2023-05-11

## 2023-05-11 NOTE — TELEPHONE ENCOUNTER
Luli Bertrand is calling to request a refill on the following medication(s):    Medication Request:  Requested Prescriptions     Pending Prescriptions Disp Refills    rosuvastatin (CRESTOR) 5 MG tablet [Pharmacy Med Name: ROSUVASTATIN 5MG TABLETS] 90 tablet 1     Sig: TAKE 1 TABLET BY MOUTH DAILY       Last Visit Date (If Applicable):  4/0/0700    Next Visit Date:    9/11/2023

## 2023-05-15 ENCOUNTER — HOSPITAL ENCOUNTER (OUTPATIENT)
Age: 68
Discharge: HOME | End: 2023-05-15
Payer: COMMERCIAL

## 2023-05-15 DIAGNOSIS — I10 PRIMARY HYPERTENSION: ICD-10-CM

## 2023-05-15 DIAGNOSIS — E78.2 MIXED HYPERLIPIDEMIA: ICD-10-CM

## 2023-05-15 DIAGNOSIS — E11.9 CONTROLLED TYPE 2 DIABETES MELLITUS WITHOUT COMPLICATION, WITHOUT LONG-TERM CURRENT USE OF INSULIN (HCC): ICD-10-CM

## 2023-05-15 DIAGNOSIS — R60.0 BILATERAL LOWER EXTREMITY EDEMA: ICD-10-CM

## 2023-05-15 LAB
ALBUMIN SERPL-MCNC: 4.3 G/DL (ref 3.5–5.2)
ALP SERPL-CCNC: 73 U/L (ref 40–129)
ALT SERPL-CCNC: 17 U/L (ref 5–41)
ANION GAP SERPL CALCULATED.3IONS-SCNC: 12 MMOL/L (ref 9–17)
AST SERPL-CCNC: 22 U/L
BILIRUB SERPL-MCNC: 0.4 MG/DL (ref 0.3–1.2)
BNP SERPL-MCNC: <36 PG/ML
BUN SERPL-MCNC: 25 MG/DL (ref 8–23)
CALCIUM SERPL-MCNC: 9.4 MG/DL (ref 8.6–10.4)
CHLORIDE SERPL-SCNC: 103 MMOL/L (ref 98–107)
CHOLEST SERPL-MCNC: 160 MG/DL
CHOLESTEROL/HDL RATIO: 3.6
CO2 SERPL-SCNC: 27 MMOL/L (ref 20–31)
CREAT SERPL-MCNC: 1.1 MG/DL (ref 0.7–1.2)
CREAT UR-MCNC: 107 MG/DL (ref 39–259)
EST. AVERAGE GLUCOSE BLD GHB EST-MCNC: 143 MG/DL
GFR SERPL CREATININE-BSD FRML MDRD: >60 ML/MIN/1.73M2
GLUCOSE SERPL-MCNC: 124 MG/DL (ref 70–99)
HBA1C MFR BLD: 6.6 % (ref 4–6)
HDLC SERPL-MCNC: 45 MG/DL
LDLC SERPL CALC-MCNC: 88 MG/DL (ref 0–130)
MICROALBUMIN UR-MCNC: <12 MG/L
MICROALBUMIN/CREAT UR-RTO: NORMAL MCG/MG CREAT
POTASSIUM SERPL-SCNC: 4.5 MMOL/L (ref 3.7–5.3)
PROT SERPL-MCNC: 7.3 G/DL (ref 6.4–8.3)
SODIUM SERPL-SCNC: 142 MMOL/L (ref 135–144)
TRIGL SERPL-MCNC: 134 MG/DL
TSH SERPL-ACNC: 0.95 UIU/ML (ref 0.3–5)

## 2023-05-15 PROCEDURE — 80061 LIPID PANEL: CPT

## 2023-05-15 PROCEDURE — 83880 ASSAY OF NATRIURETIC PEPTIDE: CPT

## 2023-05-15 PROCEDURE — 83036 HEMOGLOBIN GLYCOSYLATED A1C: CPT

## 2023-05-15 PROCEDURE — 84443 ASSAY THYROID STIM HORMONE: CPT

## 2023-05-15 PROCEDURE — 82570 ASSAY OF URINE CREATININE: CPT

## 2023-05-15 PROCEDURE — 82043 UR ALBUMIN QUANTITATIVE: CPT

## 2023-05-15 PROCEDURE — 36415 COLL VENOUS BLD VENIPUNCTURE: CPT

## 2023-05-15 PROCEDURE — 80053 COMPREHEN METABOLIC PANEL: CPT

## 2023-05-16 DIAGNOSIS — E83.110 HEREDITARY HEMOCHROMATOSIS (HCC): Primary | ICD-10-CM

## 2023-05-19 ENCOUNTER — HOSPITAL ENCOUNTER (OUTPATIENT)
Age: 68
Discharge: HOME | End: 2023-05-19
Payer: COMMERCIAL

## 2023-05-19 DIAGNOSIS — E83.110 HEREDITARY HEMOCHROMATOSIS (HCC): ICD-10-CM

## 2023-05-19 LAB
BASOPHILS # BLD: 0 K/UL (ref 0–0.2)
BASOPHILS NFR BLD: 1 % (ref 0–2)
EOSINOPHIL # BLD: 0.2 K/UL (ref 0–0.4)
EOSINOPHILS RELATIVE PERCENT: 3 % (ref 0–4)
ERYTHROCYTE [DISTWIDTH] IN BLOOD BY AUTOMATED COUNT: 13.3 % (ref 11.5–14.9)
HCT VFR BLD AUTO: 39.7 % (ref 41–53)
HGB BLD-MCNC: 13.4 G/DL (ref 13.5–17.5)
LYMPHOCYTES # BLD: 43 % (ref 24–44)
LYMPHOCYTES NFR BLD: 2.5 K/UL (ref 1–4.8)
MCH RBC QN AUTO: 30.7 PG (ref 26–34)
MCHC RBC AUTO-ENTMCNC: 33.6 G/DL (ref 31–37)
MCV RBC AUTO: 91.4 FL (ref 80–100)
MONOCYTES NFR BLD: 0.5 K/UL (ref 0.1–1.3)
MONOCYTES NFR BLD: 9 % (ref 1–7)
NEUTROPHILS NFR BLD: 44 % (ref 36–66)
NEUTS SEG NFR BLD: 2.6 K/UL (ref 1.3–9.1)
PLATELET # BLD AUTO: 131 K/UL (ref 150–450)
PMV BLD AUTO: 12.1 FL (ref 6–12)
RBC # BLD AUTO: 4.34 M/UL (ref 4.5–5.9)
WBC OTHER # BLD: 5.8 K/UL (ref 3.5–11)

## 2023-05-19 PROCEDURE — 85025 COMPLETE CBC W/AUTO DIFF WBC: CPT

## 2023-05-19 PROCEDURE — 36415 COLL VENOUS BLD VENIPUNCTURE: CPT

## 2023-05-22 ENCOUNTER — HOSPITAL ENCOUNTER (OUTPATIENT)
Dept: INFUSION THERAPY | Age: 68
Discharge: HOME | End: 2023-05-22
Payer: COMMERCIAL

## 2023-05-22 VITALS
SYSTOLIC BLOOD PRESSURE: 144 MMHG | HEART RATE: 69 BPM | DIASTOLIC BLOOD PRESSURE: 87 MMHG | RESPIRATION RATE: 16 BRPM | TEMPERATURE: 98 F

## 2023-05-22 DIAGNOSIS — E83.110 HEREDITARY HEMOCHROMATOSIS (HCC): Primary | ICD-10-CM

## 2023-05-22 LAB
REASON FOR REJECTION: NORMAL
SPECIMEN SOURCE: NORMAL
ZZ NTE CLEAN UP: ORDERED TEST: NORMAL

## 2023-05-22 PROCEDURE — 99195 PHLEBOTOMY: CPT

## 2023-05-22 NOTE — PROGRESS NOTES
Patient arrived for therapeutic phlebotomy. 500 ml blood removed and patient left in stable condition.  Returns 6/5 for

## 2023-06-05 ENCOUNTER — HOSPITAL ENCOUNTER (OUTPATIENT)
Age: 68
Discharge: HOME | End: 2023-06-05
Payer: COMMERCIAL

## 2023-06-05 ENCOUNTER — OFFICE VISIT (OUTPATIENT)
Dept: ONCOLOGY | Age: 68
End: 2023-06-05
Payer: COMMERCIAL

## 2023-06-05 ENCOUNTER — TELEPHONE (OUTPATIENT)
Dept: ONCOLOGY | Age: 68
End: 2023-06-05

## 2023-06-05 VITALS
RESPIRATION RATE: 16 BRPM | BODY MASS INDEX: 35.67 KG/M2 | TEMPERATURE: 97.2 F | HEART RATE: 69 BPM | WEIGHT: 263 LBS | OXYGEN SATURATION: 98 % | SYSTOLIC BLOOD PRESSURE: 131 MMHG | DIASTOLIC BLOOD PRESSURE: 77 MMHG

## 2023-06-05 DIAGNOSIS — E83.110 HEREDITARY HEMOCHROMATOSIS (HCC): Primary | ICD-10-CM

## 2023-06-05 DIAGNOSIS — E83.110 HEREDITARY HEMOCHROMATOSIS (HCC): ICD-10-CM

## 2023-06-05 DIAGNOSIS — D69.6 THROMBOCYTOPENIA (HCC): ICD-10-CM

## 2023-06-05 LAB
BASOPHILS # BLD: 0.01 K/UL (ref 0–0.2)
BASOPHILS NFR BLD: 0 % (ref 0–2)
EOSINOPHIL # BLD: 0.12 K/UL (ref 0–0.4)
EOSINOPHILS RELATIVE PERCENT: 3 % (ref 1–4)
ERYTHROCYTE [DISTWIDTH] IN BLOOD BY AUTOMATED COUNT: 12.9 % (ref 12.5–15.4)
FERRITIN SERPL-MCNC: 221 NG/ML (ref 30–400)
HCT VFR BLD AUTO: 41.3 % (ref 41–53)
HGB BLD-MCNC: 13.5 G/DL (ref 13.5–17.5)
LYMPHOCYTES # BLD: 46 % (ref 24–44)
LYMPHOCYTES NFR BLD: 2.26 K/UL (ref 1–4.8)
MCH RBC QN AUTO: 30.7 PG (ref 26–34)
MCHC RBC AUTO-ENTMCNC: 32.7 G/DL (ref 31–37)
MCV RBC AUTO: 93.9 FL (ref 80–100)
MONOCYTES NFR BLD: 0.32 K/UL (ref 0.1–1.2)
MONOCYTES NFR BLD: 7 % (ref 2–11)
NEUTROPHILS NFR BLD: 44 % (ref 36–66)
NEUTS SEG NFR BLD: 2.09 K/UL (ref 1.8–7.7)
PLATELET # BLD AUTO: 127 K/UL (ref 140–450)
PMV BLD AUTO: 12.5 FL (ref 8–14)
RBC # BLD AUTO: 4.4 M/UL (ref 4.5–5.9)
WBC OTHER # BLD: 4.8 K/UL (ref 3.5–11)

## 2023-06-05 PROCEDURE — 3078F DIAST BP <80 MM HG: CPT | Performed by: INTERNAL MEDICINE

## 2023-06-05 PROCEDURE — 85027 COMPLETE CBC AUTOMATED: CPT

## 2023-06-05 PROCEDURE — 99211 OFF/OP EST MAY X REQ PHY/QHP: CPT | Performed by: INTERNAL MEDICINE

## 2023-06-05 PROCEDURE — 3074F SYST BP LT 130 MM HG: CPT | Performed by: INTERNAL MEDICINE

## 2023-06-05 PROCEDURE — 82728 ASSAY OF FERRITIN: CPT

## 2023-06-05 PROCEDURE — 1123F ACP DISCUSS/DSCN MKR DOCD: CPT | Performed by: INTERNAL MEDICINE

## 2023-06-05 PROCEDURE — 36415 COLL VENOUS BLD VENIPUNCTURE: CPT

## 2023-06-05 PROCEDURE — 99214 OFFICE O/P EST MOD 30 MIN: CPT | Performed by: INTERNAL MEDICINE

## 2023-06-05 NOTE — TELEPHONE ENCOUNTER
AVS from 6/5/23    No phlebotomy needed today  RV 6 months with CBC, ferritin before RV  Possible Therapeutic phlebotomy at RV         *no phleb today  *rv is sched for [email or web address] w/possible phleb to follow    PT was given AVS,orders and appt schedule

## 2023-06-05 NOTE — PATIENT INSTRUCTIONS
No phlebotomy needed today  RV 6 months with CBC, ferritin before RV  Possible Therapeutic phlebotomy at RV

## 2023-07-21 DIAGNOSIS — Z86.718 HISTORY OF RECURRENT DEEP VEIN THROMBOSIS (DVT): ICD-10-CM

## 2023-07-24 RX ORDER — APIXABAN 5 MG/1
TABLET, FILM COATED ORAL
Qty: 180 TABLET | Refills: 1 | Status: SHIPPED | OUTPATIENT
Start: 2023-07-24

## 2023-07-24 NOTE — TELEPHONE ENCOUNTER
Leonie Eng is calling to request a refill on the following medication(s):    Medication Request:  Requested Prescriptions     Pending Prescriptions Disp Refills    ELIQUIS 5 MG TABS tablet [Pharmacy Med Name: ELIQUIS 5MG TABLETS] 180 tablet 1     Sig: TAKE 1 TABLET BY MOUTH TWICE DAILY       Last Visit Date (If Applicable):  4/3/1467    Next Visit Date:    9/11/2023

## 2023-08-05 DIAGNOSIS — I10 BENIGN ESSENTIAL HYPERTENSION: ICD-10-CM

## 2023-08-05 DIAGNOSIS — E11.9 CONTROLLED TYPE 2 DIABETES MELLITUS WITHOUT COMPLICATION, WITHOUT LONG-TERM CURRENT USE OF INSULIN (HCC): ICD-10-CM

## 2023-08-07 RX ORDER — AMLODIPINE BESYLATE 10 MG/1
10 TABLET ORAL DAILY
Qty: 90 TABLET | Refills: 1 | Status: SHIPPED | OUTPATIENT
Start: 2023-08-07

## 2023-08-07 RX ORDER — LISINOPRIL AND HYDROCHLOROTHIAZIDE 20; 12.5 MG/1; MG/1
1 TABLET ORAL DAILY
Qty: 90 TABLET | Refills: 1 | Status: SHIPPED | OUTPATIENT
Start: 2023-08-07

## 2023-08-07 NOTE — TELEPHONE ENCOUNTER
Julien Thornton is calling to request a refill on the following medication(s):    Medication Request:  Requested Prescriptions     Pending Prescriptions Disp Refills    metFORMIN (GLUCOPHAGE) 500 MG tablet [Pharmacy Med Name: METFORMIN 500MG TABLETS] 90 tablet 1     Sig: TAKE 1 TABLET BY MOUTH DAILY    lisinopril-hydroCHLOROthiazide (PRINZIDE;ZESTORETIC) 20-12.5 MG per tablet [Pharmacy Med Name: LISINOPRIL-HCTZ 20/12.5MG TABLETS] 90 tablet 1     Sig: TAKE 1 TABLET BY MOUTH DAILY    amLODIPine (NORVASC) 10 MG tablet [Pharmacy Med Name: AMLODIPINE BESYLATE 10MG TABLETS] 90 tablet 1     Sig: TAKE 1 TABLET BY MOUTH DAILY       Last Visit Date (If Applicable):  1/5/3544    Next Visit Date:    9/11/2023

## 2023-08-31 ENCOUNTER — TELEPHONE (OUTPATIENT)
Dept: FAMILY MEDICINE CLINIC | Age: 68
End: 2023-08-31

## 2023-08-31 NOTE — TELEPHONE ENCOUNTER
Diarrhea can also be a sx of COVID, it should resolve. I would recommend that he stays well hydrated. He can see me for an appointment if things don't improve.  I would not take anti-diarrheal at this time

## 2023-08-31 NOTE — TELEPHONE ENCOUNTER
Patient had covid last week has a dry cough now   Please advise    [place]    Please advise    Gómez Denson

## 2023-08-31 NOTE — TELEPHONE ENCOUNTER
The cough is from [facility], he can treat with throat lozenges, Mucinex DM, Delsym , etc.  The cough can take several weeks to resolve  Is there a question ?

## 2023-08-31 NOTE — TELEPHONE ENCOUNTER
Pt has very loose stool is on blood thinner wife would like to know what can he take for this he is still working please advise

## 2023-09-11 ENCOUNTER — OFFICE VISIT (OUTPATIENT)
Dept: FAMILY MEDICINE CLINIC | Age: 68
End: 2023-09-11
Payer: COMMERCIAL

## 2023-09-11 VITALS
TEMPERATURE: 97.8 F | HEART RATE: 78 BPM | HEIGHT: 72 IN | BODY MASS INDEX: 35.84 KG/M2 | DIASTOLIC BLOOD PRESSURE: 72 MMHG | OXYGEN SATURATION: 98 % | SYSTOLIC BLOOD PRESSURE: 122 MMHG | WEIGHT: 264.6 LBS

## 2023-09-11 DIAGNOSIS — R05.2 SUBACUTE COUGH: ICD-10-CM

## 2023-09-11 DIAGNOSIS — E11.9 CONTROLLED TYPE 2 DIABETES MELLITUS WITHOUT COMPLICATION, WITHOUT LONG-TERM CURRENT USE OF INSULIN (HCC): Primary | ICD-10-CM

## 2023-09-11 DIAGNOSIS — E78.2 MIXED HYPERLIPIDEMIA: ICD-10-CM

## 2023-09-11 DIAGNOSIS — I10 BENIGN ESSENTIAL HYPERTENSION: ICD-10-CM

## 2023-09-11 DIAGNOSIS — Z86.16 HISTORY OF COVID-19: ICD-10-CM

## 2023-09-11 LAB — HBA1C MFR BLD: 6.9 %

## 2023-09-11 PROCEDURE — 83036 HEMOGLOBIN GLYCOSYLATED A1C: CPT | Performed by: NURSE PRACTITIONER

## 2023-09-11 PROCEDURE — 3044F HG A1C LEVEL LT 7.0%: CPT | Performed by: NURSE PRACTITIONER

## 2023-09-11 PROCEDURE — 1123F ACP DISCUSS/DSCN MKR DOCD: CPT | Performed by: NURSE PRACTITIONER

## 2023-09-11 PROCEDURE — 3078F DIAST BP <80 MM HG: CPT | Performed by: NURSE PRACTITIONER

## 2023-09-11 PROCEDURE — 99214 OFFICE O/P EST MOD 30 MIN: CPT | Performed by: NURSE PRACTITIONER

## 2023-09-11 PROCEDURE — 3074F SYST BP LT 130 MM HG: CPT | Performed by: NURSE PRACTITIONER

## 2023-09-11 ASSESSMENT — ENCOUNTER SYMPTOMS
CHEST TIGHTNESS: 0
COLOR CHANGE: 0
VOMITING: 0
COUGH: 1
ALLERGIC/IMMUNOLOGIC NEGATIVE: 1
GASTROINTESTINAL NEGATIVE: 1
EYES NEGATIVE: 1
DIARRHEA: 0
NAUSEA: 0
SHORTNESS OF BREATH: 0
WHEEZING: 0

## 2023-09-11 ASSESSMENT — PATIENT HEALTH QUESTIONNAIRE - PHQ9
SUM OF ALL RESPONSES TO PHQ QUESTIONS 1-9: 1
1. LITTLE INTEREST OR PLEASURE IN DOING THINGS: 1
SUM OF ALL RESPONSES TO PHQ9 QUESTIONS 1 & 2: 1
2. FEELING DOWN, DEPRESSED OR HOPELESS: 0

## 2023-09-11 NOTE — PROGRESS NOTES
[facility]  [address] [place] [address]., [facility]  Dept: [phone]    Margaret Engle is a 78 y.o. male who presents today for his medical conditions/complaintsas noted below. Margaret Engle is here today c/o Diabetes, Hypertension, and Hyperlipidemia    Past Medical History:   Diagnosis Date    Deep vein thrombosis (HCC)     x 2    Diabetes type 2, controlled ([address])     [place] filter in place     Hyperlipidemia     Hypertension     Obesity     Unspecified sleep apnea       Past Surgical History:   Procedure Laterality Date    COLONOSCOPY  15    KNEE ARTHROSCOPY Left     TONSILLECTOMY      VENA CAVA FILTER PLACEMENT      [place] Filter       Family History   Problem Relation Age of Onset    Diabetes Mother     Cancer Mother         Blood disorder     Cancer Father 79        Prostate cancer       Social History     Tobacco Use    Smoking status: Former     Packs/day: 2.50     Years: 20.00     Additional pack years: 0.00     Total pack years: 50.00     Types: Cigarettes     Quit date: 2007     Years since quittin.5    Smokeless tobacco: Never   Substance Use Topics    Alcohol use: Yes     Alcohol/week: 3.3 - 4.2 standard drinks of alcohol     Types: 4 - 5 Standard drinks or equivalent per week     Comment: occasional / weekends       Current Outpatient Medications   Medication Sig Dispense Refill    metFORMIN (GLUCOPHAGE) 500 MG tablet TAKE 1 TABLET BY MOUTH DAILY 90 tablet 1    lisinopril-hydroCHLOROthiazide (PRINZIDE;ZESTORETIC) 20-12.5 MG per tablet TAKE 1 TABLET BY MOUTH DAILY 90 tablet 1    amLODIPine (NORVASC) 10 MG tablet TAKE 1 TABLET BY MOUTH DAILY 90 tablet 1    ELIQUIS 5 MG TABS tablet TAKE 1 TABLET BY MOUTH TWICE DAILY 180 tablet 1    rosuvastatin (CRESTOR) 5 MG tablet TAKE 1 TABLET BY MOUTH DAILY 90 tablet 1     No current facility-administered medications for this visit.      No Known Allergies      HPI:     HPI    Presents today c/o DM2 / HTN / HLD

## 2023-09-18 ENCOUNTER — HOSPITAL ENCOUNTER (OUTPATIENT)
Age: 68
Discharge: HOME | End: 2023-09-20
Payer: COMMERCIAL

## 2023-09-18 ENCOUNTER — HOSPITAL ENCOUNTER (OUTPATIENT)
Dept: GENERAL RADIOLOGY | Age: 68
Discharge: HOME | End: 2023-09-20
Payer: COMMERCIAL

## 2023-09-18 DIAGNOSIS — R05.2 SUBACUTE COUGH: ICD-10-CM

## 2023-09-18 DIAGNOSIS — Z86.16 HISTORY OF COVID-19: ICD-10-CM

## 2023-09-18 PROCEDURE — 71046 X-RAY EXAM CHEST 2 VIEWS: CPT

## 2023-09-18 RX ORDER — DOXYCYCLINE 100 MG/1
100 CAPSULE ORAL 2 TIMES DAILY WITH MEALS
Qty: 14 CAPSULE | Refills: 0 | Status: SHIPPED | OUTPATIENT
Start: 2023-09-18 | End: 2023-09-25

## 2023-11-03 DIAGNOSIS — E78.2 MIXED HYPERLIPIDEMIA: ICD-10-CM

## 2023-11-03 RX ORDER — ROSUVASTATIN CALCIUM 5 MG/1
5 TABLET, COATED ORAL DAILY
Qty: 90 TABLET | Refills: 2 | Status: SHIPPED | OUTPATIENT
Start: 2023-11-03

## 2023-11-03 NOTE — TELEPHONE ENCOUNTER
Douglas Walters is calling to request a refill on the following medication(s):    Medication Request:  Requested Prescriptions     Pending Prescriptions Disp Refills    rosuvastatin (CRESTOR) 5 MG tablet [Pharmacy Med Name: ROSUVASTATIN 5MG TABLETS] 90 tablet 1     Sig: TAKE 1 TABLET BY MOUTH DAILY       Last Visit Date (If Applicable):  9/11/2023    Next Visit Date:    1/15/2024

## 2023-12-07 ENCOUNTER — HOSPITAL ENCOUNTER (OUTPATIENT)
Age: 68
Discharge: HOME | End: 2023-12-07
Payer: COMMERCIAL

## 2023-12-07 LAB
BASOPHILS # BLD: 0 K/UL (ref 0–0.2)
BASOPHILS NFR BLD: 1 % (ref 0–2)
EOSINOPHIL # BLD: 0.3 K/UL (ref 0–0.4)
EOSINOPHILS RELATIVE PERCENT: 4 % (ref 0–4)
ERYTHROCYTE [DISTWIDTH] IN BLOOD BY AUTOMATED COUNT: 12.9 % (ref 11.5–14.9)
FERRITIN SERPL-MCNC: 266 NG/ML (ref 30–400)
HCT VFR BLD AUTO: 41.5 % (ref 41–53)
HGB BLD-MCNC: 13.7 G/DL (ref 13.5–17.5)
LYMPHOCYTES NFR BLD: 2.1 K/UL (ref 1–4.8)
LYMPHOCYTES RELATIVE PERCENT: 36 % (ref 24–44)
MCH RBC QN AUTO: 30.8 PG (ref 26–34)
MCHC RBC AUTO-ENTMCNC: 32.9 G/DL (ref 31–37)
MCV RBC AUTO: 93.6 FL (ref 80–100)
MONOCYTES NFR BLD: 0.7 K/UL (ref 0.1–1.3)
MONOCYTES NFR BLD: 12 % (ref 1–7)
NEUTROPHILS NFR BLD: 47 % (ref 36–66)
NEUTS SEG NFR BLD: 2.8 K/UL (ref 1.3–9.1)
PLATELET # BLD AUTO: 145 K/UL (ref 150–450)
PMV BLD AUTO: 10.5 FL (ref 6–12)
RBC # BLD AUTO: 4.43 M/UL (ref 4.5–5.9)
WBC OTHER # BLD: 5.8 K/UL (ref 3.5–11)

## 2023-12-07 PROCEDURE — 36415 COLL VENOUS BLD VENIPUNCTURE: CPT

## 2023-12-07 PROCEDURE — 85025 COMPLETE CBC W/AUTO DIFF WBC: CPT

## 2023-12-07 PROCEDURE — 82728 ASSAY OF FERRITIN: CPT

## 2023-12-11 ENCOUNTER — HOSPITAL ENCOUNTER (OUTPATIENT)
Dept: INFUSION THERAPY | Age: 68
Discharge: HOME | End: 2023-12-11

## 2023-12-11 ENCOUNTER — OFFICE VISIT (OUTPATIENT)
Dept: ONCOLOGY | Age: 68
End: 2023-12-11
Payer: COMMERCIAL

## 2023-12-11 ENCOUNTER — TELEPHONE (OUTPATIENT)
Dept: ONCOLOGY | Age: 68
End: 2023-12-11

## 2023-12-11 VITALS
OXYGEN SATURATION: 97 % | HEART RATE: 71 BPM | WEIGHT: 267.7 LBS | RESPIRATION RATE: 18 BRPM | BODY MASS INDEX: 36.31 KG/M2 | DIASTOLIC BLOOD PRESSURE: 82 MMHG | TEMPERATURE: 97.8 F | SYSTOLIC BLOOD PRESSURE: 132 MMHG

## 2023-12-11 DIAGNOSIS — E83.110 HEREDITARY HEMOCHROMATOSIS (HCC): Primary | ICD-10-CM

## 2023-12-11 DIAGNOSIS — D69.6 THROMBOCYTOPENIA (HCC): ICD-10-CM

## 2023-12-11 PROCEDURE — 3079F DIAST BP 80-89 MM HG: CPT | Performed by: INTERNAL MEDICINE

## 2023-12-11 PROCEDURE — 99214 OFFICE O/P EST MOD 30 MIN: CPT | Performed by: INTERNAL MEDICINE

## 2023-12-11 PROCEDURE — 3075F SYST BP GE 130 - 139MM HG: CPT | Performed by: INTERNAL MEDICINE

## 2023-12-11 PROCEDURE — 99211 OFF/OP EST MAY X REQ PHY/QHP: CPT | Performed by: INTERNAL MEDICINE

## 2023-12-11 PROCEDURE — 1123F ACP DISCUSS/DSCN MKR DOCD: CPT | Performed by: INTERNAL MEDICINE

## 2023-12-11 NOTE — PROGRESS NOTES
05/15/2023 0833    CO2 27 05/15/2023 0833    BUN 25 (H) 05/15/2023 0833    CREATININE 1.10 05/15/2023 0833        Component Value Date/Time    CALCIUM 9.4 05/15/2023 0833    ALKPHOS 73 05/15/2023 0833    AST 22 05/15/2023 0833    ALT 17 05/15/2023 0833    BILITOT 0.4 05/15/2023 0833            IMPRESSION:   Recurrent bilateral DVTs and pulmonary embolism  Negative hypercoagulability work-up  Lifelong anticoagulation candidate  Hereditary hemochromatosis  Persistent mild thrombocytopenia      PLAN: Labs were reviewed and explained to the patient. Genetic testing for hemochromatosis was positive. I explained to the patient the nature of these abnormalities and management plans. Patient's ferritin level is still elevated. Platelets are stable. Discussed further management. We will make therapeutic phlebotomy every 6 months as needed. We will continue to monitor CBC and ferritin level. We will check his labs including iron studies in 6 months. Obviously he had recurrent episodes of DVTs and pulmonary embolism. Patient would be a candidate for lifelong anticoagulation despite negative hypercoagulability work-up due to the recurrent episodes of thrombosis. He will be continued on Eliquis. Tolerated well. Patient is having persistent mild thrombocytopenia with no contraindications for continued anticoagulation. Mild thrombocytopenia is probably related to liver and spleen. Possibly caused by hemochromatosis. Labs will be monitored. We will make further recommendations based on the results. Patient's questions were answered to the best of his satisfaction and he verbalized full understanding and agreement.

## 2023-12-11 NOTE — TELEPHONE ENCOUNTER
No phlebotomy needed today  RV 6 months with CBC, ferritin before RV  Possible Therapeutic phlebotomy at RV        NO PHLEBOTOMY PER MD 12/11/23    RV scheduled for 06/10/24 @ 10:45am with velvet phlebotomy to follow    PT was given AVS and appt schedule    Electronically signed by George Mart on 12/11/2023 at 11:15 AM

## 2024-01-05 DIAGNOSIS — Z86.718 HISTORY OF RECURRENT DEEP VEIN THROMBOSIS (DVT): ICD-10-CM

## 2024-01-05 RX ORDER — APIXABAN 5 MG/1
TABLET, FILM COATED ORAL
Qty: 180 TABLET | Refills: 1 | Status: SHIPPED | OUTPATIENT
Start: 2024-01-05

## 2024-01-05 NOTE — TELEPHONE ENCOUNTER
Douglas Walters is calling to request a refill on the following medication(s):    Medication Request:  Requested Prescriptions     Pending Prescriptions Disp Refills    ELIQUIS 5 MG TABS tablet [Pharmacy Med Name: ELIQUIS 5MG TABLETS] 180 tablet 1     Sig: TAKE 1 TABLET BY MOUTH TWICE DAILY       Last Visit Date (If Applicable):  09/11/2023    Next Visit Date:    01/15/2024

## 2024-01-15 ENCOUNTER — OFFICE VISIT (OUTPATIENT)
Dept: FAMILY MEDICINE CLINIC | Age: 69
End: 2024-01-15
Payer: COMMERCIAL

## 2024-01-15 VITALS
WEIGHT: 267.2 LBS | BODY MASS INDEX: 36.19 KG/M2 | TEMPERATURE: 98.1 F | DIASTOLIC BLOOD PRESSURE: 66 MMHG | SYSTOLIC BLOOD PRESSURE: 134 MMHG | HEART RATE: 72 BPM | OXYGEN SATURATION: 95 % | HEIGHT: 72 IN

## 2024-01-15 DIAGNOSIS — I10 BENIGN ESSENTIAL HYPERTENSION: ICD-10-CM

## 2024-01-15 DIAGNOSIS — J30.9 ALLERGIC RHINITIS, UNSPECIFIED SEASONALITY, UNSPECIFIED TRIGGER: ICD-10-CM

## 2024-01-15 DIAGNOSIS — D69.6 THROMBOCYTOPENIA (HCC): ICD-10-CM

## 2024-01-15 DIAGNOSIS — E83.110 HEREDITARY HEMOCHROMATOSIS (HCC): ICD-10-CM

## 2024-01-15 DIAGNOSIS — E11.9 CONTROLLED TYPE 2 DIABETES MELLITUS WITHOUT COMPLICATION, WITHOUT LONG-TERM CURRENT USE OF INSULIN (HCC): Primary | ICD-10-CM

## 2024-01-15 DIAGNOSIS — Z86.718 HISTORY OF RECURRENT DEEP VEIN THROMBOSIS (DVT): ICD-10-CM

## 2024-01-15 DIAGNOSIS — R09.81 SINUS CONGESTION: ICD-10-CM

## 2024-01-15 DIAGNOSIS — E78.2 MIXED HYPERLIPIDEMIA: ICD-10-CM

## 2024-01-15 LAB — HBA1C MFR BLD: 7.1 %

## 2024-01-15 PROCEDURE — 3075F SYST BP GE 130 - 139MM HG: CPT | Performed by: NURSE PRACTITIONER

## 2024-01-15 PROCEDURE — 83036 HEMOGLOBIN GLYCOSYLATED A1C: CPT | Performed by: NURSE PRACTITIONER

## 2024-01-15 PROCEDURE — 3051F HG A1C>EQUAL 7.0%<8.0%: CPT | Performed by: NURSE PRACTITIONER

## 2024-01-15 PROCEDURE — 99214 OFFICE O/P EST MOD 30 MIN: CPT | Performed by: NURSE PRACTITIONER

## 2024-01-15 PROCEDURE — 3078F DIAST BP <80 MM HG: CPT | Performed by: NURSE PRACTITIONER

## 2024-01-15 PROCEDURE — 1123F ACP DISCUSS/DSCN MKR DOCD: CPT | Performed by: NURSE PRACTITIONER

## 2024-01-15 RX ORDER — ROSUVASTATIN CALCIUM 5 MG/1
5 TABLET, COATED ORAL DAILY
Qty: 90 TABLET | Refills: 1 | Status: SHIPPED | OUTPATIENT
Start: 2024-01-15

## 2024-01-15 RX ORDER — LISINOPRIL AND HYDROCHLOROTHIAZIDE 20; 12.5 MG/1; MG/1
1 TABLET ORAL DAILY
Qty: 90 TABLET | Refills: 1 | Status: CANCELLED | OUTPATIENT
Start: 2024-01-15

## 2024-01-15 RX ORDER — LISINOPRIL AND HYDROCHLOROTHIAZIDE 20; 12.5 MG/1; MG/1
1 TABLET ORAL DAILY
Qty: 90 TABLET | Refills: 1 | Status: SHIPPED | OUTPATIENT
Start: 2024-01-15

## 2024-01-15 RX ORDER — ROSUVASTATIN CALCIUM 5 MG/1
5 TABLET, COATED ORAL DAILY
Qty: 90 TABLET | Refills: 1 | Status: CANCELLED | OUTPATIENT
Start: 2024-01-15

## 2024-01-15 RX ORDER — AMLODIPINE BESYLATE 10 MG/1
10 TABLET ORAL DAILY
Qty: 90 TABLET | Refills: 1 | Status: CANCELLED | OUTPATIENT
Start: 2024-01-15

## 2024-01-15 RX ORDER — AMLODIPINE BESYLATE 10 MG/1
10 TABLET ORAL DAILY
Qty: 90 TABLET | Refills: 1 | Status: SHIPPED | OUTPATIENT
Start: 2024-01-15

## 2024-01-15 ASSESSMENT — PATIENT HEALTH QUESTIONNAIRE - PHQ9
SUM OF ALL RESPONSES TO PHQ QUESTIONS 1-9: 0
SUM OF ALL RESPONSES TO PHQ9 QUESTIONS 1 & 2: 0
1. LITTLE INTEREST OR PLEASURE IN DOING THINGS: 0
2. FEELING DOWN, DEPRESSED OR HOPELESS: 0

## 2024-01-15 ASSESSMENT — ENCOUNTER SYMPTOMS
GASTROINTESTINAL NEGATIVE: 1
NAUSEA: 0
ALLERGIC/IMMUNOLOGIC NEGATIVE: 1
SHORTNESS OF BREATH: 0
CHEST TIGHTNESS: 0
EYE ITCHING: 0
WHEEZING: 0
SORE THROAT: 1
COUGH: 1
SINUS PRESSURE: 1
RHINORRHEA: 1
DIARRHEA: 0
CHOKING: 0
VOMITING: 0
EYE DISCHARGE: 1
COLOR CHANGE: 0

## 2024-01-15 NOTE — PROGRESS NOTES
[facility]  [address] [place] [address]  Dept: [phone]    Douglas Walters is a 68 y.o. male who presents today for his medical conditions/complaintsas noted below.      Douglas Walters is here today c/o Diabetes, Hypertension, Hyperlipidemia, and Medication Refill    Past Medical History:   Diagnosis Date    Deep vein thrombosis (HCC)     x 2    Diabetes type 2, controlled (HCC)     [place] filter in place     Hyperlipidemia     Hypertension     Obesity     Unspecified sleep apnea       Past Surgical History:   Procedure Laterality Date    COLONOSCOPY  14    KNEE ARTHROSCOPY Left     TONSILLECTOMY      VENA CAVA FILTER PLACEMENT      [place] Filter       Family History   Problem Relation Age of Onset    Diabetes Mother     Cancer Mother         Blood disorder     Cancer Father 70        Prostate cancer       Social History     Tobacco Use    Smoking status: Former     Current packs/day: 0.00     Average packs/day: 2.5 packs/day for 20.0 years (50.0 ttl pk-yrs)     Types: Cigarettes     Start date: 1987     Quit date: 2007     Years since quittin.9    Smokeless tobacco: Never   Substance Use Topics    Alcohol use: Yes     Alcohol/week: 3.3 - 4.2 standard drinks of alcohol     Types: 4 - 5 Standard drinks or equivalent per week     Comment: occasional / weekends       Current Outpatient Medications   Medication Sig Dispense Refill    ELIQUIS 5 MG TABS tablet TAKE 1 TABLET BY MOUTH TWICE DAILY 180 tablet 1    rosuvastatin (CRESTOR) 5 MG tablet Take 1 tablet by mouth daily TAKE 1 TABLET BY MOUTH DAILY 90 tablet 2    metFORMIN (GLUCOPHAGE) 500 MG tablet TAKE 1 TABLET BY MOUTH DAILY 90 tablet 1    lisinopril-hydroCHLOROthiazide (PRINZIDE;ZESTORETIC) 20-12.5 MG per tablet TAKE 1 TABLET BY MOUTH DAILY 90 tablet 1    amLODIPine (NORVASC) 10 MG tablet TAKE 1 TABLET BY MOUTH DAILY 90 tablet 1     No current facility-administered medications for this visit.     No

## 2024-04-19 ENCOUNTER — HOSPITAL ENCOUNTER (OUTPATIENT)
Age: 69
Discharge: HOME | End: 2024-04-19
Payer: COMMERCIAL

## 2024-04-19 DIAGNOSIS — E78.2 MIXED HYPERLIPIDEMIA: ICD-10-CM

## 2024-04-19 DIAGNOSIS — E83.110 HEREDITARY HEMOCHROMATOSIS (HCC): ICD-10-CM

## 2024-04-19 DIAGNOSIS — E11.9 CONTROLLED TYPE 2 DIABETES MELLITUS WITHOUT COMPLICATION, WITHOUT LONG-TERM CURRENT USE OF INSULIN (HCC): ICD-10-CM

## 2024-04-19 DIAGNOSIS — D69.6 THROMBOCYTOPENIA (HCC): ICD-10-CM

## 2024-04-19 DIAGNOSIS — I10 BENIGN ESSENTIAL HYPERTENSION: ICD-10-CM

## 2024-04-19 LAB
ALBUMIN SERPL-MCNC: 4.3 G/DL (ref 3.5–5.2)
ALP SERPL-CCNC: 78 U/L (ref 40–129)
ALT SERPL-CCNC: 17 U/L (ref 5–41)
ANION GAP SERPL CALCULATED.3IONS-SCNC: 11 MMOL/L (ref 9–17)
AST SERPL-CCNC: 22 U/L
BASOPHILS # BLD: 0 K/UL (ref 0–0.2)
BASOPHILS NFR BLD: 1 % (ref 0–2)
BILIRUB SERPL-MCNC: 0.3 MG/DL (ref 0.3–1.2)
BUN SERPL-MCNC: 27 MG/DL (ref 8–23)
CALCIUM SERPL-MCNC: 9 MG/DL (ref 8.6–10.4)
CHLORIDE SERPL-SCNC: 103 MMOL/L (ref 98–107)
CHOLEST SERPL-MCNC: 181 MG/DL
CHOLESTEROL/HDL RATIO: 3.9
CO2 SERPL-SCNC: 27 MMOL/L (ref 20–31)
CREAT SERPL-MCNC: 1.5 MG/DL (ref 0.7–1.2)
CREAT UR-MCNC: 185 MG/DL (ref 39–259)
EOSINOPHIL # BLD: 0.2 K/UL (ref 0–0.4)
EOSINOPHILS RELATIVE PERCENT: 3 % (ref 0–4)
ERYTHROCYTE [DISTWIDTH] IN BLOOD BY AUTOMATED COUNT: 13.5 % (ref 11.5–14.9)
EST. AVERAGE GLUCOSE BLD GHB EST-MCNC: 143 MG/DL
GFR SERPL CREATININE-BSD FRML MDRD: 51 ML/MIN/1.73M2
GLUCOSE SERPL-MCNC: 127 MG/DL (ref 70–99)
HBA1C MFR BLD: 6.6 % (ref 4–6)
HCT VFR BLD AUTO: 43.6 % (ref 41–53)
HDLC SERPL-MCNC: 46 MG/DL
HGB BLD-MCNC: 14.3 G/DL (ref 13.5–17.5)
LDLC SERPL CALC-MCNC: 103 MG/DL (ref 0–130)
LYMPHOCYTES NFR BLD: 2.1 K/UL (ref 1–4.8)
LYMPHOCYTES RELATIVE PERCENT: 38 % (ref 24–44)
MAGNESIUM SERPL-MCNC: 2.3 MG/DL (ref 1.6–2.6)
MCH RBC QN AUTO: 30.7 PG (ref 26–34)
MCHC RBC AUTO-ENTMCNC: 32.7 G/DL (ref 31–37)
MCV RBC AUTO: 93.9 FL (ref 80–100)
MICROALBUMIN UR-MCNC: 36 MG/L (ref 0–20)
MICROALBUMIN/CREAT UR-RTO: 20 MCG/MG CREAT (ref 0–17)
MONOCYTES NFR BLD: 0.6 K/UL (ref 0.1–1.3)
MONOCYTES NFR BLD: 10 % (ref 1–7)
NEUTROPHILS NFR BLD: 48 % (ref 36–66)
NEUTS SEG NFR BLD: 2.7 K/UL (ref 1.3–9.1)
PLATELET # BLD AUTO: 136 K/UL (ref 150–450)
PMV BLD AUTO: 9.9 FL (ref 6–12)
POTASSIUM SERPL-SCNC: 4.5 MMOL/L (ref 3.7–5.3)
PROT SERPL-MCNC: 7.5 G/DL (ref 6.4–8.3)
RBC # BLD AUTO: 4.64 M/UL (ref 4.5–5.9)
SODIUM SERPL-SCNC: 141 MMOL/L (ref 135–144)
TRIGL SERPL-MCNC: 161 MG/DL
WBC OTHER # BLD: 5.5 K/UL (ref 3.5–11)

## 2024-04-19 PROCEDURE — 83036 HEMOGLOBIN GLYCOSYLATED A1C: CPT

## 2024-04-19 PROCEDURE — 83735 ASSAY OF MAGNESIUM: CPT

## 2024-04-19 PROCEDURE — 80061 LIPID PANEL: CPT

## 2024-04-19 PROCEDURE — 82570 ASSAY OF URINE CREATININE: CPT

## 2024-04-19 PROCEDURE — 36415 COLL VENOUS BLD VENIPUNCTURE: CPT

## 2024-04-19 PROCEDURE — 82043 UR ALBUMIN QUANTITATIVE: CPT

## 2024-04-19 PROCEDURE — 85025 COMPLETE CBC W/AUTO DIFF WBC: CPT

## 2024-04-19 PROCEDURE — 80053 COMPREHEN METABOLIC PANEL: CPT

## 2024-04-22 ENCOUNTER — OFFICE VISIT (OUTPATIENT)
Dept: FAMILY MEDICINE CLINIC | Age: 69
End: 2024-04-22
Payer: COMMERCIAL

## 2024-04-22 VITALS
DIASTOLIC BLOOD PRESSURE: 72 MMHG | WEIGHT: 267.6 LBS | TEMPERATURE: 97.5 F | SYSTOLIC BLOOD PRESSURE: 124 MMHG | BODY MASS INDEX: 36.29 KG/M2 | HEART RATE: 70 BPM | OXYGEN SATURATION: 94 %

## 2024-04-22 DIAGNOSIS — Z12.11 SCREENING FOR COLON CANCER: ICD-10-CM

## 2024-04-22 DIAGNOSIS — D69.6 THROMBOCYTOPENIA (HCC): ICD-10-CM

## 2024-04-22 DIAGNOSIS — E11.9 TYPE 2 DIABETES MELLITUS WITHOUT COMPLICATION, WITHOUT LONG-TERM CURRENT USE OF INSULIN (HCC): Primary | ICD-10-CM

## 2024-04-22 DIAGNOSIS — I10 BENIGN ESSENTIAL HYPERTENSION: ICD-10-CM

## 2024-04-22 DIAGNOSIS — N28.9 RENAL DYSFUNCTION: ICD-10-CM

## 2024-04-22 DIAGNOSIS — E78.2 MIXED HYPERLIPIDEMIA: ICD-10-CM

## 2024-04-22 PROCEDURE — 99214 OFFICE O/P EST MOD 30 MIN: CPT | Performed by: NURSE PRACTITIONER

## 2024-04-22 PROCEDURE — 3074F SYST BP LT 130 MM HG: CPT | Performed by: NURSE PRACTITIONER

## 2024-04-22 PROCEDURE — 3078F DIAST BP <80 MM HG: CPT | Performed by: NURSE PRACTITIONER

## 2024-04-22 PROCEDURE — 3044F HG A1C LEVEL LT 7.0%: CPT | Performed by: NURSE PRACTITIONER

## 2024-04-22 PROCEDURE — 1123F ACP DISCUSS/DSCN MKR DOCD: CPT | Performed by: NURSE PRACTITIONER

## 2024-04-22 ASSESSMENT — PATIENT HEALTH QUESTIONNAIRE - PHQ9
1. LITTLE INTEREST OR PLEASURE IN DOING THINGS: NOT AT ALL
SUM OF ALL RESPONSES TO PHQ QUESTIONS 1-9: 0
SUM OF ALL RESPONSES TO PHQ QUESTIONS 1-9: 0
2. FEELING DOWN, DEPRESSED OR HOPELESS: NOT AT ALL
SUM OF ALL RESPONSES TO PHQ QUESTIONS 1-9: 0
SUM OF ALL RESPONSES TO PHQ9 QUESTIONS 1 & 2: 0
SUM OF ALL RESPONSES TO PHQ QUESTIONS 1-9: 0

## 2024-04-22 ASSESSMENT — ENCOUNTER SYMPTOMS
COLOR CHANGE: 0
EYES NEGATIVE: 1
NAUSEA: 0
VOMITING: 0
ALLERGIC/IMMUNOLOGIC NEGATIVE: 1
DIARRHEA: 0
GASTROINTESTINAL NEGATIVE: 1
WHEEZING: 0
COUGH: 0
CHEST TIGHTNESS: 0
RESPIRATORY NEGATIVE: 1

## 2024-04-22 NOTE — PROGRESS NOTES
[facility]  [address] [place] [address]  Dept: [phone]    Douglas Walters is a 68 y.o. male who presents today for his medical conditions/complaintsas noted below.      Douglas Walters is here today c/o Hypertension, Diabetes, Hyperlipidemia, and Discuss Labs    Past Medical History:   Diagnosis Date    Deep vein thrombosis (HCC)     x 2    Diabetes type 2, controlled (HCC)     [place] filter in place     Hyperlipidemia     Hypertension     Obesity     Unspecified sleep apnea       Past Surgical History:   Procedure Laterality Date    COLONOSCOPY  14    KNEE ARTHROSCOPY Left     TONSILLECTOMY      VENA CAVA FILTER PLACEMENT      [place] Filter       Family History   Problem Relation Age of Onset    Diabetes Mother     Cancer Mother         Blood disorder     Cancer Father 70        Prostate cancer       Social History     Tobacco Use    Smoking status: Former     Current packs/day: 0.00     Average packs/day: 2.5 packs/day for 20.0 years (50.0 ttl pk-yrs)     Types: Cigarettes     Start date: 1987     Quit date: 2007     Years since quittin.2    Smokeless tobacco: Never   Substance Use Topics    Alcohol use: Yes     Alcohol/week: 3.3 - 4.2 standard drinks of alcohol     Types: 4 - 5 Standard drinks or equivalent per week     Comment: occasional / weekends       Current Outpatient Medications   Medication Sig Dispense Refill    metFORMIN (GLUCOPHAGE) 500 MG tablet Take 1 tablet by mouth daily 90 tablet 1    lisinopril-hydroCHLOROthiazide (PRINZIDE;ZESTORETIC) 20-12.5 MG per tablet Take 1 tablet by mouth daily 90 tablet 1    amLODIPine (NORVASC) 10 MG tablet Take 1 tablet by mouth daily 90 tablet 1    rosuvastatin (CRESTOR) 5 MG tablet Take 1 tablet by mouth daily TAKE 1 TABLET BY MOUTH DAILY 90 tablet 1    apixaban (ELIQUIS) 5 MG TABS tablet Take 1 tablet by mouth 2 times daily 180 tablet 1     No current facility-administered medications for this visit.

## 2024-05-04 DIAGNOSIS — E78.2 MIXED HYPERLIPIDEMIA: ICD-10-CM

## 2024-05-06 RX ORDER — ROSUVASTATIN CALCIUM 5 MG/1
5 TABLET, COATED ORAL DAILY
Qty: 90 TABLET | Refills: 2 | Status: SHIPPED | OUTPATIENT
Start: 2024-05-06

## 2024-05-06 NOTE — TELEPHONE ENCOUNTER
Douglas Walters is calling to request a refill on the following medication(s):    Medication Request:  Requested Prescriptions     Pending Prescriptions Disp Refills    rosuvastatin (CRESTOR) 5 MG tablet [Pharmacy Med Name: ROSUVASTATIN 5MG TABLETS] 90 tablet 1     Sig: TAKE 1 TABLET BY MOUTH DAILY       Last Visit Date (If Applicable):  4/22/2024    Next Visit Date:    8/26/2024

## 2024-05-09 ENCOUNTER — TELEPHONE (OUTPATIENT)
Dept: GASTROENTEROLOGY | Age: 69
End: 2024-05-09

## 2024-05-14 ENCOUNTER — TELEPHONE (OUTPATIENT)
Dept: GASTROENTEROLOGY | Age: 69
End: 2024-05-14

## 2024-05-29 ENCOUNTER — TELEPHONE (OUTPATIENT)
Dept: FAMILY MEDICINE CLINIC | Age: 69
End: 2024-05-29

## 2024-05-29 NOTE — TELEPHONE ENCOUNTER
Patient called because his brother has a history of skin cancer and he was told it is inherited he want to know if you add a skin cancer testing to his lab that he will be doing on Monday. Stated that the test is called BRACA2

## 2024-05-30 NOTE — TELEPHONE ENCOUNTER
There are no labs that I'm aware of for skin cancer testing, he would need to see Dermatologist for skin evaluation for this. If they are talking about some type of genetic testing I would recommend he discusses this with his Hematologist/Oncologist

## 2024-05-31 NOTE — TELEPHONE ENCOUNTER
Candice has been advised.. she states he has appointment coming up with hematologist/oncologist in June and will ask him

## 2024-06-03 ENCOUNTER — HOSPITAL ENCOUNTER (OUTPATIENT)
Age: 69
Discharge: HOME | End: 2024-06-03
Payer: COMMERCIAL

## 2024-06-03 DIAGNOSIS — N28.9 RENAL DYSFUNCTION: ICD-10-CM

## 2024-06-03 LAB
ANION GAP SERPL CALCULATED.3IONS-SCNC: 8 MMOL/L (ref 9–17)
BUN SERPL-MCNC: 23 MG/DL (ref 8–23)
CALCIUM SERPL-MCNC: 9.3 MG/DL (ref 8.6–10.4)
CHLORIDE SERPL-SCNC: 103 MMOL/L (ref 98–107)
CO2 SERPL-SCNC: 29 MMOL/L (ref 20–31)
CREAT SERPL-MCNC: 1.1 MG/DL (ref 0.7–1.2)
GFR, ESTIMATED: 74 ML/MIN/1.73M2
GLUCOSE SERPL-MCNC: 139 MG/DL (ref 70–99)
POTASSIUM SERPL-SCNC: 4.5 MMOL/L (ref 3.7–5.3)
SODIUM SERPL-SCNC: 140 MMOL/L (ref 135–144)

## 2024-06-03 PROCEDURE — 36415 COLL VENOUS BLD VENIPUNCTURE: CPT

## 2024-06-03 PROCEDURE — 80048 BASIC METABOLIC PNL TOTAL CA: CPT

## 2024-06-06 ENCOUNTER — HOSPITAL ENCOUNTER (OUTPATIENT)
Age: 69
Discharge: HOME | End: 2024-06-06
Payer: COMMERCIAL

## 2024-06-06 DIAGNOSIS — E83.110 HEREDITARY HEMOCHROMATOSIS (HCC): ICD-10-CM

## 2024-06-06 DIAGNOSIS — D69.6 THROMBOCYTOPENIA (HCC): ICD-10-CM

## 2024-06-06 LAB
BASOPHILS # BLD: 0.1 K/UL (ref 0–0.2)
BASOPHILS NFR BLD: 1 % (ref 0–2)
EOSINOPHIL # BLD: 0.1 K/UL (ref 0–0.4)
EOSINOPHILS RELATIVE PERCENT: 3 % (ref 0–4)
ERYTHROCYTE [DISTWIDTH] IN BLOOD BY AUTOMATED COUNT: 13.5 % (ref 11.5–14.9)
FERRITIN SERPL-MCNC: 446 NG/ML (ref 30–400)
HCT VFR BLD AUTO: 40.7 % (ref 41–53)
HGB BLD-MCNC: 13.6 G/DL (ref 13.5–17.5)
LYMPHOCYTES NFR BLD: 2 K/UL (ref 1–4.8)
LYMPHOCYTES RELATIVE PERCENT: 40 % (ref 24–44)
MCH RBC QN AUTO: 31.5 PG (ref 26–34)
MCHC RBC AUTO-ENTMCNC: 33.5 G/DL (ref 31–37)
MCV RBC AUTO: 93.9 FL (ref 80–100)
MONOCYTES NFR BLD: 0.6 K/UL (ref 0.1–1.3)
MONOCYTES NFR BLD: 11 % (ref 1–7)
NEUTROPHILS NFR BLD: 45 % (ref 36–66)
NEUTS SEG NFR BLD: 2.3 K/UL (ref 1.3–9.1)
PLATELET # BLD AUTO: 142 K/UL (ref 150–450)
PMV BLD AUTO: 10.2 FL (ref 6–12)
RBC # BLD AUTO: 4.33 M/UL (ref 4.5–5.9)
WBC OTHER # BLD: 5.1 K/UL (ref 3.5–11)

## 2024-06-06 PROCEDURE — 85025 COMPLETE CBC W/AUTO DIFF WBC: CPT

## 2024-06-06 PROCEDURE — 36415 COLL VENOUS BLD VENIPUNCTURE: CPT

## 2024-06-06 PROCEDURE — 82728 ASSAY OF FERRITIN: CPT

## 2024-06-10 ENCOUNTER — TELEPHONE (OUTPATIENT)
Dept: ONCOLOGY | Age: 69
End: 2024-06-10

## 2024-06-10 ENCOUNTER — OFFICE VISIT (OUTPATIENT)
Dept: ONCOLOGY | Age: 69
End: 2024-06-10
Payer: COMMERCIAL

## 2024-06-10 ENCOUNTER — HOSPITAL ENCOUNTER (OUTPATIENT)
Dept: INFUSION THERAPY | Age: 69
Discharge: HOME | End: 2024-06-10
Payer: COMMERCIAL

## 2024-06-10 VITALS
SYSTOLIC BLOOD PRESSURE: 134 MMHG | RESPIRATION RATE: 18 BRPM | HEART RATE: 68 BPM | BODY MASS INDEX: 36.04 KG/M2 | DIASTOLIC BLOOD PRESSURE: 75 MMHG | OXYGEN SATURATION: 97 % | TEMPERATURE: 98 F | WEIGHT: 265.7 LBS

## 2024-06-10 VITALS — HEART RATE: 78 BPM | DIASTOLIC BLOOD PRESSURE: 81 MMHG | SYSTOLIC BLOOD PRESSURE: 126 MMHG | RESPIRATION RATE: 18 BRPM

## 2024-06-10 DIAGNOSIS — E66.01 SEVERE OBESITY (BMI 35.0-39.9) WITH COMORBIDITY (HCC): ICD-10-CM

## 2024-06-10 DIAGNOSIS — E83.110 HEREDITARY HEMOCHROMATOSIS (HCC): Primary | ICD-10-CM

## 2024-06-10 DIAGNOSIS — Z84.81 FAMILY HISTORY OF GENE MUTATION: ICD-10-CM

## 2024-06-10 PROCEDURE — 3075F SYST BP GE 130 - 139MM HG: CPT | Performed by: INTERNAL MEDICINE

## 2024-06-10 PROCEDURE — 99214 OFFICE O/P EST MOD 30 MIN: CPT | Performed by: INTERNAL MEDICINE

## 2024-06-10 PROCEDURE — 1123F ACP DISCUSS/DSCN MKR DOCD: CPT | Performed by: INTERNAL MEDICINE

## 2024-06-10 PROCEDURE — 3078F DIAST BP <80 MM HG: CPT | Performed by: INTERNAL MEDICINE

## 2024-06-10 PROCEDURE — 99195 PHLEBOTOMY: CPT

## 2024-06-10 PROCEDURE — 99211 OFF/OP EST MAY X REQ PHY/QHP: CPT | Performed by: INTERNAL MEDICINE

## 2024-06-10 NOTE — PATIENT INSTRUCTIONS
phlebotomy needed today  RV 6 months with CBC, ferritin before RV  Possible Therapeutic phlebotomy at RV  Referral to genetic testing (brother with BRCA gene positive)

## 2024-06-10 NOTE — PROGRESS NOTES
Pt was here for therapeutic phlebotomy. Pt seen by Dr Perez prior to tx, refer to his note. 500ml blood removed. Procedure was tolerated without incident. Pt declined to stay for monitoring period. Pt was discharged in stable condition.

## 2024-06-10 NOTE — TELEPHONE ENCOUNTER
Instructions   from Dr. Kvng Holden MD    phlebotomy needed today  RV 6 months with CBC, ferritin before RV  Possible Therapeutic phlebotomy at RV  Referral to genetic testing (brother with BRCA gene positive)      Phlebotomy today  RV 12/9/24 at 10:30 with possible phlebotomy to follow with labs being done the week before.   Genetic Counselor to call patient to schedule.

## 2024-06-11 NOTE — PROGRESS NOTES
ecchymosis. No history of clotting problems.  Infectious disease: No fever, chills or frequent infections.   Endocrine: No polydipsia or polyuria. No temperature intolerance.  Neurologic: No headaches or dizziness. No weakness or numbness of the extremities. No changes in balance, coordination,  memory, mentation, behavior.   Allergic/Immunologic: No nasal congestion or hives. No repeated infections.       PHYSICAL EXAM:  The patient is not in acute distress.  Vital signs: Blood pressure 134/75, pulse 68, temperature 98 °F (36.7 °C), temperature source Temporal, resp. rate 18, weight 120.5 kg (265 lb 11.2 oz), SpO2 97 %.     General appearance - well appearing, not in pain or distress  Mental status - good mood, alert and oriented  Eyes - pupils equal and reactive, extraocular eye movements intact  Ears - bilateral TM's and external ear canals normal  Nose - normal and patent, no erythema, discharge or polyps  Mouth - mucous membranes moist, pharynx normal without lesions  Neck - supple, no significant adenopathy  Lymphatics - no palpable lymphadenopathy, no hepatosplenomegaly  Chest - clear to auscultation, no wheezes, rales or rhonchi, symmetric air entry  Heart - normal rate, regular rhythm, normal S1, S2, no murmurs, rubs, clicks or gallops  Abdomen - soft, nontender, nondistended, no masses or organomegaly  Neurological - alert, oriented, normal speech, no focal findings or movement disorder noted  Musculoskeletal - no joint tenderness, deformity or swelling  Extremities - peripheral pulses normal, no pedal edema, no clubbing or cyanosis  Skin - normal coloration and turgor, no rashes, no suspicious skin lesions noted     Review of Diagnostic data:   Lab Results   Component Value Date    WBC 5.1 06/06/2024    HGB 13.6 06/06/2024    HCT 40.7 (L) 06/06/2024    MCV 93.9 06/06/2024     (L) 06/06/2024       Chemistry        Component Value Date/Time     06/03/2024 0748    K 4.5 06/03/2024 0748

## 2024-06-25 ENCOUNTER — INITIAL CONSULT (OUTPATIENT)
Dept: ONCOLOGY | Age: 69
End: 2024-06-25
Payer: COMMERCIAL

## 2024-06-25 DIAGNOSIS — Z80.42 FAMILY HISTORY OF PROSTATE CANCER: Primary | ICD-10-CM

## 2024-06-25 DIAGNOSIS — Z80.3 FAMILY HISTORY OF BREAST CANCER: ICD-10-CM

## 2024-06-25 PROCEDURE — 96040 PR MEDICAL GENETICS COUNSELING EACH 30 MINUTES: CPT | Performed by: GENETIC COUNSELOR, MS

## 2024-06-25 NOTE — PROGRESS NOTES
testing via a multi-gene panel was recommended and offered to Mr. Walters.     3) Mr. Walters elected to proceed with the Cancer[employer] Expanded + RNA Insight Hereditary Cancer Gene Panel.     4) Mr. Walters is aware that he will receive a notification from the laboratory ([facility]) if the out of pocket cost for testing exceeds $100 (based on individual insurance plan) and the alternative option to proceed with the self pay price of $250.     5) Informed consent was obtained and a blood sample was sent to [facility]. We will call Mr. Walters with results as soon as they are available. A follow up appointment may be recommended.  A summary letter with results and final medical management recommendations will be sent once available.      A total of 30 minutes were spent face to face with Mr. Walters and 50% of the time was spent educating and counseling.     The [facility] Genetic Counseling Program would be glad to offer our assistance should you have any questions or concerns about this information. Please feel free to contact us at [phone].        Gaby León MS, [facility]   Licensed Genetic Counselor

## 2024-07-09 ENCOUNTER — TELEPHONE (OUTPATIENT)
Dept: ONCOLOGY | Age: 69
End: 2024-07-09

## 2024-07-09 NOTE — TELEPHONE ENCOUNTER
MEMBERS   1) Genetic testing is not recommended for Mr. Walters's children based on his negative test results. However, this recommendation does not take into consideration any family history of cancer in their maternal family.     2) It is possible that the cancers in Mr. Walters's family are due to a hereditary gene mutation that he did not inherit. Therefore, his relatives (particularly those with a current or previous cancer diagnosis) may consider genetic counseling and testing to clarify this possibility. Relatives may contact the [facility] Genetic Counseling Program at [phone] or locate a genetic counselor at [email or web address].     3) We encourage Mr. Walters's relatives to discuss their family history of cancer with their physicians to determine the most appropriate cancer screening recommendations.     SUMMARY & PLAN   1) Mr. Walters's genetic test results are negative meaning there were no clinically significant mutations detected in the 71 genes analyzed.     2) There are no changes in medical management for Mr. Walters based on his negative genetic test results. He should continue cancer screenings as directed by his physicians.     3) We recommend that his brother with a somatic BRCA mutation undergo confirmation germline testing to determine if the mutation is hereditary or not.     4) We encourage Mr. Walters to contact us every 1-2 years to determine if there are any new genetic testing or research options available.     5) We encourage Mr. Walters to contact us with updates to his personal and/or family’s cancer history as this information may alter our assessment and/or recommendations.     The [facility] Genetic Counseling Program would be glad to offer our assistance should you have any questions or concerns about this information. Please feel free to contact us at [phone].       Gaby León MS, [facility]   Licensed Genetic Counselor         CC:  Mr. Douglas Calderon

## 2024-07-29 DIAGNOSIS — E11.9 CONTROLLED TYPE 2 DIABETES MELLITUS WITHOUT COMPLICATION, WITHOUT LONG-TERM CURRENT USE OF INSULIN (HCC): ICD-10-CM

## 2024-07-29 NOTE — TELEPHONE ENCOUNTER
Douglas Walters is calling to request a refill on the following medication(s):    Medication Request:  Requested Prescriptions     Pending Prescriptions Disp Refills    metFORMIN (GLUCOPHAGE) 500 MG tablet [Pharmacy Med Name: METFORMIN 500MG TABLETS] 90 tablet 1     Sig: TAKE 1 TABLET BY MOUTH DAILY       Last Visit Date (If Applicable):  4/22/2024    Next Visit Date:    8/26/2024

## 2024-08-02 DIAGNOSIS — I10 BENIGN ESSENTIAL HYPERTENSION: ICD-10-CM

## 2024-08-02 NOTE — TELEPHONE ENCOUNTER
Douglas Walters is calling to request a refill on the following medication(s):    Medication Request:  Requested Prescriptions     Pending Prescriptions Disp Refills    lisinopril-hydroCHLOROthiazide (PRINZIDE;ZESTORETIC) 20-12.5 MG per tablet [Pharmacy Med Name: LISINOPRIL-HCTZ 20/12.5MG TABLETS] 90 tablet 1     Sig: TAKE 1 TABLET BY MOUTH DAILY    amLODIPine (NORVASC) 10 MG tablet [Pharmacy Med Name: AMLODIPINE BESYLATE 10MG TABLETS] 90 tablet 1     Sig: TAKE 1 TABLET BY MOUTH DAILY       Last Visit Date (If Applicable):  4/22/2024    Next Visit Date:    8/26/2024

## 2024-08-05 RX ORDER — LISINOPRIL AND HYDROCHLOROTHIAZIDE 20; 12.5 MG/1; MG/1
1 TABLET ORAL DAILY
Qty: 90 TABLET | Refills: 2 | Status: SHIPPED | OUTPATIENT
Start: 2024-08-05

## 2024-08-05 RX ORDER — AMLODIPINE BESYLATE 10 MG/1
10 TABLET ORAL DAILY
Qty: 90 TABLET | Refills: 2 | Status: SHIPPED | OUTPATIENT
Start: 2024-08-05

## 2024-08-26 ENCOUNTER — OFFICE VISIT (OUTPATIENT)
Dept: FAMILY MEDICINE CLINIC | Age: 69
End: 2024-08-26
Payer: COMMERCIAL

## 2024-08-26 VITALS
HEART RATE: 72 BPM | WEIGHT: 271.6 LBS | OXYGEN SATURATION: 96 % | SYSTOLIC BLOOD PRESSURE: 122 MMHG | HEIGHT: 72 IN | BODY MASS INDEX: 36.79 KG/M2 | DIASTOLIC BLOOD PRESSURE: 64 MMHG | TEMPERATURE: 98.5 F

## 2024-08-26 DIAGNOSIS — E11.9 CONTROLLED TYPE 2 DIABETES MELLITUS WITHOUT COMPLICATION, WITHOUT LONG-TERM CURRENT USE OF INSULIN (HCC): Primary | ICD-10-CM

## 2024-08-26 DIAGNOSIS — I10 BENIGN ESSENTIAL HYPERTENSION: ICD-10-CM

## 2024-08-26 DIAGNOSIS — Z86.718 HISTORY OF RECURRENT DEEP VEIN THROMBOSIS (DVT): ICD-10-CM

## 2024-08-26 DIAGNOSIS — Z12.11 SCREENING FOR COLON CANCER: ICD-10-CM

## 2024-08-26 DIAGNOSIS — E78.2 MIXED HYPERLIPIDEMIA: ICD-10-CM

## 2024-08-26 LAB — HBA1C MFR BLD: 6.7 %

## 2024-08-26 PROCEDURE — 3078F DIAST BP <80 MM HG: CPT | Performed by: NURSE PRACTITIONER

## 2024-08-26 PROCEDURE — 3044F HG A1C LEVEL LT 7.0%: CPT | Performed by: NURSE PRACTITIONER

## 2024-08-26 PROCEDURE — 3074F SYST BP LT 130 MM HG: CPT | Performed by: NURSE PRACTITIONER

## 2024-08-26 PROCEDURE — 83036 HEMOGLOBIN GLYCOSYLATED A1C: CPT | Performed by: NURSE PRACTITIONER

## 2024-08-26 PROCEDURE — 99214 OFFICE O/P EST MOD 30 MIN: CPT | Performed by: NURSE PRACTITIONER

## 2024-08-26 PROCEDURE — 1123F ACP DISCUSS/DSCN MKR DOCD: CPT | Performed by: NURSE PRACTITIONER

## 2024-08-26 SDOH — ECONOMIC STABILITY: FOOD INSECURITY: WITHIN THE PAST 12 MONTHS, THE FOOD YOU BOUGHT JUST DIDN'T LAST AND YOU DIDN'T HAVE MONEY TO GET MORE.: NEVER TRUE

## 2024-08-26 SDOH — ECONOMIC STABILITY: INCOME INSECURITY: HOW HARD IS IT FOR YOU TO PAY FOR THE VERY BASICS LIKE FOOD, HOUSING, MEDICAL CARE, AND HEATING?: NOT HARD AT ALL

## 2024-08-26 SDOH — ECONOMIC STABILITY: FOOD INSECURITY: WITHIN THE PAST 12 MONTHS, YOU WORRIED THAT YOUR FOOD WOULD RUN OUT BEFORE YOU GOT MONEY TO BUY MORE.: NEVER TRUE

## 2024-08-26 ASSESSMENT — ENCOUNTER SYMPTOMS
DIARRHEA: 0
VOMITING: 0
CHEST TIGHTNESS: 0
NAUSEA: 0
EYES NEGATIVE: 1
RESPIRATORY NEGATIVE: 1
GASTROINTESTINAL NEGATIVE: 1
COLOR CHANGE: 0
ALLERGIC/IMMUNOLOGIC NEGATIVE: 1
WHEEZING: 0
CHOKING: 0
COUGH: 0

## 2024-08-26 ASSESSMENT — PATIENT HEALTH QUESTIONNAIRE - PHQ9
1. LITTLE INTEREST OR PLEASURE IN DOING THINGS: NOT AT ALL
SUM OF ALL RESPONSES TO PHQ9 QUESTIONS 1 & 2: 0
SUM OF ALL RESPONSES TO PHQ QUESTIONS 1-9: 0
2. FEELING DOWN, DEPRESSED OR HOPELESS: NOT AT ALL

## 2024-08-26 NOTE — PROGRESS NOTES
without long-term current use of insulin (HCC)    - POCT glycosylated hemoglobin (Hb A1C)    @ goal  Encouraged healthy diet, regular exercise & weight loss  F/u 3 months or sooner PRN   Handouts given on AVS    Instructed goal A1C 6.5-7 %   Advised medication benefits and side effects   Advised dietary recommendations including avoidance of carbs and concentrated sweets  Importance of daily physical activity and weight loss in disease management  Discussed importance of statin and ASCVD benefits  Advised yearly diabetic eye examinations  Advised routine monitoring of feet  Importance to notify if hypoglycemia sx and directions  Importance of regular meals with insulin/medications   Adverse effects of uncontrolled diabetes including heart disease, CVA, kidney disease, neuropathy, retinopathy, PVD etc.  Importance of routine follow-up every 3 months is key     2. Benign essential hypertension    Controlled, continue meds, labs up to date & reviewed     3. Mixed hyperlipidemia    Stable, continue statin     4. History of recurrent deep vein thrombosis (DVT)    On anti-coagulation , following with Heme     5. Screening for colon cancer    [facility]        Discussed use, benefit, and side effects of prescribed medications.All patient questions answered.  Pt voiced understanding. Reviewed health maintenance.Instructed to continue current medications, diet and exercise.  Patient agreedwith treatment plan. Follow up as directed.     Electronically signed by ADRI Banegas CNP on 8/26/2024

## 2024-08-27 ENCOUNTER — TELEPHONE (OUTPATIENT)
Dept: GASTROENTEROLOGY | Age: 69
End: 2024-08-27

## 2024-08-27 NOTE — TELEPHONE ENCOUNTER
TBS/ colonoscopy Hung  Needs office visit for due to Eliquis  Attempt 1- left voicemail  Attempt 2- sent letter

## 2024-10-07 ENCOUNTER — OFFICE VISIT (OUTPATIENT)
Dept: FAMILY MEDICINE CLINIC | Age: 69
End: 2024-10-07
Payer: COMMERCIAL

## 2024-10-07 VITALS
DIASTOLIC BLOOD PRESSURE: 58 MMHG | BODY MASS INDEX: 37.03 KG/M2 | SYSTOLIC BLOOD PRESSURE: 120 MMHG | WEIGHT: 273.4 LBS | HEIGHT: 72 IN | HEART RATE: 84 BPM | TEMPERATURE: 97.3 F | OXYGEN SATURATION: 94 %

## 2024-10-07 DIAGNOSIS — M54.41 ACUTE RIGHT-SIDED LOW BACK PAIN WITH RIGHT-SIDED SCIATICA: Primary | ICD-10-CM

## 2024-10-07 PROCEDURE — 96372 THER/PROPH/DIAG INJ SC/IM: CPT | Performed by: NURSE PRACTITIONER

## 2024-10-07 PROCEDURE — 99214 OFFICE O/P EST MOD 30 MIN: CPT | Performed by: NURSE PRACTITIONER

## 2024-10-07 PROCEDURE — 3074F SYST BP LT 130 MM HG: CPT | Performed by: NURSE PRACTITIONER

## 2024-10-07 PROCEDURE — 1123F ACP DISCUSS/DSCN MKR DOCD: CPT | Performed by: NURSE PRACTITIONER

## 2024-10-07 PROCEDURE — 3078F DIAST BP <80 MM HG: CPT | Performed by: NURSE PRACTITIONER

## 2024-10-07 RX ORDER — TIZANIDINE 2 MG/1
2-4 TABLET ORAL EVERY 8 HOURS PRN
Qty: 30 TABLET | Refills: 0 | Status: SHIPPED | OUTPATIENT
Start: 2024-10-07

## 2024-10-07 RX ORDER — METHYLPREDNISOLONE ACETATE 40 MG/ML
40 INJECTION, SUSPENSION INTRA-ARTICULAR; INTRALESIONAL; INTRAMUSCULAR; SOFT TISSUE ONCE
Status: COMPLETED | OUTPATIENT
Start: 2024-10-07 | End: 2024-10-07

## 2024-10-07 RX ADMIN — METHYLPREDNISOLONE ACETATE 40 MG: 40 INJECTION, SUSPENSION INTRA-ARTICULAR; INTRALESIONAL; INTRAMUSCULAR; SOFT TISSUE at 12:11

## 2024-10-07 ASSESSMENT — ENCOUNTER SYMPTOMS
RESPIRATORY NEGATIVE: 1
ALLERGIC/IMMUNOLOGIC NEGATIVE: 1
NAUSEA: 0
BACK PAIN: 1
EYES NEGATIVE: 1
COLOR CHANGE: 0
COUGH: 0
CHEST TIGHTNESS: 0
VOMITING: 0
GASTROINTESTINAL NEGATIVE: 1
DIARRHEA: 0
BOWEL INCONTINENCE: 0

## 2024-10-07 ASSESSMENT — PATIENT HEALTH QUESTIONNAIRE - PHQ9
2. FEELING DOWN, DEPRESSED OR HOPELESS: NOT AT ALL
SUM OF ALL RESPONSES TO PHQ QUESTIONS 1-9: 0
1. LITTLE INTEREST OR PLEASURE IN DOING THINGS: NOT AT ALL
SUM OF ALL RESPONSES TO PHQ9 QUESTIONS 1 & 2: 0
SUM OF ALL RESPONSES TO PHQ QUESTIONS 1-9: 0

## 2024-10-07 NOTE — PROGRESS NOTES
Behavior: Behavior normal.         Thought Content: Thought content normal.         Judgment: Judgment normal.           Assessment:         1. Acute right-sided low back pain with right-sided sciatica        Plan:     1. Acute right-sided low back pain with right-sided sciatica    - XR LUMBAR SPINE (2-3 VIEWS); Future  - XR HIP RIGHT (2-3 VIEWS); Future  - methylPREDNISolone acetate (DEPO-MEDROL) injection 40 mg  - tiZANidine (ZANAFLEX) 2 MG tablet; Take 1-2 tablets by mouth every 8 hours as needed (muscle spasms)  Dispense: 30 tablet; Refill: 0  - [facility] as ordered  PT referral  Supportive care discussed including Tylenol, heat, topicals, etc.   Side effects of steroids discussed   Follow-up post PT completion if needed   Future plan of care could include MRI lumbar & PM referral       Discussed use, benefit, and side effects of prescribed medications.All patient questions answered.  Pt voiced understanding. Reviewed health maintenance.Instructed to continue current medications, diet and exercise.  Patient agreedwith treatment plan. Follow up as directed.     Electronically signed by ADRI Banegas CNP on 10/7/2024

## 2024-10-09 ENCOUNTER — HOSPITAL ENCOUNTER (OUTPATIENT)
Dept: GENERAL RADIOLOGY | Age: 69
Discharge: HOME | End: 2024-10-11
Payer: COMMERCIAL

## 2024-10-09 ENCOUNTER — HOSPITAL ENCOUNTER (OUTPATIENT)
Age: 69
Discharge: HOME | End: 2024-10-11
Payer: COMMERCIAL

## 2024-10-09 DIAGNOSIS — M54.41 ACUTE RIGHT-SIDED LOW BACK PAIN WITH RIGHT-SIDED SCIATICA: ICD-10-CM

## 2024-10-09 PROCEDURE — 72100 X-RAY EXAM L-S SPINE 2/3 VWS: CPT

## 2024-10-09 PROCEDURE — 73502 X-RAY EXAM HIP UNI 2-3 VIEWS: CPT

## 2024-10-10 DIAGNOSIS — M41.9 SCOLIOSIS OF LUMBAR SPINE, UNSPECIFIED SCOLIOSIS TYPE: ICD-10-CM

## 2024-10-10 DIAGNOSIS — M51.362 DEGENERATION OF INTERVERTEBRAL DISC OF LUMBAR REGION WITH DISCOGENIC BACK PAIN AND LOWER EXTREMITY PAIN: ICD-10-CM

## 2024-10-10 DIAGNOSIS — M54.41 ACUTE RIGHT-SIDED LOW BACK PAIN WITH RIGHT-SIDED SCIATICA: Primary | ICD-10-CM

## 2024-10-28 ENCOUNTER — HOSPITAL ENCOUNTER (OUTPATIENT)
Dept: PHYSICAL THERAPY | Age: 69
Setting detail: THERAPIES SERIES
Discharge: HOME | End: 2024-10-28
Payer: COMMERCIAL

## 2024-10-28 PROCEDURE — 97162 PT EVAL MOD COMPLEX 30 MIN: CPT

## 2024-10-28 PROCEDURE — 97110 THERAPEUTIC EXERCISES: CPT

## 2024-10-28 NOTE — THERAPY EVALUATION
occupation with working long hours and sleeping during the day may hinder his ability to perform his HEP and increase time to progress within sessions. Patient would continue to benefit from skilled physical therapy to increase lumbar range of motion, increase bilaterally LE strength, decrease tissue tension in B hip flexors and piriformis and R > L lumbar paraspinal tightness, increase functional mobility, and decrease pain.     Problems:    [x] ? Pain: 3-4/10 rest; 5-8/10 activity  [x] ? ROM: decreased lumbar range of motion (xx/100%): extension 25%, B side bend 50%, B rotation 50% with extension, L side bend, and B rotation being painful at end range  [x] ? Strength:    Left Right   Hip Flexion  4+/5 4+/5   Ext 4+/5 4+/5   Abd 4/5 4/5   Add 4/5 4/5   Knee Flex 5/5 5/5   Ext  5/5 5/5   Ankle DF 4/5 4/5   PF 5/5 5/5     [x] ? Function: Mod LIS 44% impairment    Goals: (to be met in 10 treatments)  ? Pain: Patient will report a decrease to 3/10 pain or less in his low back pain throughout his daily and occupational activities.  ? ROM: Patient will increase his lumbar range of motion to at least 75% with no pain to reduce strain on his low back and ease activities that involve lifting and twisting.   ? Strength: Patient will increase B gross LE strength to at least 4+/5 to ease daily and occupational activities involving walking, stairs, and lifting.   ? Function: Patient will decrease his score on his Mod LIS to 34% or less to ease ADLs and IADLs.   Independent with Home Exercise Programs  Patient will demonstrate the ability to progress his HEP to continue with at home management with gross LE strengthening and stretching to manage condition at discharge.   Patient will be able to perform 10 squats with proper form and no increase in pain to ease daily activities requiring squatting to lift or pick an object up from the ground.   Patient will report being able to walk/stand for at least 10 minutes to ease going to

## 2024-11-04 ENCOUNTER — HOSPITAL ENCOUNTER (OUTPATIENT)
Dept: PHYSICAL THERAPY | Age: 69
Setting detail: THERAPIES SERIES
Discharge: HOME | End: 2024-11-04
Payer: COMMERCIAL

## 2024-11-04 ENCOUNTER — OFFICE VISIT (OUTPATIENT)
Dept: NEUROSURGERY | Age: 69
End: 2024-11-04
Payer: COMMERCIAL

## 2024-11-04 VITALS
HEIGHT: 72 IN | BODY MASS INDEX: 36.16 KG/M2 | DIASTOLIC BLOOD PRESSURE: 72 MMHG | WEIGHT: 267 LBS | HEART RATE: 81 BPM | SYSTOLIC BLOOD PRESSURE: 113 MMHG

## 2024-11-04 DIAGNOSIS — M47.26 OTHER SPONDYLOSIS WITH RADICULOPATHY, LUMBAR REGION: Primary | ICD-10-CM

## 2024-11-04 DIAGNOSIS — Z13.828 SCOLIOSIS CONCERN: ICD-10-CM

## 2024-11-04 DIAGNOSIS — M53.9 MULTILEVEL DEGENERATIVE DISC DISEASE: ICD-10-CM

## 2024-11-04 PROCEDURE — 3074F SYST BP LT 130 MM HG: CPT

## 2024-11-04 PROCEDURE — 1123F ACP DISCUSS/DSCN MKR DOCD: CPT

## 2024-11-04 PROCEDURE — 97110 THERAPEUTIC EXERCISES: CPT

## 2024-11-04 PROCEDURE — 99204 OFFICE O/P NEW MOD 45 MIN: CPT

## 2024-11-04 PROCEDURE — 3078F DIAST BP <80 MM HG: CPT

## 2024-11-04 RX ORDER — PREDNISONE 20 MG/1
TABLET ORAL
Qty: 30 TABLET | Refills: 0 | Status: SHIPPED | OUTPATIENT
Start: 2024-11-04 | End: 2024-11-19

## 2024-11-04 ASSESSMENT — ENCOUNTER SYMPTOMS: BACK PAIN: 1

## 2024-11-04 NOTE — PROGRESS NOTES
Ordered:  Orders Placed This Encounter   Medications    predniSONE (DELTASONE) 20 MG tablet     Sig: Take 3 tablets PO daily for first 5 days, then take 2 tablets PO daily for next 5 days, then take 1 tablet PO daily for last 5 days     Dispense:  30 tablet     Refill:  0      Orders Placed:  Orders Placed This Encounter   Procedures    XR SPINE ENTIRE (2-3 VIEWS)     Standing Status:   Future     Standing Expiration Date:   11/4/2025     Scheduling Instructions:      Standing AP/lateral; include C2 to pelvis including both femoral heads     Order Specific Question:   Reason for exam:     Answer:   scoliosis    XR LUMBAR SPINE (MIN 4 VIEWS)     Standing Status:   Future     Standing Expiration Date:   11/4/2025     Scheduling Instructions:      Standing AP; and lateral: neutral/flex/ex     Order Specific Question:   Reason for exam:     Answer:   assess alignment and for instability        Electronically signed by ADRI Simmons CNP on 11/4/2024 at 3:05 PM    Please note that this chart was generated using voice recognition Dragon dictation software.  Although every effort was made to ensure the accuracy of this automated transcription, some errors in transcription may have occurred.

## 2024-11-04 NOTE — FLOWSHEET NOTE
[facility]   Outpatient Rehabilitation & Therapy  [address]  P: [phone]   F: [phone]    Physical Therapy Daily Treatment Note      Date:  2024  Patient Name:  Douglas Walters    :  1955  MRN: [id number]  Physician: Inge Maldonado APRN - CNP                             Insurance: [employer] ($25 co-pay, NPRE, BOMN, CPTs valid [id number])  Medical Diagnosis: M54.41 (ICD-10-CM) - Acute right-sided low back pain with right-sided sciatica                   Rehab Codes: M54.41 low back pain with right sided sciatica M62.81 weakness  Onset Date: 10/7/24                                 Next 's appt: 24  Visit# / total visits: 2/10  Cancels/No Shows: 0/0    Subjective:      Patient reports today that he has been working on HEP as much as he can. Reported that he feels piriformis stretch feels good while in stretch, but increases pain/radicular symptoms following. Reported that he has tried stretches on both bed and floor with no difference. Patient denied any pain into leg upon arrival today.       Pain:  [x] Yes  [] No Location: RLB/SI region  Pain Rating: (0-10 scale) 5-6/10  Pain altered Tx:  [x] No  [] Yes  Action:  Comments:    Objective:  Modalities:   Precautions:  Exercises:  Exercise Reps/ Time Weight/ Level Completed  Today Comments   SKTC: RLE 3x30\"  x    Figure 4 Stretch BLE 3x30\"  x           Alternating Marches with TrA 20x  x    BKFO's 10x2 ea Red x    Modified Dead Bugs 10x2 ea  x LE portion only          Side-lying Hip Abduction 10x2  x           Seated Med Ball Lifts 20x 4# x    Seated Med Ball Press 20x 4# x           Paloff Press 20x ea Red x    Other:    Specific Instructions for next treatment: review HEP, supine gross LE strengthening, core activation, trial flexion exercise series       Assessment: [x] Progressing toward goals. First session following eval. Performed figure 4 stretch to stretch piriformis vs technique shown during eval with

## 2024-11-05 ENCOUNTER — HOSPITAL ENCOUNTER (OUTPATIENT)
Age: 69
Discharge: HOME | End: 2024-11-07
Payer: COMMERCIAL

## 2024-11-05 ENCOUNTER — HOSPITAL ENCOUNTER (OUTPATIENT)
Dept: GENERAL RADIOLOGY | Age: 69
Discharge: HOME | End: 2024-11-07
Payer: COMMERCIAL

## 2024-11-05 DIAGNOSIS — M53.9 MULTILEVEL DEGENERATIVE DISC DISEASE: ICD-10-CM

## 2024-11-05 DIAGNOSIS — Z13.828 SCOLIOSIS CONCERN: ICD-10-CM

## 2024-11-05 DIAGNOSIS — M47.26 OTHER SPONDYLOSIS WITH RADICULOPATHY, LUMBAR REGION: ICD-10-CM

## 2024-11-05 PROCEDURE — 72082 X-RAY EXAM ENTIRE SPI 2/3 VW: CPT

## 2024-11-05 PROCEDURE — 72110 X-RAY EXAM L-2 SPINE 4/>VWS: CPT

## 2024-11-11 ENCOUNTER — HOSPITAL ENCOUNTER (OUTPATIENT)
Dept: PHYSICAL THERAPY | Age: 69
Setting detail: THERAPIES SERIES
Discharge: HOME | End: 2024-11-11
Payer: COMMERCIAL

## 2024-11-11 PROCEDURE — 97110 THERAPEUTIC EXERCISES: CPT

## 2024-11-11 NOTE — FLOWSHEET NOTE
tolerated per pt, core activation, trial flexion exercise series     Assessment: [x] Progressing toward goals.   Continued with exercises as charted above with adding/progressing where indicated. Pt reports no BLE symptoms following stretching and SKTC but does report L sided back pain near his L hip with performing marches. Cues throughout session to perform exercises slow and controlled to get more benefit with exercises, fair return on verbal corrections of form for exercises performed. Reports no LE symptoms with pain slightly increasing to 6/10. Pt encouraged to continue with exercises at home when not here and stretches daily. Plan is to continue with current POC and progress/add as able.          [] No change.     [] Other:    [x] Patient would continue to benefit from skilled physical therapy services in order to:  benefit from skilled physical therapy to increase lumbar range of motion, increase bilaterally LE strength, decrease tissue tension in B hip flexors and piriformis and R > L lumbar paraspinal tightness, increase functional mobility, and decrease pain.     Goals: (to be met in 10 treatments)  ? Pain: Patient will report a decrease to 3/10 pain or less in his low back pain throughout his daily and occupational activities.  ? ROM: Patient will increase his lumbar range of motion to at least 75% with no pain to reduce strain on his low back and ease activities that involve lifting and twisting.   ? Strength: Patient will increase B gross LE strength to at least 4+/5 to ease daily and occupational activities involving walking, stairs, and lifting.   ? Function: Patient will decrease his score on his Mod LIS to 34% or less to ease ADLs and IADLs.   Independent with Home Exercise Programs  Patient will demonstrate the ability to progress his HEP to continue with at home management with gross LE strengthening and stretching to manage condition at discharge.   Patient will be able to perform 10 squats with

## 2024-11-18 ENCOUNTER — OFFICE VISIT (OUTPATIENT)
Dept: GASTROENTEROLOGY | Age: 69
End: 2024-11-18
Payer: COMMERCIAL

## 2024-11-18 ENCOUNTER — PREP FOR PROCEDURE (OUTPATIENT)
Dept: GASTROENTEROLOGY | Age: 69
End: 2024-11-18

## 2024-11-18 ENCOUNTER — HOSPITAL ENCOUNTER (OUTPATIENT)
Dept: PHYSICAL THERAPY | Age: 69
Setting detail: THERAPIES SERIES
Discharge: HOME | End: 2024-11-18
Payer: COMMERCIAL

## 2024-11-18 VITALS
HEIGHT: 72 IN | HEART RATE: 71 BPM | WEIGHT: 270 LBS | DIASTOLIC BLOOD PRESSURE: 79 MMHG | SYSTOLIC BLOOD PRESSURE: 128 MMHG | RESPIRATION RATE: 18 BRPM | BODY MASS INDEX: 36.57 KG/M2 | TEMPERATURE: 98 F

## 2024-11-18 DIAGNOSIS — Z86.0100 HISTORY OF COLON POLYPS: ICD-10-CM

## 2024-11-18 DIAGNOSIS — Z83.719 FAMILY HISTORY OF COLONIC POLYPS: ICD-10-CM

## 2024-11-18 DIAGNOSIS — K57.90 DIVERTICULOSIS: ICD-10-CM

## 2024-11-18 DIAGNOSIS — E83.110 HEREDITARY HEMOCHROMATOSIS (HCC): Primary | ICD-10-CM

## 2024-11-18 DIAGNOSIS — K42.9 UMBILICAL HERNIA WITHOUT OBSTRUCTION AND WITHOUT GANGRENE: ICD-10-CM

## 2024-11-18 PROCEDURE — 1123F ACP DISCUSS/DSCN MKR DOCD: CPT | Performed by: INTERNAL MEDICINE

## 2024-11-18 PROCEDURE — 3078F DIAST BP <80 MM HG: CPT | Performed by: INTERNAL MEDICINE

## 2024-11-18 PROCEDURE — 97110 THERAPEUTIC EXERCISES: CPT

## 2024-11-18 PROCEDURE — 3074F SYST BP LT 130 MM HG: CPT | Performed by: INTERNAL MEDICINE

## 2024-11-18 PROCEDURE — 99204 OFFICE O/P NEW MOD 45 MIN: CPT | Performed by: INTERNAL MEDICINE

## 2024-11-18 RX ORDER — BISACODYL 5 MG/1
TABLET, DELAYED RELEASE ORAL
Qty: 4 TABLET | Refills: 0 | Status: SHIPPED | OUTPATIENT
Start: 2024-11-18

## 2024-11-18 RX ORDER — SODIUM, POTASSIUM,MAG SULFATES 17.5-3.13G
1 SOLUTION, RECONSTITUTED, ORAL ORAL ONCE
Qty: 1 EACH | Refills: 0 | Status: SHIPPED | OUTPATIENT
Start: 2024-11-18 | End: 2024-11-18

## 2024-11-18 NOTE — TELEPHONE ENCOUNTER
Procedure scheduled/Dr Persaud  Procedure:Colonoscopy   Dx:hx of polyps  Date:02/24/2025  Time:8:15 am /  Arrive: 6:45 am  Hospital:[facility] phone call:janice  Bowel Prep instructions given:Suprep  In office/via phone:office   Clearance needed:eliquis

## 2024-11-18 NOTE — FLOWSHEET NOTE
[facility]   Outpatient Rehabilitation & Therapy  [address] [facility] [address]  P: [phone]   F: [phone]    Physical Therapy Daily Treatment Note    Date:  2024  Patient Name:  Douglas Walters    :  1955  MRN: [id number]  Physician: Inge Maldonado APRN - CNP                             Insurance: BCBS ($25 co-pay, NPRE, BOMN, CPTs valid [id number])  Medical Diagnosis: M54.41 (ICD-10-CM) - Acute right-sided low back pain with right-sided sciatica                   Rehab Codes: M54.41 low back pain with right sided sciatica M62.81 weakness  Onset Date: 10/7/24                                 Next 's appt: 24  Visit# / total visits: 4/10  Cancels/No Shows: 0/0    Subjective:      Patient reports today that back is still sore. Patient denied any radicular symptoms today.       Pain:  [x] Yes  [] No Location: RLB/SI region  Pain Rating: (0-10 scale) 4/10  Pain altered Tx:  [x] No  [] Yes  Action:  Comments:    Objective:  Modalities:   Precautions: None  Exercises:  Exercise Reps/ Time Weight/ Level Completed  Today Comments   SKTC: RLE 3x30\"  x    Figure 4 Stretch BLE 3x30\"  x           Alternating Marches with TrA 20x Red  x  add TB   BKFO's 10x2 ea Red x    Modified Dead Bugs 10x2 ea  x LE portion only moving, arms up    Bridges 10x2 Red x Added           Side-lying Hip Abduction 10x2  x    Clamshells 10x2 Red  Added           Seated Med Ball Lifts 20x 4#     Seated Med Ball Press 20x 4#            Paloff Press 2x10 ea Green x Increased resistance    Rows/extensions 2x10 ea Green  x Increased resistance   Focus on core activation   3 way hip 15x ea  x Added   Focus on core activation   Mini squats 10x  x Added   Focus on core activation   Other:    Specific Instructions for next treatment: supine gross LE strengthening progressing to standing as able and tolerated per pt, core activation, trial flexion exercise series

## 2024-11-18 NOTE — PROGRESS NOTES
Neurological:      General: No focal deficit present.      Mental Status: He is alert and oriented to person, place, and time. Mental status is at baseline.   Psychiatric:         Mood and Affect: Mood is not anxious.         Behavior: Behavior is cooperative.         Cognition and Memory: Cognition is not impaired. Memory is not impaired.         IMPRESSION: Mr. Walters is a 69 y.o. male with    Diagnosis Orders   1. Hereditary hemochromatosis (HCC)  CT ABDOMEN PELVIS W IV CONTRAST Additional Contrast? None    Comprehensive Metabolic Panel with Bilirubin    Iron and TIBC    Protime-INR    AFP Tumor Marker      2. History of colon polyps  CT ABDOMEN PELVIS W IV CONTRAST Additional Contrast? None    Comprehensive Metabolic Panel with Bilirubin    Iron and TIBC    Protime-INR      3. Diverticulosis  CT ABDOMEN PELVIS W IV CONTRAST Additional Contrast? None      4. Umbilical hernia without obstruction and without gangrene  CT ABDOMEN PELVIS W IV CONTRAST Additional Contrast? None        Due for screening colonoscopy  Will send out for clearance  Continue to follow LFTs and iron studies  Due for liver imaging.  F/u after scopes      Medication where reviewed, side effects from the GI medication were reviewed with the patient  We did refill the GI medications    Diet/life style/natural hx /complication of the dx were all explained in details   Past medical, past surgical, social history, psychiatric history, medications or allergies, all reviewed and  updated    Thank you for allowing me to participate in the care of Mr. Walters. For any further questions please do not hesitate to contact me.    I have reviewed and agree with the ROS entered by the MA/RN.     Note is dictated utilizing voice recognition software. Unfortunately this leads to occasional typographical errors. Please contact our office if you have any questions.  This note is created with the assistance of the speech recognition program. While intending to

## 2024-11-25 ENCOUNTER — HOSPITAL ENCOUNTER (OUTPATIENT)
Dept: PHYSICAL THERAPY | Age: 69
Setting detail: THERAPIES SERIES
Discharge: HOME | End: 2024-11-25
Payer: COMMERCIAL

## 2024-11-25 ENCOUNTER — HOSPITAL ENCOUNTER (OUTPATIENT)
Age: 69
Discharge: HOME | End: 2024-11-25
Payer: COMMERCIAL

## 2024-11-25 ENCOUNTER — HOSPITAL ENCOUNTER (OUTPATIENT)
Dept: CT IMAGING | Age: 69
Discharge: HOME | End: 2024-11-27
Payer: COMMERCIAL

## 2024-11-25 ENCOUNTER — OFFICE VISIT (OUTPATIENT)
Dept: FAMILY MEDICINE CLINIC | Age: 69
End: 2024-11-25
Payer: COMMERCIAL

## 2024-11-25 VITALS
DIASTOLIC BLOOD PRESSURE: 67 MMHG | SYSTOLIC BLOOD PRESSURE: 111 MMHG | TEMPERATURE: 98.8 F | BODY MASS INDEX: 36.7 KG/M2 | HEIGHT: 72 IN | OXYGEN SATURATION: 95 % | WEIGHT: 271 LBS | HEART RATE: 91 BPM

## 2024-11-25 DIAGNOSIS — K42.9 UMBILICAL HERNIA WITHOUT OBSTRUCTION AND WITHOUT GANGRENE: ICD-10-CM

## 2024-11-25 DIAGNOSIS — Z86.0100 HISTORY OF COLON POLYPS: ICD-10-CM

## 2024-11-25 DIAGNOSIS — K43.9 VENTRAL HERNIA WITHOUT OBSTRUCTION OR GANGRENE: ICD-10-CM

## 2024-11-25 DIAGNOSIS — K57.90 DIVERTICULOSIS: ICD-10-CM

## 2024-11-25 DIAGNOSIS — E11.65 TYPE 2 DIABETES MELLITUS WITH HYPERGLYCEMIA, WITHOUT LONG-TERM CURRENT USE OF INSULIN (HCC): Primary | ICD-10-CM

## 2024-11-25 DIAGNOSIS — Z86.718 HISTORY OF RECURRENT DEEP VEIN THROMBOSIS (DVT): ICD-10-CM

## 2024-11-25 DIAGNOSIS — E83.110 HEREDITARY HEMOCHROMATOSIS (HCC): ICD-10-CM

## 2024-11-25 DIAGNOSIS — E78.2 MIXED HYPERLIPIDEMIA: ICD-10-CM

## 2024-11-25 DIAGNOSIS — I10 PRIMARY HYPERTENSION: ICD-10-CM

## 2024-11-25 LAB
ALBUMIN SERPL-MCNC: 3.9 G/DL (ref 3.5–5.2)
ALP SERPL-CCNC: 64 U/L (ref 40–129)
ALT SERPL-CCNC: 30 U/L (ref 10–50)
ANION GAP SERPL CALCULATED.3IONS-SCNC: 10 MMOL/L (ref 9–16)
AST SERPL-CCNC: 24 U/L (ref 10–50)
BILIRUB DIRECT SERPL-MCNC: 0.2 MG/DL (ref 0–0.3)
BILIRUB INDIRECT SERPL-MCNC: 0.4 MG/DL (ref 0–1)
BILIRUB SERPL-MCNC: 0.6 MG/DL (ref 0–1.2)
BUN SERPL-MCNC: 20 MG/DL (ref 8–23)
CALCIUM SERPL-MCNC: 8.8 MG/DL (ref 8.6–10.4)
CHLORIDE SERPL-SCNC: 102 MMOL/L (ref 98–107)
CO2 SERPL-SCNC: 27 MMOL/L (ref 20–31)
CREAT SERPL-MCNC: 1.3 MG/DL (ref 0.7–1.2)
GFR, ESTIMATED: 60 ML/MIN/1.73M2
GLUCOSE SERPL-MCNC: 141 MG/DL (ref 74–99)
HBA1C MFR BLD: 7.6 %
INR PPP: 1.1
IRON SATN MFR SERPL: 50 % (ref 20–55)
IRON SERPL-MCNC: 114 UG/DL (ref 61–157)
POTASSIUM SERPL-SCNC: 4.2 MMOL/L (ref 3.7–5.3)
PROT SERPL-MCNC: 6.3 G/DL (ref 6.6–8.7)
PROTHROMBIN TIME: 14.1 SEC (ref 11.8–14.6)
SODIUM SERPL-SCNC: 139 MMOL/L (ref 136–145)
TIBC SERPL-MCNC: 227 UG/DL (ref 250–450)
UNSATURATED IRON BINDING CAPACITY: 113 UG/DL (ref 112–347)

## 2024-11-25 PROCEDURE — 97110 THERAPEUTIC EXERCISES: CPT

## 2024-11-25 PROCEDURE — 3078F DIAST BP <80 MM HG: CPT | Performed by: NURSE PRACTITIONER

## 2024-11-25 PROCEDURE — 6360000004 HC RX CONTRAST MEDICATION: Performed by: PHYSICIAN ASSISTANT

## 2024-11-25 PROCEDURE — 2580000003 HC RX 258: Performed by: PHYSICIAN ASSISTANT

## 2024-11-25 PROCEDURE — 1123F ACP DISCUSS/DSCN MKR DOCD: CPT | Performed by: NURSE PRACTITIONER

## 2024-11-25 PROCEDURE — 83540 ASSAY OF IRON: CPT

## 2024-11-25 PROCEDURE — 83036 HEMOGLOBIN GLYCOSYLATED A1C: CPT | Performed by: NURSE PRACTITIONER

## 2024-11-25 PROCEDURE — 74177 CT ABD & PELVIS W/CONTRAST: CPT

## 2024-11-25 PROCEDURE — 36415 COLL VENOUS BLD VENIPUNCTURE: CPT

## 2024-11-25 PROCEDURE — 2500000003 HC RX 250 WO HCPCS: Performed by: PHYSICIAN ASSISTANT

## 2024-11-25 PROCEDURE — 3074F SYST BP LT 130 MM HG: CPT | Performed by: NURSE PRACTITIONER

## 2024-11-25 PROCEDURE — 82248 BILIRUBIN DIRECT: CPT

## 2024-11-25 PROCEDURE — 80053 COMPREHEN METABOLIC PANEL: CPT

## 2024-11-25 PROCEDURE — 3051F HG A1C>EQUAL 7.0%<8.0%: CPT | Performed by: NURSE PRACTITIONER

## 2024-11-25 PROCEDURE — 83550 IRON BINDING TEST: CPT

## 2024-11-25 PROCEDURE — 99214 OFFICE O/P EST MOD 30 MIN: CPT | Performed by: NURSE PRACTITIONER

## 2024-11-25 PROCEDURE — 85610 PROTHROMBIN TIME: CPT

## 2024-11-25 RX ORDER — LISINOPRIL AND HYDROCHLOROTHIAZIDE 12.5; 2 MG/1; MG/1
1 TABLET ORAL DAILY
Qty: 90 TABLET | Refills: 2 | Status: SHIPPED | OUTPATIENT
Start: 2024-11-25

## 2024-11-25 RX ORDER — IOPAMIDOL 755 MG/ML
75 INJECTION, SOLUTION INTRAVASCULAR
Status: COMPLETED | OUTPATIENT
Start: 2024-11-25 | End: 2024-11-25

## 2024-11-25 RX ORDER — ROSUVASTATIN CALCIUM 5 MG/1
5 TABLET, COATED ORAL DAILY
Qty: 90 TABLET | Refills: 2 | Status: SHIPPED | OUTPATIENT
Start: 2024-11-25

## 2024-11-25 RX ORDER — AMLODIPINE BESYLATE 10 MG/1
10 TABLET ORAL DAILY
Qty: 90 TABLET | Refills: 2 | Status: SHIPPED | OUTPATIENT
Start: 2024-11-25

## 2024-11-25 RX ORDER — 0.9 % SODIUM CHLORIDE 0.9 %
100 INTRAVENOUS SOLUTION INTRAVENOUS ONCE
Status: COMPLETED | OUTPATIENT
Start: 2024-11-25 | End: 2024-11-25

## 2024-11-25 RX ORDER — SODIUM CHLORIDE 0.9 % (FLUSH) 0.9 %
10 SYRINGE (ML) INJECTION PRN
Status: DISCONTINUED | OUTPATIENT
Start: 2024-11-25 | End: 2024-11-28 | Stop reason: HOSPADM

## 2024-11-25 RX ADMIN — SODIUM CHLORIDE 100 ML: 9 INJECTION, SOLUTION INTRAVENOUS at 08:31

## 2024-11-25 RX ADMIN — SODIUM CHLORIDE, PRESERVATIVE FREE 10 ML: 5 INJECTION INTRAVENOUS at 08:31

## 2024-11-25 RX ADMIN — BARIUM SULFATE 450 ML: 20 SUSPENSION ORAL at 08:31

## 2024-11-25 RX ADMIN — IOPAMIDOL 75 ML: 755 INJECTION, SOLUTION INTRAVENOUS at 08:31

## 2024-11-25 ASSESSMENT — ENCOUNTER SYMPTOMS
SHORTNESS OF BREATH: 0
NAUSEA: 0
VOMITING: 0
COLOR CHANGE: 0
WHEEZING: 0
ALLERGIC/IMMUNOLOGIC NEGATIVE: 1
BACK PAIN: 1
COUGH: 0
CHEST TIGHTNESS: 0
DIARRHEA: 0
GASTROINTESTINAL NEGATIVE: 1
EYES NEGATIVE: 1
RESPIRATORY NEGATIVE: 1

## 2024-11-25 ASSESSMENT — PATIENT HEALTH QUESTIONNAIRE - PHQ9
SUM OF ALL RESPONSES TO PHQ QUESTIONS 1-9: 0
2. FEELING DOWN, DEPRESSED OR HOPELESS: NOT AT ALL
SUM OF ALL RESPONSES TO PHQ QUESTIONS 1-9: 0
1. LITTLE INTEREST OR PLEASURE IN DOING THINGS: NOT AT ALL
SUM OF ALL RESPONSES TO PHQ9 QUESTIONS 1 & 2: 0

## 2024-11-25 NOTE — PROGRESS NOTES
[facility]  [address] [place] [address]  Dept: [phone]    Douglas Walters is a 69 y.o. male who presents today for his medical conditions/complaintsas noted below.      Douglas Walters is here today c/o Hypertension, Diabetes, and Hyperlipidemia    Past Medical History:   Diagnosis Date    Deep vein thrombosis (HCC)     x 2    Diabetes type 2, controlled (HCC)     [place] filter in place     Hyperlipidemia     Hypertension     Obesity     Unspecified sleep apnea       Past Surgical History:   Procedure Laterality Date    COLONOSCOPY  14    KNEE ARTHROSCOPY Left     TONSILLECTOMY      VENA CAVA FILTER PLACEMENT      [place] Filter       Family History   Problem Relation Age of Onset    Diabetes Mother     Cancer Mother         Blood disorder     Cancer Father 70        Prostate cancer       Social History     Tobacco Use    Smoking status: Former     Current packs/day: 0.00     Average packs/day: 2.5 packs/day for 20.0 years (50.0 ttl pk-yrs)     Types: Cigarettes     Start date: 1987     Quit date: 2007     Years since quittin.7    Smokeless tobacco: Never   Substance Use Topics    Alcohol use: Yes     Alcohol/week: 3.3 - 4.2 standard drinks of alcohol     Types: 4 - 5 Standard drinks or equivalent per week     Comment: occasional / weekends       Current Outpatient Medications   Medication Sig Dispense Refill    lisinopril-hydroCHLOROthiazide (PRINZIDE;ZESTORETIC) 20-12.5 MG per tablet TAKE 1 TABLET BY MOUTH DAILY 90 tablet 2    amLODIPine (NORVASC) 10 MG tablet TAKE 1 TABLET BY MOUTH DAILY 90 tablet 2    metFORMIN (GLUCOPHAGE) 500 MG tablet TAKE 1 TABLET BY MOUTH DAILY 90 tablet 2    rosuvastatin (CRESTOR) 5 MG tablet TAKE 1 TABLET BY MOUTH DAILY 90 tablet 2    apixaban (ELIQUIS) 5 MG TABS tablet Take 1 tablet by mouth 2 times daily 180 tablet 1     No current facility-administered medications for this visit.     Facility-Administered Medications

## 2024-11-25 NOTE — FLOWSHEET NOTE
11/25  Focus on core activation   SLS  2x30\"  x Added 11/25   Other:    Specific Instructions for next treatment: supine gross LE strengthening progressing to standing as able and tolerated per pt, core activation, trial flexion exercise series     Assessment: [x] Progressing toward goals.   Continued with exercises charted above with progressions where indicated. Added alternating marches in standing to work on core contraction in standing with more functional positioning. Added SLS this date to work on balance and core activation in standing with good tolerance and mild to moderate sway as pt became fatigued. Pt demonstrates muscle weakness/fatigue with dead bugs and bridges evidenced by difficulty maintaining height of his LE and hip height decreasing respectively. Muscle fatigue and soreness noted at end of session greater in hips than low back this date. Pt reports he feels about the same upon leaving session with B hip > low back soreness.     [] No change.     [] Other:    [x] Patient would continue to benefit from skilled physical therapy services in order to:  benefit from skilled physical therapy to increase lumbar range of motion, increase bilaterally LE strength, decrease tissue tension in B hip flexors and piriformis and R > L lumbar paraspinal tightness, increase functional mobility, and decrease pain.     Goals: (to be met in 10 treatments)  ? Pain: Patient will report a decrease to 3/10 pain or less in his low back pain throughout his daily and occupational activities.  ? ROM: Patient will increase his lumbar range of motion to at least 75% with no pain to reduce strain on his low back and ease activities that involve lifting and twisting.   ? Strength: Patient will increase B gross LE strength to at least 4+/5 to ease daily and occupational activities involving walking, stairs, and lifting.   ? Function: Patient will decrease his score on his Mod LIS to 34% or less to ease ADLs and IADLs.

## 2024-11-25 NOTE — FLOWSHEET NOTE
[facility]   Outpatient Rehabilitation & Therapy  [address]  P: [phone]   F: [phone]     Physical Therapy Cancel/No Show note    Date: 2024  Patient: oDuglas Walters  : 1955  MRN: [id number]    Visit Count: 4/10  Cancels/No Shows to date:     For today's appointment patient:    [x]  Cancelled    [] Rescheduled appointment    [] No-show     Reason given by patient:    []  Patient ill    []  Conflicting appointment    [] No transportation      [] Conflict with work    [] No reason given    [] Weather related    [] COVID-19    [x] Other:      Comments: Pt is having a CT and was unsure about making it back in time.        [x] Next appointment was confirmed: 24 @ 8:45 am     Electronically signed by: ELVIRA Collier  Evaluation/treatment performed by Student PT under the supervision of co-signing PT who agrees with all evaluation/treatment and documentation.

## 2024-12-02 ENCOUNTER — HOSPITAL ENCOUNTER (OUTPATIENT)
Dept: PHYSICAL THERAPY | Age: 69
Setting detail: THERAPIES SERIES
Discharge: HOME | End: 2024-12-02
Payer: COMMERCIAL

## 2024-12-02 ENCOUNTER — HOSPITAL ENCOUNTER (OUTPATIENT)
Dept: SURGERY | Age: 69
Discharge: HOME | End: 2024-12-02
Payer: COMMERCIAL

## 2024-12-02 VITALS
DIASTOLIC BLOOD PRESSURE: 92 MMHG | HEIGHT: 72 IN | BODY MASS INDEX: 36.57 KG/M2 | WEIGHT: 270 LBS | OXYGEN SATURATION: 96 % | HEART RATE: 77 BPM | SYSTOLIC BLOOD PRESSURE: 164 MMHG

## 2024-12-02 PROCEDURE — 97110 THERAPEUTIC EXERCISES: CPT

## 2024-12-02 PROCEDURE — 99203 OFFICE O/P NEW LOW 30 MIN: CPT | Performed by: STUDENT IN AN ORGANIZED HEALTH CARE EDUCATION/TRAINING PROGRAM

## 2024-12-02 NOTE — THERAPY DISCHARGE
[facility]   Outpatient Rehabilitation & Therapy  [address]  P: [phone]   F: [phone]    Physical Therapy Daily Treatment/Progress/Discharge Note    Date:  2024  Patient Name:  Douglas Walters    :  1955  MRN: [id number]  Physician: Inge Maldonado, ADRI - CNP                             Insurance: BCBS ($25 co-pay, NPRE, BOMN, CPTs valid [id number])  Medical Diagnosis: M54.41 (ICD-10-CM) - Acute right-sided low back pain with right-sided sciatica                   Rehab Codes: M54.41 low back pain with right sided sciatica M62.81 weakness  Onset Date: 10/7/24                                 Next 's appt: 24  Visit# / total visits: 6/10  Cancels/No Shows: 0/0    Subjective:    Patient reports his back feels about the same from last session. He is having more difficulties sleeping recently not being able to get comfortable on his L side and waking up throughout the night d/t increased back pain and . Pt feels that he has not made any progress in skilled PT since starting.    Pain:  [x] Yes  [] No Location: RLB/SI region  Pain Rating: (0-10 scale) 5/10  Pain altered Tx:  [x] No  [] Yes  Action:  Comments:    Objective:   p = pain, L = Lacks, WNL=within normal limits                 ROM  ° A/P STRENGTH   ROM     Left Right Left Right                 Lumbar               Flexion 90%   Hip Flexion      4+/5 4+/5 Extension 25% p   Ext     4+/5 4+/5 Rotation L 75% p R 75% p   Abd     4+/5 4+/5 Sidebend L 50% p R 50%   Add     4+/5 4+/5         Knee Flex     5/5 5/5         Ext      5/5 5/5         Ankle DF     4+/5 4+/5         PF     5/5 5/5         Functional Tests:  Mod LIS: 22/50= 44% impairment (0% change from initial evaluation)    Modalities:   Precautions: None  Exercises:  Exercise Reps/ Time Weight/ Level Completed  Today Comments   SKTC: RLE 3x30\"  x    Figure 4 Stretch BLE 3x30\"  x           Alternating Marches with TrA 20x Red    add TB

## 2024-12-02 NOTE — CONSULTS
[facility]  Consult Note            NAME:  Douglas Walters  MRN: [id number]   YOB: 1955   Date: 12/2/2024   Age: 69 y.o.  Gender: male     Body mass index is 36.62 kg/m².     Chief Complaint: Ventral hernias    History of Present Illness: Patient is a 69-year-old male with history of diabetes, hypertension, hyperlipidemia, hemochromatosis, recurrent bilateral DVTs and PE status post [place] filter and on Eliquis who presents with 2 ventral hernias.  Patient reports that he has had his epigastric lump for more than 25 years and has had the umbilical hernia for more than 20.  States that he knows that they have been growing over time and initially did not think of it.  However, the umbilical hernia has been giving him some mild discomfort especially when he is bending down and moving around to work on his car.  He does state that he has been big for most of his life.  Denies any fevers, chills, shortness of breath or chest pain, nausea or vomiting.  States that he does have normal bowel movements.  He only sought evaluation for his hernias after CT abdomen pelvis performed on 11/25/2024 demonstrated a fat-containing umbilical hernia and small midline fat-containing abdominal wall hernia above the level of the umbilicus.  Patient reports that he got the CT scan as ordered by his gastroenterologist prior to his colonoscopy.  Non-smoker.  Denies any abdominal surgeries in the past.    Current Outpatient Medications on File Prior to Encounter   Medication Sig Dispense Refill    metFORMIN (GLUCOPHAGE) 500 MG tablet Take 2 tablets by mouth daily 180 tablet 3    apixaban (ELIQUIS) 5 MG TABS tablet Take 1 tablet by mouth 2 times daily 180 tablet 1    amLODIPine (NORVASC) 10 MG tablet Take 1 tablet by mouth daily 90 tablet 2    lisinopril-hydroCHLOROthiazide (PRINZIDE;ZESTORETIC) 20-12.5 MG per tablet Take 1 tablet by mouth daily 90 tablet 2    rosuvastatin (CRESTOR) 5 MG tablet Take 1

## 2024-12-06 ENCOUNTER — HOSPITAL ENCOUNTER (OUTPATIENT)
Age: 69
Discharge: HOME | End: 2024-12-06
Payer: COMMERCIAL

## 2024-12-06 DIAGNOSIS — E83.110 HEREDITARY HEMOCHROMATOSIS (HCC): ICD-10-CM

## 2024-12-06 LAB
BASOPHILS # BLD: 0 K/UL (ref 0–0.2)
BASOPHILS NFR BLD: 1 % (ref 0–2)
EOSINOPHIL # BLD: 0.1 K/UL (ref 0–0.4)
EOSINOPHILS RELATIVE PERCENT: 3 % (ref 0–4)
ERYTHROCYTE [DISTWIDTH] IN BLOOD BY AUTOMATED COUNT: 13.3 % (ref 11.5–14.9)
FERRITIN SERPL-MCNC: 450 NG/ML
HCT VFR BLD AUTO: 39.5 % (ref 41–53)
HGB BLD-MCNC: 13.5 G/DL (ref 13.5–17.5)
LYMPHOCYTES NFR BLD: 1.7 K/UL (ref 1–4.8)
LYMPHOCYTES RELATIVE PERCENT: 37 % (ref 24–44)
MCH RBC QN AUTO: 31.9 PG (ref 26–34)
MCHC RBC AUTO-ENTMCNC: 34.2 G/DL (ref 31–37)
MCV RBC AUTO: 93.1 FL (ref 80–100)
MONOCYTES NFR BLD: 0.5 K/UL (ref 0.1–1.3)
MONOCYTES NFR BLD: 12 % (ref 1–7)
NEUTROPHILS NFR BLD: 47 % (ref 36–66)
NEUTS SEG NFR BLD: 2.2 K/UL (ref 1.3–9.1)
PLATELET # BLD AUTO: 133 K/UL (ref 150–450)
PMV BLD AUTO: 10 FL (ref 6–12)
RBC # BLD AUTO: 4.24 M/UL (ref 4.5–5.9)
WBC OTHER # BLD: 4.6 K/UL (ref 3.5–11)

## 2024-12-06 PROCEDURE — 85025 COMPLETE CBC W/AUTO DIFF WBC: CPT

## 2024-12-06 PROCEDURE — 82728 ASSAY OF FERRITIN: CPT

## 2024-12-06 PROCEDURE — 36415 COLL VENOUS BLD VENIPUNCTURE: CPT

## 2024-12-09 ENCOUNTER — TELEPHONE (OUTPATIENT)
Dept: ONCOLOGY | Age: 69
End: 2024-12-09

## 2024-12-09 ENCOUNTER — OFFICE VISIT (OUTPATIENT)
Dept: ONCOLOGY | Age: 69
End: 2024-12-09
Payer: COMMERCIAL

## 2024-12-09 VITALS
BODY MASS INDEX: 37 KG/M2 | SYSTOLIC BLOOD PRESSURE: 130 MMHG | TEMPERATURE: 98.1 F | DIASTOLIC BLOOD PRESSURE: 79 MMHG | RESPIRATION RATE: 18 BRPM | HEART RATE: 74 BPM | OXYGEN SATURATION: 95 % | WEIGHT: 272.8 LBS

## 2024-12-09 DIAGNOSIS — E83.110 HEREDITARY HEMOCHROMATOSIS (HCC): Primary | ICD-10-CM

## 2024-12-09 PROCEDURE — 1123F ACP DISCUSS/DSCN MKR DOCD: CPT | Performed by: INTERNAL MEDICINE

## 2024-12-09 PROCEDURE — 99214 OFFICE O/P EST MOD 30 MIN: CPT | Performed by: INTERNAL MEDICINE

## 2024-12-09 PROCEDURE — 99211 OFF/OP EST MAY X REQ PHY/QHP: CPT | Performed by: INTERNAL MEDICINE

## 2024-12-09 PROCEDURE — 3075F SYST BP GE 130 - 139MM HG: CPT | Performed by: INTERNAL MEDICINE

## 2024-12-09 PROCEDURE — 3078F DIAST BP <80 MM HG: CPT | Performed by: INTERNAL MEDICINE

## 2024-12-16 ENCOUNTER — OFFICE VISIT (OUTPATIENT)
Dept: NEUROSURGERY | Age: 69
End: 2024-12-16
Payer: COMMERCIAL

## 2024-12-16 VITALS
BODY MASS INDEX: 36.54 KG/M2 | WEIGHT: 269.8 LBS | SYSTOLIC BLOOD PRESSURE: 119 MMHG | HEART RATE: 78 BPM | DIASTOLIC BLOOD PRESSURE: 74 MMHG | HEIGHT: 72 IN

## 2024-12-16 DIAGNOSIS — M53.9 MULTILEVEL DEGENERATIVE DISC DISEASE: ICD-10-CM

## 2024-12-16 DIAGNOSIS — M47.26 OTHER SPONDYLOSIS WITH RADICULOPATHY, LUMBAR REGION: Primary | ICD-10-CM

## 2024-12-16 PROCEDURE — 3074F SYST BP LT 130 MM HG: CPT

## 2024-12-16 PROCEDURE — 3078F DIAST BP <80 MM HG: CPT

## 2024-12-16 PROCEDURE — 1123F ACP DISCUSS/DSCN MKR DOCD: CPT

## 2024-12-16 PROCEDURE — 99214 OFFICE O/P EST MOD 30 MIN: CPT

## 2024-12-16 RX ORDER — SODIUM, POTASSIUM,MAG SULFATES 17.5-3.13G
SOLUTION, RECONSTITUTED, ORAL ORAL
COMMUNITY
Start: 2024-11-18

## 2024-12-16 NOTE — PROGRESS NOTES
[place] [id number] [facility]  [id number]  [address]  [facility] [address]  Dept: [phone]    Patient:  Douglas Walters  YOB: 1955  Date: 12/16/24    The patient is a 69 y.o. male who presents today for consult of the following problems:     Chief Complaint   Patient presents with    Follow-up     Pt stated he got steroid shot and steroids and the medication helped his back.         HPI:     Douglas Walters is a 69 y.o. male who presents for follow up of radiating lower back pain.    The patient was last seen in the office on 11/14 for radiating lower back pain. Since starting a prednisone taper and engaging in physical therapy, the patient reports significant improvement in the radiating pain. The current axial lower back pain is mild, with a severity of 2-3/10. The patient denies any associated symptoms such as weakness, numbness, tingling, bladder or bowel incontinence, or saddle anesthesia. A colonoscopy is scheduled for February.    History of mild thrombocytopenia, bilateral DVT's, and pulmonary embolisms for about 8 years, has a [place] filter and is on Eliquis. Following Hematology/Oncology.      History:     Past Medical History:   Diagnosis Date    Deep vein thrombosis (HCC)     x 2    Diabetes type 2, controlled (HCC)     Angelina filter in place     Hyperlipidemia     Hypertension     Obesity     Unspecified sleep apnea      Past Surgical History:   Procedure Laterality Date    COLONOSCOPY  8/26/14    KNEE ARTHROSCOPY Left     TONSILLECTOMY      VENA CAVA FILTER PLACEMENT      Angelina Filter     Family History   Problem Relation Age of Onset    Diabetes Mother     Cancer Mother         Blood disorder     Cancer Father 70        Prostate cancer     Current Outpatient Medications on File Prior to Visit   Medication Sig Dispense Refill    sodium-potassium-mag sulfate (SUPREP)

## 2025-01-06 ENCOUNTER — HOSPITAL ENCOUNTER (OUTPATIENT)
Dept: MRI IMAGING | Age: 70
Discharge: HOME | End: 2025-01-08
Payer: COMMERCIAL

## 2025-01-06 DIAGNOSIS — M47.26 OTHER SPONDYLOSIS WITH RADICULOPATHY, LUMBAR REGION: ICD-10-CM

## 2025-01-06 DIAGNOSIS — M53.9 MULTILEVEL DEGENERATIVE DISC DISEASE: ICD-10-CM

## 2025-01-06 PROCEDURE — 72148 MRI LUMBAR SPINE W/O DYE: CPT

## 2025-01-24 DIAGNOSIS — Z86.718 HISTORY OF RECURRENT DEEP VEIN THROMBOSIS (DVT): ICD-10-CM

## 2025-01-24 RX ORDER — APIXABAN 5 MG/1
5 TABLET, FILM COATED ORAL 2 TIMES DAILY
Qty: 180 TABLET | Refills: 1 | Status: SHIPPED | OUTPATIENT
Start: 2025-01-24

## 2025-01-24 NOTE — TELEPHONE ENCOUNTER
Douglas Walters is calling to request a refill on the following medication(s):    Medication Request:  Requested Prescriptions     Pending Prescriptions Disp Refills    ELIQUIS 5 MG TABS tablet [Pharmacy Med Name: ELIQUIS 5MG TABLETS] 180 tablet 1     Sig: TAKE 1 TABLET BY MOUTH TWICE DAILY       Last Visit Date (If Applicable):  11/25/2024    Next Visit Date:    6/2/2025

## 2025-02-05 ENCOUNTER — TELEPHONE (OUTPATIENT)
Dept: GASTROENTEROLOGY | Age: 70
End: 2025-02-05

## 2025-02-05 NOTE — TELEPHONE ENCOUNTER
Received call from patients significant other Candice stating pt is having hernia surgery on 2/28/2025 and was advised by hernia surgeon that pt not have his colonoscopy on 2/24/2025 due to having to be off of his blood thinners for too long.     Requesting to cancel colonoscopy for 2/24/2025 - will call back after hernia surgery to r/s.    Would like call confirming procedure has been cancelled.    Please advise.    Call back#: [phone]

## 2025-02-10 ENCOUNTER — HOSPITAL ENCOUNTER (OUTPATIENT)
Dept: PREADMISSION TESTING | Age: 70
Discharge: HOME | End: 2025-02-14
Payer: COMMERCIAL

## 2025-02-10 VITALS
OXYGEN SATURATION: 96 % | TEMPERATURE: 97.9 F | HEART RATE: 80 BPM | WEIGHT: 270 LBS | SYSTOLIC BLOOD PRESSURE: 157 MMHG | RESPIRATION RATE: 18 BRPM | BODY MASS INDEX: 36.57 KG/M2 | DIASTOLIC BLOOD PRESSURE: 77 MMHG | HEIGHT: 72 IN

## 2025-02-10 DIAGNOSIS — Z01.818 PREOP TESTING: Primary | ICD-10-CM

## 2025-02-10 LAB
ANION GAP SERPL CALCULATED.3IONS-SCNC: 9 MMOL/L (ref 9–16)
BUN SERPL-MCNC: 20 MG/DL (ref 8–23)
CALCIUM SERPL-MCNC: 9.4 MG/DL (ref 8.6–10.4)
CHLORIDE SERPL-SCNC: 102 MMOL/L (ref 98–107)
CO2 SERPL-SCNC: 29 MMOL/L (ref 20–31)
CREAT SERPL-MCNC: 1.1 MG/DL (ref 0.7–1.2)
EKG ATRIAL RATE: 78 BPM
EKG P AXIS: 61 DEGREES
EKG P-R INTERVAL: 188 MS
EKG Q-T INTERVAL: 390 MS
EKG QRS DURATION: 104 MS
EKG QTC CALCULATION (BAZETT): 444 MS
EKG R AXIS: -16 DEGREES
EKG T AXIS: 28 DEGREES
EKG VENTRICULAR RATE: 78 BPM
ERYTHROCYTE [DISTWIDTH] IN BLOOD BY AUTOMATED COUNT: 11.8 % (ref 11.8–14.4)
GFR, ESTIMATED: 73 ML/MIN/1.73M2
GLUCOSE SERPL-MCNC: 154 MG/DL (ref 74–99)
HCT VFR BLD AUTO: 44.1 % (ref 40.7–50.3)
HGB BLD-MCNC: 14 G/DL (ref 13–17)
MCH RBC QN AUTO: 31.1 PG (ref 25.2–33.5)
MCHC RBC AUTO-ENTMCNC: 31.7 G/DL (ref 28.4–34.8)
MCV RBC AUTO: 98 FL (ref 82.6–102.9)
NRBC BLD-RTO: 0 PER 100 WBC
PLATELET # BLD AUTO: ABNORMAL K/UL (ref 138–453)
PLATELET, FLUORESCENCE: 150 K/UL (ref 138–453)
PLATELETS.RETICULATED NFR BLD AUTO: 12.5 % (ref 1.1–10.3)
POTASSIUM SERPL-SCNC: 4.5 MMOL/L (ref 3.7–5.3)
RBC # BLD AUTO: 4.5 M/UL (ref 4.21–5.77)
SODIUM SERPL-SCNC: 140 MMOL/L (ref 136–145)
WBC OTHER # BLD: 5.6 K/UL (ref 3.5–11.3)

## 2025-02-10 PROCEDURE — 93005 ELECTROCARDIOGRAM TRACING: CPT | Performed by: ANESTHESIOLOGY

## 2025-02-10 PROCEDURE — 36415 COLL VENOUS BLD VENIPUNCTURE: CPT

## 2025-02-10 PROCEDURE — 80048 BASIC METABOLIC PNL TOTAL CA: CPT

## 2025-02-10 PROCEDURE — 85055 RETICULATED PLATELET ASSAY: CPT

## 2025-02-10 PROCEDURE — 83036 HEMOGLOBIN GLYCOSYLATED A1C: CPT

## 2025-02-10 PROCEDURE — 85027 COMPLETE CBC AUTOMATED: CPT

## 2025-02-10 RX ORDER — ACETAMINOPHEN 500 MG
1000 TABLET ORAL 2 TIMES DAILY
COMMUNITY

## 2025-02-10 NOTE — PRE-PROCEDURE INSTRUCTIONS
On the Day of Your Surgery, Friday, 2/28/25, Please Arrive At 5:45 AM     Enter [facility] through the Main Entrance, take the lobby elevators to the second floor and check in at the Surgery Registration desk.     Continue to take your home medications as you normally do up to and including the night before surgery with the exception of blood thinning medications.    Blood Thinning Medications:  Please stop prescription blood thinning medications such as Apixaban (Eliquis); Clopidogrel (Plavix); Dabigatran (Pradaxa); Prasugrel (Effient); Rivaroxaban (Xarelto); Ticagrelor (Brilinta); Warfarin (Coumadin) only as directed by your surgeon and/or the prescribing physician    Some common examples of other medications that can thin your blood are: Aspirin, Ibuprofen (Advil, Motrin), Naproxen (Aleve), Meloxicam (Mobic), Celecoxib (Celebrex), Fish Oil, many Herbal Supplements.  These medications should usually be stopped at least 7 days prior to surgery.    Check with Prescribing Dr and or surgeon regarding Eliquis and when to stop safely prior to procedure    Tylenol is OK to take for pain the week prior to surgery.    Failure to stop certain medications may interfere with your scheduled surgery.    If you receive instructions from your surgeon regarding what medications to stop prior to surgery, please follow those specific instructions.    If You Have Diabetes:  Do not take any of your diabetic medications, (injectables or by mouth) the morning of surgery unless otherwise instructed by the doctor who manages your diabetes. If you are taking insulin, contact the doctor the manages your diabetes for instructions about any changes to your insulin dosages the day before surgery.      Please take the following medication(s) the day of surgery with small sips of water:              None    Please use your inhaler(s) if needed and bring your inhaler(s) from home the day of surgery.    Showering Before Surgery:     You can

## 2025-02-10 NOTE — H&P
Family History   Problem Relation Age of Onset    Diabetes Mother     Cancer Mother         Blood disorder     Cancer Father 70        Prostate cancer       Review of Systems:     Positive and Negative as described in HPI.    CONSTITUTIONAL: Negative for fevers, chills, sweats, fatigue, and weight loss.  HEENT: Wears glasses. Full upper and lower dentures. Negative for hearing changes, rhinorrhea, and throat pain.  RESPIRATORY: COLEEN - wears CPAP. Negative for shortness of breath, cough, congestion, and wheezing.  CARDIOVASCULAR:  Negative for chest pain, irregular heartbeat, and palpitations.  GASTROINTESTINAL: Negative for reflux, nausea, vomiting, diarrhea, constipation, change in bowel habits, and abdominal pain.   GENITOURINARY: Negative for difficulty of urination, burning with urination, and frequency.   INTEGUMENT: Easy bruising. Negative for rash, skin lesions.   HEMATOLOGIC/LYMPHATIC: Mild BLE swelling (began after starting amlodipine).   ALLERGIC/IMMUNOLOGIC:  Negative for urticaria and itching.  ENDOCRINE: Type 2 DM - on Metformin, managed by PCP. Negative for increase in thirst, increase in urination, and heat or cold intolerance.  MUSCULOSKELETAL: Generalized joint pain d/t arthritis. Negative muscle aches and swelling of joints.  NEUROLOGICAL: Negative for headaches, dizziness, lightheadedness, numbness, and tingling extremities.  BEHAVIOR/PSYCH: Negative for depression and anxiety.    Physical Exam:   BP (!) 157/77   Pulse 80   Temp 97.9 °F (36.6 °C) (Temporal)   Resp 18   Ht 1.829 m (6')   Wt 122.5 kg (270 lb)   SpO2 96%   BMI 36.62 kg/m²   No LMP for male patient.  No obstetric history on file.  No results for input(s): \"POCGLU\" in the last 72 hours.     General Appearance:  Alert, well appearing, and in no acute distress. Obese.  Mental status:  Oriented to person, place, and time.  Head:  Normocephalic and atraumatic.  Eye:  No icterus, redness, pupils equal and reactive, extraocular eye

## 2025-02-11 ENCOUNTER — ANESTHESIA EVENT (OUTPATIENT)
Dept: OPERATING ROOM | Age: 70
End: 2025-02-11
Payer: COMMERCIAL

## 2025-02-11 LAB
EST. AVERAGE GLUCOSE BLD GHB EST-MCNC: 143 MG/DL
HBA1C MFR BLD: 6.6 % (ref 4–6)

## 2025-02-12 ENCOUNTER — TELEPHONE (OUTPATIENT)
Dept: FAMILY MEDICINE CLINIC | Age: 70
End: 2025-02-12

## 2025-02-12 NOTE — TELEPHONE ENCOUNTER
I called and left patient a message on both lines that an appointment for surgical clearance has been requested from his surgeon and we put him on the schedule for tomorrow at 1:40. Stated if he could not keep this appointment to call the office to reschedule. I also asked if he has done the pre op testing for the surgeon and to let us know the answer.

## 2025-02-12 NOTE — TELEPHONE ENCOUNTER
Sharon called back and states that Douglas will take the appointment at 140 tomorrow and yes he did have pre op testing done.

## 2025-02-13 ENCOUNTER — OFFICE VISIT (OUTPATIENT)
Dept: FAMILY MEDICINE CLINIC | Age: 70
End: 2025-02-13
Payer: COMMERCIAL

## 2025-02-13 VITALS
SYSTOLIC BLOOD PRESSURE: 120 MMHG | WEIGHT: 274 LBS | HEART RATE: 69 BPM | TEMPERATURE: 97.6 F | BODY MASS INDEX: 37.16 KG/M2 | DIASTOLIC BLOOD PRESSURE: 80 MMHG | OXYGEN SATURATION: 96 %

## 2025-02-13 DIAGNOSIS — Z01.818 PREOPERATIVE CLEARANCE: Primary | ICD-10-CM

## 2025-02-13 PROCEDURE — 3079F DIAST BP 80-89 MM HG: CPT | Performed by: NURSE PRACTITIONER

## 2025-02-13 PROCEDURE — 1123F ACP DISCUSS/DSCN MKR DOCD: CPT | Performed by: NURSE PRACTITIONER

## 2025-02-13 PROCEDURE — 99214 OFFICE O/P EST MOD 30 MIN: CPT | Performed by: NURSE PRACTITIONER

## 2025-02-13 PROCEDURE — 3074F SYST BP LT 130 MM HG: CPT | Performed by: NURSE PRACTITIONER

## 2025-02-13 SDOH — ECONOMIC STABILITY: FOOD INSECURITY: WITHIN THE PAST 12 MONTHS, YOU WORRIED THAT YOUR FOOD WOULD RUN OUT BEFORE YOU GOT MONEY TO BUY MORE.: NEVER TRUE

## 2025-02-13 SDOH — ECONOMIC STABILITY: FOOD INSECURITY: WITHIN THE PAST 12 MONTHS, THE FOOD YOU BOUGHT JUST DIDN'T LAST AND YOU DIDN'T HAVE MONEY TO GET MORE.: NEVER TRUE

## 2025-02-13 ASSESSMENT — PATIENT HEALTH QUESTIONNAIRE - PHQ9
SUM OF ALL RESPONSES TO PHQ QUESTIONS 1-9: 0
2. FEELING DOWN, DEPRESSED OR HOPELESS: NOT AT ALL
1. LITTLE INTEREST OR PLEASURE IN DOING THINGS: NOT AT ALL
SUM OF ALL RESPONSES TO PHQ QUESTIONS 1-9: 0
SUM OF ALL RESPONSES TO PHQ9 QUESTIONS 1 & 2: 0

## 2025-02-13 NOTE — PROGRESS NOTES
Preoperative Consultation      Douglas Walters  YOB: 1955    Date of Service:  2/13/2025      History of Present Illness  The patient is a 69-year-old male who presents for preoperative clearance.    He is scheduled for a hernia repair surgery on 02/28/2025 at [facility], to be performed by Dr. Freedman. He has already undergone all necessary preoperative testing. He has no known history of complications with anesthesia. He has expressed consent for the administration of blood products if required during the surgical procedure. He has been informed that he should abstain from all medications on the day of surgery. He has not received any specific instructions regarding the discontinuation of Eliquis prior to surgery. He has been informed that the hernia will be repaired with a few stitches and a mesh. The procedure will involve three small incisions.    He has a history of blood clots, with the most recent episode occurring in 2007. He is currently on Eliquis and has a filter in place. He has previously received Lovenox injections.    Supplemental Information  He has dentures.    SOCIAL HISTORY  He does not smoke.    MEDICATIONS  Eliquis, Lovenox (past).       Planned anesthesia: General     Review of Systems  Known anesthesia problems:None   Bleeding risk: Anticoagulant therapy- Eliquis  Personal or FHof DVT/PE: Yes - 2007    Patient objection to receiving blood products: No    Past Medical History:   Diagnosis Date    Bone spur     neck    Deep vein thrombosis (HCC)     x 2    Diabetes type 2, controlled (HCC)     Epigastric hernia     [place] filter in place     Hx of blood clots 2007    DVT- left leg.    Hyperlipidemia     Hypertension     Obesity     Pinched nerve     back    Rotator cuff tear, left     Umbilical hernia     Unspecified sleep apnea     wears CPAP     Past Surgical History:   Procedure Laterality Date    COLONOSCOPY  08/26/2014    KNEE ARTHROSCOPY Left     ROTATOR CUFF

## 2025-02-17 ENCOUNTER — CLINICAL DOCUMENTATION (OUTPATIENT)
Dept: SURGERY | Age: 70
End: 2025-02-17

## 2025-02-17 NOTE — PROGRESS NOTES
This nurse called to notify patient to HOLD(stop taking) Eliquis per his provider DOUG Hernandez via fax communication pg 4/6 \"Discontinue Eliquis 3 days before surgery\", Left message for patient on mobile voice mail [phone] - to NOT take Eliquis 2/25/25 2/28/25 for surgery on 2/28/25 and to follow any prior instructions received regarding surgery time and arrival time any questions to reach out to [phone] or PCP office.

## 2025-02-25 ENCOUNTER — TELEPHONE (OUTPATIENT)
Dept: INFUSION THERAPY | Age: 70
End: 2025-02-25

## 2025-02-25 NOTE — TELEPHONE ENCOUNTER
RN called and spoke with Dr. Perez and he said to stop eliquis 3 days prior and no bridging needed for surgery.  He can restart after surgery.  Clear from hematology standpoint.  V.O. taken and clearance faxed to [phone] confirmation received.

## 2025-02-25 NOTE — PERIOP NOTE
Spoke with patient regarding the instructions on when to stop and restart his eliquis with verbal understanding from the patient.

## 2025-02-28 ENCOUNTER — ANESTHESIA (OUTPATIENT)
Dept: OPERATING ROOM | Age: 70
End: 2025-02-28
Payer: COMMERCIAL

## 2025-02-28 ENCOUNTER — HOSPITAL ENCOUNTER (OUTPATIENT)
Age: 70
Setting detail: OUTPATIENT SURGERY
Discharge: HOME | End: 2025-02-28
Attending: STUDENT IN AN ORGANIZED HEALTH CARE EDUCATION/TRAINING PROGRAM | Admitting: STUDENT IN AN ORGANIZED HEALTH CARE EDUCATION/TRAINING PROGRAM
Payer: COMMERCIAL

## 2025-02-28 VITALS
HEIGHT: 72 IN | DIASTOLIC BLOOD PRESSURE: 69 MMHG | OXYGEN SATURATION: 96 % | TEMPERATURE: 97.5 F | BODY MASS INDEX: 36.57 KG/M2 | HEART RATE: 64 BPM | SYSTOLIC BLOOD PRESSURE: 121 MMHG | RESPIRATION RATE: 16 BRPM | WEIGHT: 270 LBS

## 2025-02-28 DIAGNOSIS — G89.18 ACUTE POST-OPERATIVE PAIN: Primary | ICD-10-CM

## 2025-02-28 LAB
GLUCOSE BLD-MCNC: 118 MG/DL (ref 75–110)
GLUCOSE BLD-MCNC: 187 MG/DL (ref 75–110)

## 2025-02-28 PROCEDURE — 6360000002 HC RX W HCPCS: Performed by: NURSE ANESTHETIST, CERTIFIED REGISTERED

## 2025-02-28 PROCEDURE — 7100000010 HC PHASE II RECOVERY - FIRST 15 MIN: Performed by: STUDENT IN AN ORGANIZED HEALTH CARE EDUCATION/TRAINING PROGRAM

## 2025-02-28 PROCEDURE — C1781 MESH (IMPLANTABLE): HCPCS | Performed by: STUDENT IN AN ORGANIZED HEALTH CARE EDUCATION/TRAINING PROGRAM

## 2025-02-28 PROCEDURE — 2500000003 HC RX 250 WO HCPCS: Performed by: NURSE ANESTHETIST, CERTIFIED REGISTERED

## 2025-02-28 PROCEDURE — 7100000011 HC PHASE II RECOVERY - ADDTL 15 MIN: Performed by: STUDENT IN AN ORGANIZED HEALTH CARE EDUCATION/TRAINING PROGRAM

## 2025-02-28 PROCEDURE — 49594 RPR AA HRN 1ST 3-10 NCR/STRN: CPT | Performed by: STUDENT IN AN ORGANIZED HEALTH CARE EDUCATION/TRAINING PROGRAM

## 2025-02-28 PROCEDURE — 3700000001 HC ADD 15 MINUTES (ANESTHESIA): Performed by: STUDENT IN AN ORGANIZED HEALTH CARE EDUCATION/TRAINING PROGRAM

## 2025-02-28 PROCEDURE — 82947 ASSAY GLUCOSE BLOOD QUANT: CPT

## 2025-02-28 PROCEDURE — 7100000001 HC PACU RECOVERY - ADDTL 15 MIN: Performed by: STUDENT IN AN ORGANIZED HEALTH CARE EDUCATION/TRAINING PROGRAM

## 2025-02-28 PROCEDURE — 7100000000 HC PACU RECOVERY - FIRST 15 MIN: Performed by: STUDENT IN AN ORGANIZED HEALTH CARE EDUCATION/TRAINING PROGRAM

## 2025-02-28 PROCEDURE — 2709999900 HC NON-CHARGEABLE SUPPLY: Performed by: STUDENT IN AN ORGANIZED HEALTH CARE EDUCATION/TRAINING PROGRAM

## 2025-02-28 PROCEDURE — 2580000003 HC RX 258: Performed by: NURSE ANESTHETIST, CERTIFIED REGISTERED

## 2025-02-28 PROCEDURE — 6360000002 HC RX W HCPCS: Performed by: ANESTHESIOLOGY

## 2025-02-28 PROCEDURE — 3700000000 HC ANESTHESIA ATTENDED CARE: Performed by: STUDENT IN AN ORGANIZED HEALTH CARE EDUCATION/TRAINING PROGRAM

## 2025-02-28 PROCEDURE — 2500000003 HC RX 250 WO HCPCS: Performed by: STUDENT IN AN ORGANIZED HEALTH CARE EDUCATION/TRAINING PROGRAM

## 2025-02-28 PROCEDURE — S2900 ROBOTIC SURGICAL SYSTEM: HCPCS | Performed by: STUDENT IN AN ORGANIZED HEALTH CARE EDUCATION/TRAINING PROGRAM

## 2025-02-28 PROCEDURE — 3600000009 HC SURGERY ROBOT BASE: Performed by: STUDENT IN AN ORGANIZED HEALTH CARE EDUCATION/TRAINING PROGRAM

## 2025-02-28 PROCEDURE — 3600000019 HC SURGERY ROBOT ADDTL 15MIN: Performed by: STUDENT IN AN ORGANIZED HEALTH CARE EDUCATION/TRAINING PROGRAM

## 2025-02-28 DEVICE — MESH SURG DIA4.5IN CIR W/ ECHO 2 POS SYS VENTRALIGHT: Type: IMPLANTABLE DEVICE | Site: UMBILICAL | Status: FUNCTIONAL

## 2025-02-28 RX ORDER — SODIUM CHLORIDE 9 MG/ML
INJECTION, SOLUTION INTRAVENOUS PRN
Status: DISCONTINUED | OUTPATIENT
Start: 2025-02-28 | End: 2025-02-28 | Stop reason: HOSPADM

## 2025-02-28 RX ORDER — LIDOCAINE HYDROCHLORIDE 10 MG/ML
1 INJECTION, SOLUTION EPIDURAL; INFILTRATION; INTRACAUDAL; PERINEURAL
Status: DISCONTINUED | OUTPATIENT
Start: 2025-03-01 | End: 2025-02-28 | Stop reason: HOSPADM

## 2025-02-28 RX ORDER — FENTANYL CITRATE 50 UG/ML
25 INJECTION, SOLUTION INTRAMUSCULAR; INTRAVENOUS EVERY 5 MIN PRN
Status: DISCONTINUED | OUTPATIENT
Start: 2025-02-28 | End: 2025-02-28 | Stop reason: HOSPADM

## 2025-02-28 RX ORDER — SODIUM CHLORIDE 9 MG/ML
INJECTION, SOLUTION INTRAVENOUS CONTINUOUS
Status: DISCONTINUED | OUTPATIENT
Start: 2025-02-28 | End: 2025-02-28 | Stop reason: HOSPADM

## 2025-02-28 RX ORDER — LIDOCAINE HYDROCHLORIDE 20 MG/ML
INJECTION, SOLUTION EPIDURAL; INFILTRATION; INTRACAUDAL; PERINEURAL
Status: DISCONTINUED | OUTPATIENT
Start: 2025-02-28 | End: 2025-02-28 | Stop reason: SDUPTHER

## 2025-02-28 RX ORDER — FENTANYL CITRATE 50 UG/ML
INJECTION, SOLUTION INTRAMUSCULAR; INTRAVENOUS
Status: DISCONTINUED | OUTPATIENT
Start: 2025-02-28 | End: 2025-02-28 | Stop reason: SDUPTHER

## 2025-02-28 RX ORDER — OXYCODONE HYDROCHLORIDE 5 MG/1
5 TABLET ORAL EVERY 6 HOURS PRN
Qty: 12 TABLET | Refills: 0 | Status: SHIPPED | OUTPATIENT
Start: 2025-02-28 | End: 2025-03-03

## 2025-02-28 RX ORDER — SODIUM CHLORIDE 0.9 % (FLUSH) 0.9 %
5-40 SYRINGE (ML) INJECTION EVERY 12 HOURS SCHEDULED
Status: DISCONTINUED | OUTPATIENT
Start: 2025-02-28 | End: 2025-02-28 | Stop reason: HOSPADM

## 2025-02-28 RX ORDER — ROCURONIUM BROMIDE 10 MG/ML
INJECTION, SOLUTION INTRAVENOUS
Status: DISCONTINUED | OUTPATIENT
Start: 2025-02-28 | End: 2025-02-28 | Stop reason: SDUPTHER

## 2025-02-28 RX ORDER — HALOPERIDOL 5 MG/ML
1 INJECTION INTRAMUSCULAR
Status: DISCONTINUED | OUTPATIENT
Start: 2025-02-28 | End: 2025-02-28 | Stop reason: HOSPADM

## 2025-02-28 RX ORDER — SODIUM CHLORIDE 0.9 % (FLUSH) 0.9 %
5-40 SYRINGE (ML) INJECTION PRN
Status: DISCONTINUED | OUTPATIENT
Start: 2025-02-28 | End: 2025-02-28 | Stop reason: HOSPADM

## 2025-02-28 RX ORDER — HYDROMORPHONE HYDROCHLORIDE 1 MG/ML
0.5 INJECTION, SOLUTION INTRAMUSCULAR; INTRAVENOUS; SUBCUTANEOUS EVERY 5 MIN PRN
Status: DISCONTINUED | OUTPATIENT
Start: 2025-02-28 | End: 2025-02-28 | Stop reason: HOSPADM

## 2025-02-28 RX ORDER — ONDANSETRON 2 MG/ML
INJECTION INTRAMUSCULAR; INTRAVENOUS
Status: DISCONTINUED | OUTPATIENT
Start: 2025-02-28 | End: 2025-02-28 | Stop reason: SDUPTHER

## 2025-02-28 RX ORDER — SODIUM CHLORIDE, SODIUM LACTATE, POTASSIUM CHLORIDE, CALCIUM CHLORIDE 600; 310; 30; 20 MG/100ML; MG/100ML; MG/100ML; MG/100ML
INJECTION, SOLUTION INTRAVENOUS CONTINUOUS
Status: DISCONTINUED | OUTPATIENT
Start: 2025-02-28 | End: 2025-02-28 | Stop reason: HOSPADM

## 2025-02-28 RX ORDER — METOCLOPRAMIDE HYDROCHLORIDE 5 MG/ML
10 INJECTION INTRAMUSCULAR; INTRAVENOUS
Status: DISCONTINUED | OUTPATIENT
Start: 2025-02-28 | End: 2025-02-28 | Stop reason: HOSPADM

## 2025-02-28 RX ORDER — NALOXONE HYDROCHLORIDE 0.4 MG/ML
INJECTION, SOLUTION INTRAMUSCULAR; INTRAVENOUS; SUBCUTANEOUS PRN
Status: DISCONTINUED | OUTPATIENT
Start: 2025-02-28 | End: 2025-02-28 | Stop reason: HOSPADM

## 2025-02-28 RX ORDER — PROPOFOL 10 MG/ML
INJECTION, EMULSION INTRAVENOUS
Status: DISCONTINUED | OUTPATIENT
Start: 2025-02-28 | End: 2025-02-28 | Stop reason: SDUPTHER

## 2025-02-28 RX ORDER — PHENYLEPHRINE HCL IN 0.9% NACL 1 MG/10 ML
SYRINGE (ML) INTRAVENOUS
Status: DISCONTINUED | OUTPATIENT
Start: 2025-02-28 | End: 2025-02-28 | Stop reason: SDUPTHER

## 2025-02-28 RX ORDER — OXYCODONE HYDROCHLORIDE 5 MG/1
5 TABLET ORAL
Status: DISCONTINUED | OUTPATIENT
Start: 2025-02-28 | End: 2025-02-28 | Stop reason: HOSPADM

## 2025-02-28 RX ORDER — BUPIVACAINE HYDROCHLORIDE AND EPINEPHRINE 5; 5 MG/ML; UG/ML
INJECTION, SOLUTION EPIDURAL; INTRACAUDAL; PERINEURAL PRN
Status: DISCONTINUED | OUTPATIENT
Start: 2025-02-28 | End: 2025-02-28 | Stop reason: ALTCHOICE

## 2025-02-28 RX ORDER — GLYCOPYRROLATE 0.2 MG/ML
INJECTION INTRAMUSCULAR; INTRAVENOUS
Status: DISCONTINUED | OUTPATIENT
Start: 2025-02-28 | End: 2025-02-28 | Stop reason: SDUPTHER

## 2025-02-28 RX ORDER — MIDAZOLAM HYDROCHLORIDE 1 MG/ML
INJECTION, SOLUTION INTRAMUSCULAR; INTRAVENOUS
Status: DISCONTINUED | OUTPATIENT
Start: 2025-02-28 | End: 2025-02-28 | Stop reason: SDUPTHER

## 2025-02-28 RX ORDER — SODIUM CHLORIDE, SODIUM LACTATE, POTASSIUM CHLORIDE, CALCIUM CHLORIDE 600; 310; 30; 20 MG/100ML; MG/100ML; MG/100ML; MG/100ML
INJECTION, SOLUTION INTRAVENOUS
Status: DISCONTINUED | OUTPATIENT
Start: 2025-02-28 | End: 2025-02-28 | Stop reason: SDUPTHER

## 2025-02-28 RX ORDER — DEXAMETHASONE SODIUM PHOSPHATE 10 MG/ML
INJECTION, SOLUTION INTRAMUSCULAR; INTRAVENOUS
Status: DISCONTINUED | OUTPATIENT
Start: 2025-02-28 | End: 2025-02-28 | Stop reason: SDUPTHER

## 2025-02-28 RX ADMIN — GLYCOPYRROLATE 0.3 MG: 0.2 INJECTION INTRAMUSCULAR; INTRAVENOUS at 08:51

## 2025-02-28 RX ADMIN — PROPOFOL 100 MG: 10 INJECTION, EMULSION INTRAVENOUS at 09:04

## 2025-02-28 RX ADMIN — FENTANYL CITRATE 50 MCG: 50 INJECTION INTRAMUSCULAR; INTRAVENOUS at 07:51

## 2025-02-28 RX ADMIN — ROCURONIUM BROMIDE 50 MG: 10 INJECTION, SOLUTION INTRAVENOUS at 07:35

## 2025-02-28 RX ADMIN — Medication 100 MCG: at 08:57

## 2025-02-28 RX ADMIN — SODIUM CHLORIDE, POTASSIUM CHLORIDE, SODIUM LACTATE AND CALCIUM CHLORIDE: 600; 310; 30; 20 INJECTION, SOLUTION INTRAVENOUS at 08:49

## 2025-02-28 RX ADMIN — SUGAMMADEX 200 MG: 100 INJECTION, SOLUTION INTRAVENOUS at 09:18

## 2025-02-28 RX ADMIN — FENTANYL CITRATE 25 MCG: 50 INJECTION INTRAMUSCULAR; INTRAVENOUS at 09:38

## 2025-02-28 RX ADMIN — LIDOCAINE HYDROCHLORIDE 60 MG: 20 INJECTION, SOLUTION EPIDURAL; INFILTRATION; INTRACAUDAL; PERINEURAL at 07:35

## 2025-02-28 RX ADMIN — PROPOFOL 200 MG: 10 INJECTION, EMULSION INTRAVENOUS at 07:35

## 2025-02-28 RX ADMIN — SODIUM CHLORIDE, POTASSIUM CHLORIDE, SODIUM LACTATE AND CALCIUM CHLORIDE: 600; 310; 30; 20 INJECTION, SOLUTION INTRAVENOUS at 07:25

## 2025-02-28 RX ADMIN — DEXAMETHASONE SODIUM PHOSPHATE 10 MG: 10 INJECTION, SOLUTION INTRAMUSCULAR; INTRAVENOUS at 07:35

## 2025-02-28 RX ADMIN — FENTANYL CITRATE 50 MCG: 50 INJECTION INTRAMUSCULAR; INTRAVENOUS at 07:35

## 2025-02-28 RX ADMIN — MIDAZOLAM 2 MG: 1 INJECTION INTRAMUSCULAR; INTRAVENOUS at 07:25

## 2025-02-28 RX ADMIN — FENTANYL CITRATE 25 MCG: 50 INJECTION INTRAMUSCULAR; INTRAVENOUS at 09:49

## 2025-02-28 RX ADMIN — ONDANSETRON 4 MG: 2 INJECTION, SOLUTION INTRAMUSCULAR; INTRAVENOUS at 09:13

## 2025-02-28 ASSESSMENT — PAIN SCALES - GENERAL
PAINLEVEL_OUTOF10: 9
PAINLEVEL_OUTOF10: 5
PAINLEVEL_OUTOF10: 10
PAINLEVEL_OUTOF10: 5

## 2025-02-28 ASSESSMENT — PAIN DESCRIPTION - PAIN TYPE
TYPE: SURGICAL PAIN
TYPE: SURGICAL PAIN

## 2025-02-28 ASSESSMENT — PAIN DESCRIPTION - LOCATION
LOCATION: ABDOMEN

## 2025-02-28 ASSESSMENT — PAIN - FUNCTIONAL ASSESSMENT
PAIN_FUNCTIONAL_ASSESSMENT: PREVENTS OR INTERFERES SOME ACTIVE ACTIVITIES AND ADLS
PAIN_FUNCTIONAL_ASSESSMENT: 0-10
PAIN_FUNCTIONAL_ASSESSMENT: PREVENTS OR INTERFERES SOME ACTIVE ACTIVITIES AND ADLS

## 2025-02-28 ASSESSMENT — PAIN DESCRIPTION - ORIENTATION
ORIENTATION: MID
ORIENTATION: LEFT
ORIENTATION: MID
ORIENTATION: LEFT

## 2025-02-28 ASSESSMENT — PAIN DESCRIPTION - FREQUENCY
FREQUENCY: CONTINUOUS
FREQUENCY: CONTINUOUS

## 2025-02-28 ASSESSMENT — PAIN DESCRIPTION - DESCRIPTORS
DESCRIPTORS: ACHING
DESCRIPTORS: ACHING
DESCRIPTORS: SHARP
DESCRIPTORS: SHARP

## 2025-02-28 NOTE — H&P
Interval H&P Note    Pt Name: Douglas Walters  MRN: [id number]  YOB: 1955  Date of evaluation: 2/28/2025      [x] I have reviewed in Epic the H&P by me dated 2/10/2025 for an Interval History and Physical note (attached below).     [x] I have examined  Douglas Walters, a 69 y.o. male.There are no changes to the patient who is scheduled for HERNIA UMBILICAL REPAIR LAPAROSCOPIC ROBOTIC XI  AND EPIGASTRIC HERNIA REPAIR WITH MESH by Elaina Freedman DO for Umbilical hernia without obstruction and without gangrene; Epigastric surya*. The patient denies new health changes, fever, chills, wheezing, cough, increased SOB, chest pain, open sores or wounds. Denies hx of diabetes. Last Eliquis 2/24/2025.     Patient saw his PCP 2/13/2025 and received clearance for surgery (note in Epic). He was also cleared by hematology with permission to hold Eliquis 3 days prior to surgery (see telephone encounter dated 2/25/2025).     Vital signs: /84   Pulse 75   Temp 97.7 °F (36.5 °C)   Resp 18   Ht 1.829 m (6')   Wt 122.5 kg (270 lb)   SpO2 95%   BMI 36.62 kg/m²     Allergies:  Patient has no known allergies.    Medications:    Prior to Admission medications    Medication Sig Start Date End Date Taking? Authorizing Provider   acetaminophen (TYLENOL) 500 MG tablet Take 2 tablets by mouth in the morning and at bedtime   Yes Provider, MD Darren   metFORMIN (GLUCOPHAGE) 500 MG tablet Take 2 tablets by mouth daily  Patient taking differently: Take 1 tablet by mouth daily 11/25/24  Yes Inge Maldonado APRN - CNP   amLODIPine (NORVASC) 10 MG tablet Take 1 tablet by mouth daily  Patient taking differently: Take 1 tablet by mouth daily Afternoon 11/25/24  Yes Inge Maldonado APRN - CNP   lisinopril-hydroCHLOROthiazide (PRINZIDE;ZESTORETIC) 20-12.5 MG per tablet Take 1 tablet by mouth daily  Patient taking differently: Take 1 tablet by mouth nightly 11/25/24  Yes Inge Maldonado APRN - CNP   rosuvastatin (CRESTOR) 5 MG  \"NA\", \"K\", \"CL\", \"CO2\", \"BUN\", \"CREATININE\", \"GLUCOSE\", \"INR\", \"PROTIME\", \"APTT\", \"AST\", \"ALT\", \"LABALBU\", \"HCG\" in the last 720 hours.    No results for input(s): \"COVID19\" in the last 720 hours.    *Please note that labs listed above are the most recent lab values available in EPIC at the time of the visit and additional labs may have been drawn or resulted since that time.    Imaging/Diagnostics:    CT ABDOMEN PELVIS W CONTRAST - 2024  IMPRESSION:  1. No acute intra-abdominal or intrapelvic abnormality.  2. Colonic diverticulosis without evidence for diverticulitis.  3. Small midline fat containing ventral abdominal hernia above the level of  the umbilicus.  4. Small fat containing umbilical hernia.    EK/10/2025 - See Epic.    Diagnosis:      1. Umbilical hernia without obstruction and without gangrene    Plans:     1. HERNIA UMBILICAL REPAIR LAPAROSCOPIC ROBOTIC XI  AND EPIGASTRIC HERNIA REPAIR WITH MESH      Ashwini Carrington, ADRI - CNP  2/10/2025  11:28 AM

## 2025-02-28 NOTE — ANESTHESIA PRE PROCEDURE
Department of Anesthesiology  Preprocedure Note       Name:  Douglas Walters   Age:  69 y.o.  :  1955                                          MRN:  [id number]         Date:  2025      Surgeon: Surgeon(s):  Elaina Freedman DO    Procedure: Procedure(s):  HERNIA UMBILICAL REPAIR LAPAROSCOPIC ROBOTIC XI  AND EPIGASTRIC HERNIA REPAIR WITH MESH    Medications prior to admission:   Prior to Admission medications    Medication Sig Start Date End Date Taking? Authorizing Provider   acetaminophen (TYLENOL) 500 MG tablet Take 2 tablets by mouth in the morning and at bedtime   Yes Provider, MD Darren   metFORMIN (GLUCOPHAGE) 500 MG tablet Take 2 tablets by mouth daily  Patient taking differently: Take 1 tablet by mouth daily 24  Yes Inge Maldonado APRN - CNP   amLODIPine (NORVASC) 10 MG tablet Take 1 tablet by mouth daily  Patient taking differently: Take 1 tablet by mouth daily Afternoon 24  Yes Inge Maldonado APRN - CNP   lisinopril-hydroCHLOROthiazide (PRINZIDE;ZESTORETIC) 20-12.5 MG per tablet Take 1 tablet by mouth daily  Patient taking differently: Take 1 tablet by mouth nightly 24  Yes Inge Maldonado APRN - CNP   rosuvastatin (CRESTOR) 5 MG tablet Take 1 tablet by mouth daily 24  Yes Inge Maldonado APRN - CNP   ELIQUIS 5 MG TABS tablet TAKE 1 TABLET BY MOUTH TWICE DAILY 25   Sola Dewey APRN - CNP       Current medications:    Current Facility-Administered Medications   Medication Dose Route Frequency Provider Last Rate Last Admin    [START ON 3/1/2025] lidocaine PF 1 % injection 1 mL  1 mL IntraDERmal Once PRN Marta Aguilar MD        0.9 % sodium chloride infusion   IntraVENous Continuous Marta Aguilar MD        lactated ringers infusion   IntraVENous Continuous Marta Aguilar MD        sodium chloride flush 0.9 % injection 5-40 mL  5-40 mL IntraVENous 2 times per day Marta Aguilar MD        sodium chloride flush

## 2025-02-28 NOTE — ANESTHESIA POSTPROCEDURE EVALUATION
Department of Anesthesiology  Postprocedure Note    Patient: Douglas Walters  MRN: [id number]  YOB: 1955  Date of evaluation: 2/28/2025    Procedure Summary       Date: 02/28/25 Room / Location: [address]    Anesthesia Start: 0725 Anesthesia Stop: 0929    Procedure: HERNIA UMBILICAL REPAIR WITH MESH LAPAROSCOPIC ROBOTIC XI  AND EPIGASTRIC HERNIA REPAIR (Abdomen) Diagnosis:       Umbilical hernia without obstruction and without gangrene      Epigastric hernia      (Umbilical hernia without obstruction and without gangrene [K42.9])      (Epigastric hernia [K43.9])    Surgeons: Elaina Freedman DO Responsible Provider: Michelle Hernandez MD    Anesthesia Type: General ASA Status: 4            Anesthesia Type: General    Samanta Phase I: Samanta Score: 9    Samanta Phase II: Samanta Score: 10    Anesthesia Post Evaluation    Patient location during evaluation: PACU  Patient participation: complete - patient participated  Level of consciousness: awake  Airway patency: patent  Nausea & Vomiting: no nausea  Cardiovascular status: blood pressure returned to baseline  Respiratory status: acceptable  Hydration status: euvolemic  Comments: Multimodal analgesia pain management as indicated by procedure  Multimodal analgesia pain management approach  Pain management: adequate    No notable events documented.

## 2025-02-28 NOTE — DISCHARGE INSTRUCTIONS
Patient Discharge Instructions  Surgery Patient Discharge Instructions    RESUME ACTIVITY:     WOUND CARE:   Skin glue used. Do not peel off, allow to fall off naturally; if glue has not fallen off within 10-14 days, OK to gently remove.     BATHING:  Ok to shower 24 hours after surgery. Do not submerge surgical incisions in water (baths, pools, hot tubs, lakes) until cleared by surgeon at post operative follow up visit.    DRIVING: No driving for while on pain medication    RETURN TO WORK:  No lifting more than 10 pounds  The above restrictions are in effect for 6 week(s)    WALKING:    Yes    LIFTING: Avoid lifting objects heavier than 5-10 lbs for 6 weeks.    DIET:   Ok to resume regular diet.     MEDICATIONS: Take medications as prescribed. For adequate pain control, take tylenol and motrin in alternating cycles (e.g. take 500mg tylenol at 8am, take 400mg motrin at 12pm, take 500mg tylenol at 4pm, take 400mg motrin at 8pm, etc.). If prescribed narcotic medications (Norco, Percocet, or Roxicodone), use for breakthrough pain and attempt to wean off medications as soon as possible. Do not take more than 4000mg tylenol in 24 hours.     Take colace 100mg up to twice a day or Miralax 17g daily until you achieve a bowel movement. If you do not have a bowel movement for 3 days after surgery, take magnesium citrate (over the counter) daily.    FOLLOW UP: Call [phone] for follow up appointment with Dr. Freedman in 10-14 days if one has not already been made    SPECIAL INSTRUCTIONS:  After you leave the hospital, call your doctor if any of the following occurs:   Pain or symptoms that worsen   Other new symptoms   Signs of infection, including fever and chills   Nausea and/or vomiting that you can't control with the medications you were given   Pain that you can't control with the medications you've been given   Excessive tenderness or swelling   Changes in bowel or sexual function   Dizziness or lightheadedness   Rash or

## 2025-02-28 NOTE — OP NOTE
Operative Note      Patient: Douglas Walters  YOB: 1955  MRN: [id number]    Date of Procedure: 2/28/2025    Pre-Op Diagnosis Codes:      * Umbilical hernia without obstruction and without gangrene [K42.9]     * Epigastric hernia [K43.9]    Post-Op Diagnosis: Same       Procedure(s):  HERNIA UMBILICAL REPAIR WITH MESH LAPAROSCOPIC ROBOTIC XI  AND EPIGASTRIC HERNIA REPAIR    Surgeon(s):  Elaina Freedman DO    Assistant:   First Assistant: Shelly Rivera    Anesthesia: General    Estimated Blood Loss (mL): Minimal    Complications: None    Specimens:   * No specimens in log *    Implants:  Implant Name Type Inv. Item Serial No.  Lot No. LRB No. Used Action   MESH SURG DIA4.5IN CIR W/ ECHO 2 POS SYS VENTRALIGHT - [id number]  MESH SURG DIA4.5IN CIR W/ ECHO 2 POS SYS VENTRALIGHT  BARD DAVOL-WD [id number] N/A 1 Implanted         Drains:   NG/OG/NJ/NE Tube Orogastric 16 fr (Active)       [REMOVED] Urinary Catheter 02/28/25 Trejo (Removed)       Findings:  Infection Present At Time Of Surgery (PATOS) (choose all levels that have infection present):  No infection present  Other Findings: 2cm umbilical hernia repaired with mesh, epigastric 1.5cm repaired primarily without mesh    Detailed Description of Procedure:   Patient is a 69-year-old male with a long history of umbilical and epigastric hernia that have been becoming bigger over time and now creating discomfort.  Decision made to bring the patient to the operating room for robotic assisted laparoscopic umbilical hernia repair and epigastric hernia repair with mesh. This procedure, including risks, benefits, alternatives and complications have been fully reviewed with the patient and informed consent was obtained.  Risks discussed include infection, bleeding, injury to surrounding structures, need for more procedures, chronic pain, scarring, risks of anesthesia, including myocardial infarction, stroke, fatal arrhythmias.  Patient understands and all  epigastric hernia and the omentum as well as preperitoneal fat was reduced.  The hernia defect was approximately 1.5 cm.  The surrounding preperitoneal fatty tissue was dissected free and skeletonized with electrocautery.  the umbilical and epigastric defect was reapproximated using several 0 V-Loc stitch.  Once that was completed a 11 cm piece of Ventralight ST mesh was placed within the abdominal cavity.  The mesh was sutured circumferentially to the posterior rectus sheath using multiple 3-0 V-Loc stitches. The mesh was noted to lie flat on the anterior surface of the abdomen without any crimpage and in good position with good overlap. Once the mesh was satisfactory placed,  the instruments were removed and the robot was undocked. The remaining ports were then removed.  The port site skin was reapproximated using 4-0 Monocryl. Marcaine 0.5% was used to anesthetize all incision sites. Dermabond was placed over the incision.     The patient tolerated the procedure well. There were no complications.  Patient was extubated and taken to recovery in stable condition.  All sponge, instrument and needle counts were correct prior to skin closure.    Electronically signed by Elaina Freedman DO on 2/28/2025 at 9:25 AM

## 2025-03-13 ENCOUNTER — OFFICE VISIT (OUTPATIENT)
Dept: FAMILY MEDICINE CLINIC | Age: 70
End: 2025-03-13
Payer: COMMERCIAL

## 2025-03-13 VITALS
TEMPERATURE: 98.4 F | SYSTOLIC BLOOD PRESSURE: 118 MMHG | HEIGHT: 72 IN | HEART RATE: 74 BPM | WEIGHT: 264 LBS | BODY MASS INDEX: 35.76 KG/M2 | OXYGEN SATURATION: 96 % | DIASTOLIC BLOOD PRESSURE: 62 MMHG | RESPIRATION RATE: 18 BRPM

## 2025-03-13 DIAGNOSIS — J01.40 ACUTE NON-RECURRENT PANSINUSITIS: Primary | ICD-10-CM

## 2025-03-13 DIAGNOSIS — Z98.890 S/P HERNIA REPAIR: ICD-10-CM

## 2025-03-13 DIAGNOSIS — Z87.19 S/P HERNIA REPAIR: ICD-10-CM

## 2025-03-13 PROCEDURE — 3078F DIAST BP <80 MM HG: CPT | Performed by: NURSE PRACTITIONER

## 2025-03-13 PROCEDURE — 3074F SYST BP LT 130 MM HG: CPT | Performed by: NURSE PRACTITIONER

## 2025-03-13 PROCEDURE — 99214 OFFICE O/P EST MOD 30 MIN: CPT | Performed by: NURSE PRACTITIONER

## 2025-03-13 PROCEDURE — 1123F ACP DISCUSS/DSCN MKR DOCD: CPT | Performed by: NURSE PRACTITIONER

## 2025-03-13 RX ORDER — DOXYCYCLINE HYCLATE 100 MG
100 TABLET ORAL 2 TIMES DAILY
Qty: 14 TABLET | Refills: 0 | Status: SHIPPED | OUTPATIENT
Start: 2025-03-13 | End: 2025-03-20

## 2025-03-13 RX ORDER — LORATADINE 10 MG/1
10 TABLET ORAL DAILY
Qty: 30 TABLET | Refills: 0 | Status: SHIPPED | OUTPATIENT
Start: 2025-03-13 | End: 2025-04-12

## 2025-03-13 RX ORDER — BENZONATATE 100 MG/1
100 CAPSULE ORAL 2 TIMES DAILY PRN
Qty: 20 CAPSULE | Refills: 0 | Status: SHIPPED | OUTPATIENT
Start: 2025-03-13 | End: 2025-03-23

## 2025-03-13 ASSESSMENT — ENCOUNTER SYMPTOMS
SINUS PRESSURE: 1
SORE THROAT: 1
RHINORRHEA: 1
COUGH: 1

## 2025-03-13 NOTE — PROGRESS NOTES
Sola Dewey, [facility]  [address]  [place]  [address]  [phone]  [phone]    Douglas Walters is a 69 y.o. male who is here with c/o of:    Chief Complaint: Cough (Over a week, worse since starting. Luc sob, wheezing, no sputum), Health Maintenance (Diabetic retinal exam and colorectal cancer screen due), and Immunizations (Covid vaccine due)      Patient Accompanied by: patient and wife    HPI - Douglas Walters is here today with c/o:    History of Present Illness  The patient is a 69-year-old male who presents with complaints of cough.    He reports an improvement in his condition; however, he has been experiencing severe sinus issues for the past week, which have progressively worsened. He does not experience shortness of breath or wheezing but reports a persistent throat discomfort. He underwent hernia surgery recently, during which he was intubated, and has been experiencing throat discomfort since then. His son, who had a sinus infection, visited him on Friday night post-surgery. Subsequently, he developed a dry, hacking cough. He reports clear nasal discharge and no expectoration of phlegm. He did not have any fevers or chills, although he did have a fever at the onset of his symptoms. He has no known allergies to antibiotics. He has been self-medicating with Delsym, as advised by his pharmacist due to his concurrent use of blood thinners. He consumed an entire bottle of Delsym yesterday without any noticeable improvement. He also tried Mucinex, which provided some relief.    ALLERGIES  The patient has no known allergies.    MEDICATIONS  Current: Delsym, Mucinex       Health Maintenance Due   Topic Date Due    Diabetic retinal exam  03/20/2018    Colorectal Cancer Screen  08/26/2024        Patient Active Problem List:     Hyperlipidemia     Obesity     OA (osteoarthritis) of knee     Benign essential hypertension     Sleep apnea     [facility] in

## 2025-03-17 ENCOUNTER — OFFICE VISIT (OUTPATIENT)
Dept: NEUROSURGERY | Age: 70
End: 2025-03-17
Payer: COMMERCIAL

## 2025-03-17 VITALS
SYSTOLIC BLOOD PRESSURE: 105 MMHG | BODY MASS INDEX: 35.62 KG/M2 | HEIGHT: 72 IN | WEIGHT: 263 LBS | HEART RATE: 80 BPM | DIASTOLIC BLOOD PRESSURE: 65 MMHG

## 2025-03-17 DIAGNOSIS — M53.9 MULTILEVEL DEGENERATIVE DISC DISEASE: Primary | ICD-10-CM

## 2025-03-17 DIAGNOSIS — M47.26 OTHER SPONDYLOSIS WITH RADICULOPATHY, LUMBAR REGION: ICD-10-CM

## 2025-03-17 PROCEDURE — 3074F SYST BP LT 130 MM HG: CPT

## 2025-03-17 PROCEDURE — 3078F DIAST BP <80 MM HG: CPT

## 2025-03-17 PROCEDURE — 99214 OFFICE O/P EST MOD 30 MIN: CPT

## 2025-03-17 PROCEDURE — 1123F ACP DISCUSS/DSCN MKR DOCD: CPT

## 2025-03-17 NOTE — PROGRESS NOTES
Patient would like to follow up as needed, will reach out if he would like a referral to pain management to discuss injections. Patient to notify the office of any changes in bladder or bowel habits, pain, numbness, tingling, or weakness.    Prefers [facility] in [place] if the time comes.  Follow up as needed.    Followup: Return if symptoms worsen or fail to improve.    Prescriptions Ordered:  No orders of the defined types were placed in this encounter.     Orders Placed:  No orders of the defined types were placed in this encounter.       Electronically signed by ADRI Simmons CNP on 3/17/2025 at 3:32 PM    Please note that this chart was generated using voice recognition Dragon dictation software.  Although every effort was made to ensure the accuracy of this automated transcription, some errors in transcription may have occurred.

## 2025-03-24 ENCOUNTER — HOSPITAL ENCOUNTER (OUTPATIENT)
Dept: SURGERY | Age: 70
Discharge: HOME | End: 2025-03-24
Payer: COMMERCIAL

## 2025-03-24 VITALS
OXYGEN SATURATION: 96 % | BODY MASS INDEX: 36.07 KG/M2 | SYSTOLIC BLOOD PRESSURE: 112 MMHG | WEIGHT: 266 LBS | DIASTOLIC BLOOD PRESSURE: 68 MMHG | HEART RATE: 82 BPM

## 2025-03-24 PROCEDURE — 99212 OFFICE O/P EST SF 10 MIN: CPT | Performed by: STUDENT IN AN ORGANIZED HEALTH CARE EDUCATION/TRAINING PROGRAM

## 2025-03-24 PROCEDURE — 99211 OFF/OP EST MAY X REQ PHY/QHP: CPT | Performed by: STUDENT IN AN ORGANIZED HEALTH CARE EDUCATION/TRAINING PROGRAM

## 2025-03-24 NOTE — PROGRESS NOTES
[facility]  Progress Note        NAME:  Douglas Walters  MRN: [id number]   YOB: 1955   Date: 3/24/2025   Age: 69 y.o.  Gender: male     Body mass index is 36.07 kg/m².     Chief Complaint: f/u surgery    History of Present Illness:  Patient is a 69-year-old male who returns to the clinic for follow up after undergoing robotic assisted laparoscopic umbilical hernia repair with mesh, epigastric hernia repair at [facility] on 02/28/2025.   Patient overall has been doing well postoperatively.  Did have some pain in the 1st few days that required pain medication but otherwise he has been feeling okay.  Patient did have bad sinusitis issues after surgery where he was consistently coughing.  He did develop a dry cough and was seen by his PCP and was advised to take antibiotics as well as antihistamine.  Patient since has much improved and states that he does not cough as often.  Denies any fevers, chills, shortness of breath or chest pain, nausea or vomiting.  Has been having good bowel function and voiding well.  Initially did have some pain in the lower abdomen for a few days, which has completely resolved.  Patient has not other complaints.    Current Outpatient Medications on File Prior to Encounter   Medication Sig Dispense Refill    loratadine (CLARITIN) 10 MG tablet Take 1 tablet by mouth daily 30 tablet 0    acetaminophen (TYLENOL) 500 MG tablet Take 2 tablets by mouth in the morning and at bedtime      ELIQUIS 5 MG TABS tablet TAKE 1 TABLET BY MOUTH TWICE DAILY 180 tablet 1    metFORMIN (GLUCOPHAGE) 500 MG tablet Take 2 tablets by mouth daily 180 tablet 3    amLODIPine (NORVASC) 10 MG tablet Take 1 tablet by mouth daily 90 tablet 2    lisinopril-hydroCHLOROthiazide (PRINZIDE;ZESTORETIC) 20-12.5 MG per tablet Take 1 tablet by mouth daily 90 tablet 2    rosuvastatin (CRESTOR) 5 MG tablet Take 1 tablet by mouth daily 90 tablet 2     No current facility-administered

## 2025-04-24 DIAGNOSIS — E11.65 TYPE 2 DIABETES MELLITUS WITH HYPERGLYCEMIA, WITHOUT LONG-TERM CURRENT USE OF INSULIN (HCC): ICD-10-CM

## 2025-04-24 DIAGNOSIS — I10 PRIMARY HYPERTENSION: ICD-10-CM

## 2025-04-24 RX ORDER — LISINOPRIL AND HYDROCHLOROTHIAZIDE 12.5; 2 MG/1; MG/1
1 TABLET ORAL DAILY
Qty: 90 TABLET | Refills: 2 | Status: SHIPPED | OUTPATIENT
Start: 2025-04-24

## 2025-04-24 RX ORDER — AMLODIPINE BESYLATE 10 MG/1
10 TABLET ORAL DAILY
Qty: 90 TABLET | Refills: 2 | Status: SHIPPED | OUTPATIENT
Start: 2025-04-24

## 2025-04-24 NOTE — TELEPHONE ENCOUNTER
Douglas Walters is calling to request a refill on the following medication(s):    Medication Request:  Requested Prescriptions     Pending Prescriptions Disp Refills    metFORMIN (GLUCOPHAGE) 500 MG tablet [Pharmacy Med Name: METFORMIN 500MG TABLETS] 90 tablet 2     Sig: TAKE 1 TABLET BY MOUTH DAILY    amLODIPine (NORVASC) 10 MG tablet [Pharmacy Med Name: AMLODIPINE BESYLATE 10MG TABLETS] 90 tablet 2     Sig: TAKE 1 TABLET BY MOUTH DAILY    lisinopril-hydroCHLOROthiazide (PRINZIDE;ZESTORETIC) 20-12.5 MG per tablet [Pharmacy Med Name: LISINOPRIL-HCTZ 20/12.5MG TABLETS] 90 tablet 2     Sig: TAKE 1 TABLET BY MOUTH DAILY       Last Visit Date (If Applicable):  11/25/2024    Next Visit Date:    6/2/2025

## 2025-05-19 ENCOUNTER — PREP FOR PROCEDURE (OUTPATIENT)
Dept: GASTROENTEROLOGY | Age: 70
End: 2025-05-19

## 2025-05-19 ENCOUNTER — OFFICE VISIT (OUTPATIENT)
Dept: GASTROENTEROLOGY | Age: 70
End: 2025-05-19
Payer: COMMERCIAL

## 2025-05-19 VITALS
OXYGEN SATURATION: 94 % | BODY MASS INDEX: 35.66 KG/M2 | HEART RATE: 89 BPM | WEIGHT: 263 LBS | RESPIRATION RATE: 16 BRPM | DIASTOLIC BLOOD PRESSURE: 70 MMHG | TEMPERATURE: 97.9 F | SYSTOLIC BLOOD PRESSURE: 113 MMHG

## 2025-05-19 DIAGNOSIS — Z80.0 FAMILY HISTORY OF MALIGNANT NEOPLASM OF GASTROINTESTINAL TRACT: ICD-10-CM

## 2025-05-19 DIAGNOSIS — K57.90 DIVERTICULOSIS: ICD-10-CM

## 2025-05-19 DIAGNOSIS — Z86.0100 HISTORY OF COLON POLYPS: ICD-10-CM

## 2025-05-19 DIAGNOSIS — Z86.0100 HX OF COLONIC POLYPS: ICD-10-CM

## 2025-05-19 DIAGNOSIS — E83.110 HEREDITARY HEMOCHROMATOSIS: Primary | ICD-10-CM

## 2025-05-19 DIAGNOSIS — K42.9 UMBILICAL HERNIA WITHOUT OBSTRUCTION AND WITHOUT GANGRENE: ICD-10-CM

## 2025-05-19 DIAGNOSIS — Z83.719 FAMILY HISTORY OF COLONIC POLYPS: ICD-10-CM

## 2025-05-19 PROCEDURE — 99214 OFFICE O/P EST MOD 30 MIN: CPT | Performed by: INTERNAL MEDICINE

## 2025-05-19 PROCEDURE — 3074F SYST BP LT 130 MM HG: CPT | Performed by: INTERNAL MEDICINE

## 2025-05-19 PROCEDURE — 3078F DIAST BP <80 MM HG: CPT | Performed by: INTERNAL MEDICINE

## 2025-05-19 PROCEDURE — 1123F ACP DISCUSS/DSCN MKR DOCD: CPT | Performed by: INTERNAL MEDICINE

## 2025-05-19 ASSESSMENT — ENCOUNTER SYMPTOMS
VOMITING: 0
VOICE CHANGE: 0
TROUBLE SWALLOWING: 0
RECTAL PAIN: 0
CONSTIPATION: 0
NAUSEA: 0
DIARRHEA: 0
COUGH: 0
ANAL BLEEDING: 0
ABDOMINAL DISTENTION: 0
SORE THROAT: 0
CHOKING: 0
BLOOD IN STOOL: 0
WHEEZING: 0
ABDOMINAL PAIN: 0

## 2025-05-19 NOTE — PROGRESS NOTES
occasional typographical errors. Please contact our office if you have any questions.  This note is created with the assistance of the speech recognition program. While intending to generate a document that actually reflects the content of the visit, it can still have some errors including those of syntax and sound-a-like substitutions which may escape proof reading. Actual meaning can be extrapolated by contextual inference.    Gerri Persaud MD  [facility]  O: #[phone]

## 2025-06-02 ENCOUNTER — OFFICE VISIT (OUTPATIENT)
Dept: FAMILY MEDICINE CLINIC | Age: 70
End: 2025-06-02
Payer: COMMERCIAL

## 2025-06-02 VITALS
OXYGEN SATURATION: 94 % | TEMPERATURE: 97.9 F | HEART RATE: 73 BPM | BODY MASS INDEX: 36.44 KG/M2 | WEIGHT: 269 LBS | SYSTOLIC BLOOD PRESSURE: 128 MMHG | HEIGHT: 72 IN | DIASTOLIC BLOOD PRESSURE: 68 MMHG

## 2025-06-02 DIAGNOSIS — E11.9 CONTROLLED TYPE 2 DIABETES MELLITUS WITHOUT COMPLICATION, WITHOUT LONG-TERM CURRENT USE OF INSULIN (HCC): Primary | ICD-10-CM

## 2025-06-02 DIAGNOSIS — Z86.718 HISTORY OF RECURRENT DEEP VEIN THROMBOSIS (DVT): ICD-10-CM

## 2025-06-02 DIAGNOSIS — E78.2 MIXED HYPERLIPIDEMIA: ICD-10-CM

## 2025-06-02 DIAGNOSIS — Z12.5 SCREENING FOR PROSTATE CANCER: ICD-10-CM

## 2025-06-02 DIAGNOSIS — I10 BENIGN ESSENTIAL HYPERTENSION: ICD-10-CM

## 2025-06-02 LAB — HBA1C MFR BLD: 6.9 %

## 2025-06-02 PROCEDURE — 99214 OFFICE O/P EST MOD 30 MIN: CPT | Performed by: NURSE PRACTITIONER

## 2025-06-02 PROCEDURE — 3044F HG A1C LEVEL LT 7.0%: CPT | Performed by: NURSE PRACTITIONER

## 2025-06-02 PROCEDURE — 3078F DIAST BP <80 MM HG: CPT | Performed by: NURSE PRACTITIONER

## 2025-06-02 PROCEDURE — 3074F SYST BP LT 130 MM HG: CPT | Performed by: NURSE PRACTITIONER

## 2025-06-02 PROCEDURE — 83036 HEMOGLOBIN GLYCOSYLATED A1C: CPT | Performed by: NURSE PRACTITIONER

## 2025-06-02 PROCEDURE — 1123F ACP DISCUSS/DSCN MKR DOCD: CPT | Performed by: NURSE PRACTITIONER

## 2025-06-02 ASSESSMENT — ENCOUNTER SYMPTOMS
WHEEZING: 0
VOMITING: 0
DIARRHEA: 0
NAUSEA: 0
GASTROINTESTINAL NEGATIVE: 1
EYES NEGATIVE: 1
RESPIRATORY NEGATIVE: 1
COLOR CHANGE: 0
CHEST TIGHTNESS: 0
SHORTNESS OF BREATH: 0
ALLERGIC/IMMUNOLOGIC NEGATIVE: 1
COUGH: 0

## 2025-06-02 ASSESSMENT — PATIENT HEALTH QUESTIONNAIRE - PHQ9
SUM OF ALL RESPONSES TO PHQ QUESTIONS 1-9: 0
SUM OF ALL RESPONSES TO PHQ QUESTIONS 1-9: 0
2. FEELING DOWN, DEPRESSED OR HOPELESS: NOT AT ALL
1. LITTLE INTEREST OR PLEASURE IN DOING THINGS: NOT AT ALL
SUM OF ALL RESPONSES TO PHQ QUESTIONS 1-9: 0
SUM OF ALL RESPONSES TO PHQ QUESTIONS 1-9: 0

## 2025-06-02 NOTE — PROGRESS NOTES
[facility]  [address] [place] [address]  Dept: [phone]    Douglas Walters is a 69 y.o. male who presents today for his medical conditions/complaintsas noted below.      Douglas Walters is here today c/o Hypertension, Hyperlipidemia, and Diabetes    Past Medical History:   Diagnosis Date    Bone spur     neck    Deep vein thrombosis (HCC)     x 2    Diabetes type 2, controlled (HCC)     Epigastric hernia     Angelina filter in place     Hx of blood clots     DVT- left leg.    Hyperlipidemia     Hypertension     Obesity     Pinched nerve     back    Rotator cuff tear, left     Umbilical hernia     Unspecified sleep apnea     wears CPAP      Past Surgical History:   Procedure Laterality Date    COLONOSCOPY  2014    KNEE ARTHROSCOPY Left     ROTATOR CUFF REPAIR      TONSILLECTOMY      TOTAL KNEE ARTHROPLASTY Left 2023    UMBILICAL HERNIA REPAIR N/A 2025    HERNIA UMBILICAL REPAIR WITH MESH LAPAROSCOPIC ROBOTIC XI  AND EPIGASTRIC HERNIA REPAIR performed by Elaina Freedman DO at [facility]    VENA CAVA FILTER PLACEMENT      Gladstone Filter       Family History   Problem Relation Age of Onset    Diabetes Mother     Cancer Mother         Blood disorder     Cancer Father 70        Prostate cancer       Social History     Tobacco Use    Smoking status: Former     Current packs/day: 0.00     Average packs/day: 2.5 packs/day for 20.0 years (50.0 ttl pk-yrs)     Types: Cigarettes     Start date: 1987     Quit date: 2007     Years since quittin.3    Smokeless tobacco: Never   Substance Use Topics    Alcohol use: Yes     Alcohol/week: 3.3 - 4.2 standard drinks of alcohol     Types: 4 - 5 Standard drinks or equivalent per week     Comment: occasional / weekends       Current Outpatient Medications   Medication Sig Dispense Refill    metFORMIN (GLUCOPHAGE) 500 MG tablet TAKE 1 TABLET BY MOUTH DAILY 90 tablet 2    amLODIPine (NORVASC) 10 MG tablet TAKE 1 TABLET BY

## 2025-06-09 ENCOUNTER — TELEPHONE (OUTPATIENT)
Dept: FAMILY MEDICINE CLINIC | Age: 70
End: 2025-06-09

## 2025-06-09 NOTE — TELEPHONE ENCOUNTER
Patient's wife is calling and stating the pharmacy is reporting the prescribed Ozempic will need a Prior Authorization.

## 2025-06-13 NOTE — TELEPHONE ENCOUNTER
Prior authorization form completed and faxed via [employer], also scanned to media. Key: [id number]    Left patient a voicemail letting him know.

## 2025-06-23 DIAGNOSIS — E83.110 HEREDITARY HEMOCHROMATOSIS: Primary | ICD-10-CM

## 2025-06-27 ENCOUNTER — HOSPITAL ENCOUNTER (OUTPATIENT)
Age: 70
Discharge: HOME | End: 2025-06-27
Payer: COMMERCIAL

## 2025-06-27 DIAGNOSIS — E83.110 HEREDITARY HEMOCHROMATOSIS: ICD-10-CM

## 2025-06-27 LAB
BASOPHILS # BLD: 0.05 K/UL (ref 0–0.2)
BASOPHILS NFR BLD: 1 % (ref 0–2)
EOSINOPHIL # BLD: 0.18 K/UL (ref 0–0.44)
EOSINOPHILS RELATIVE PERCENT: 4 % (ref 0–4)
ERYTHROCYTE [DISTWIDTH] IN BLOOD BY AUTOMATED COUNT: 12.1 % (ref 11.5–14.9)
FERRITIN SERPL-MCNC: 276 NG/ML
HCT VFR BLD AUTO: 39.9 % (ref 41–53)
HGB BLD-MCNC: 13.5 G/DL (ref 13.5–17.5)
IMM GRANULOCYTES # BLD AUTO: <0.03 K/UL (ref 0–0.3)
IMM GRANULOCYTES NFR BLD: 0 %
LYMPHOCYTES NFR BLD: 1.84 K/UL (ref 1.1–3.7)
LYMPHOCYTES RELATIVE PERCENT: 37 % (ref 24–44)
MCH RBC QN AUTO: 31.8 PG (ref 26–34)
MCHC RBC AUTO-ENTMCNC: 33.8 G/DL (ref 31–37)
MCV RBC AUTO: 94.1 FL (ref 80–100)
MONOCYTES NFR BLD: 0.44 K/UL (ref 0.1–1.2)
MONOCYTES NFR BLD: 9 % (ref 3–12)
NEUTROPHILS NFR BLD: 49 % (ref 36–66)
NEUTS SEG NFR BLD: 2.48 K/UL (ref 1.5–8.1)
NRBC BLD-RTO: 0 PER 100 WBC
PLATELET # BLD AUTO: ABNORMAL K/UL (ref 150–450)
PLATELET, FLUORESCENCE: 133 K/UL (ref 150–450)
PLATELETS.RETICULATED NFR BLD AUTO: 10.3 % (ref 1.1–10.3)
PMV BLD AUTO: 12.1 FL (ref 8–13.5)
RBC # BLD AUTO: 4.24 M/UL (ref 4.21–5.77)
WBC OTHER # BLD: 5 K/UL (ref 3.5–11)

## 2025-06-27 PROCEDURE — 85025 COMPLETE CBC W/AUTO DIFF WBC: CPT

## 2025-06-27 PROCEDURE — 36415 COLL VENOUS BLD VENIPUNCTURE: CPT

## 2025-06-27 PROCEDURE — 82728 ASSAY OF FERRITIN: CPT

## 2025-06-30 ENCOUNTER — OFFICE VISIT (OUTPATIENT)
Age: 70
End: 2025-06-30

## 2025-06-30 ENCOUNTER — HOSPITAL ENCOUNTER (OUTPATIENT)
Dept: INFUSION THERAPY | Age: 70
Discharge: HOME | End: 2025-06-30

## 2025-06-30 ENCOUNTER — TELEPHONE (OUTPATIENT)
Age: 70
End: 2025-06-30

## 2025-06-30 VITALS
DIASTOLIC BLOOD PRESSURE: 70 MMHG | SYSTOLIC BLOOD PRESSURE: 117 MMHG | OXYGEN SATURATION: 97 % | HEART RATE: 58 BPM | WEIGHT: 262.2 LBS | TEMPERATURE: 97.2 F | RESPIRATION RATE: 16 BRPM | BODY MASS INDEX: 35.56 KG/M2

## 2025-06-30 DIAGNOSIS — E83.110 HEREDITARY HEMOCHROMATOSIS: Primary | ICD-10-CM

## 2025-06-30 NOTE — PROGRESS NOTES
_        Chief Complaint   Patient presents with    Follow-up     Review status of disease    Discuss Labs     Ferritin, cbc done 6/23     DIAGNOSIS:        Recurrent bilateral DVTs and pulmonary embolism  Negative hypercoagulability work-up  Lifelong anticoagulation candidate  Hereditary hemochromatosis  Persistent mild thrombocytopenia     CURRENT THERAPY:         Therapeutic phlebotomy as needed for hemochromatosis    BRIEF CASE HISTORY:      Mr. Douglas Walters is a very pleasant 69 y.o. male with history of multiple co morbidities as listed.  The patient is referred for evaluation for the management of thrombocytopenia.  Patient has history of bilateral DVTs and pulmonary embolism for about 8 years.  Initially he was maintained on Coumadin and switched to Eliquis over the last 2 years.  He never had any thrombosis events while on treatment.  He had hypercoagulability work-up done in 2014 which was negative.  Patient had a Angelina filter inserted and was maintained on anticoagulation.  Patient was noted to have mild thrombocytopenia.  Patient has easy bruising secondary to anticoagulation.  He denies any active bleeding.  No nosebleed or gum bleed.  No melena or hematochezia.  No hematemesis.  No fever.  Patient quit smoking about 8 years ago.  Denies alcohol drinking..   INTERIM HISTORY:   Patient seen for follow-up after lab work-up for hemochromatosis.  He had thrombocytopenia secondary to liver disease.  Platelets are stable.  No active bleeding.  No bruising.  Patient feels tired.  Otherwise no other side effects.  No headaches or dizziness.  No TIA or CVA-like symptoms.  No phlebotomies since 9 months.       PAST MEDICAL HISTORY: has a past medical history of Bone spur, Deep vein thrombosis (HCC), Diabetes type 2, controlled (HCC), Epigastric hernia, Angelina filter in place, Hx of blood clots, Hyperlipidemia,

## 2025-06-30 NOTE — TELEPHONE ENCOUNTER
Instructions   from Dr. Kvng Holden MD    No phlebotomy today  RV 6 months with CBC, ferritin before RV  Possible Therapeutic phlebotomy at RV    RV 1-5-2025 @ 9am

## 2025-06-30 NOTE — PATIENT INSTRUCTIONS
No phlebotomy today  RV 6 months with CBC, ferritin before RV  Possible Therapeutic phlebotomy at RV

## (undated) DEVICE — ARM DRAPE

## (undated) DEVICE — GLOVE SURG SZ 65 CRM LTX FREE POLYISOPRENE POLYMER BEAD ANTI

## (undated) DEVICE — SEAL

## (undated) DEVICE — SUTURE MONOCRYL SZ 4-0 L27IN ABSRB UD L19MM PS-2 1/2 CIR PRIM Y426H

## (undated) DEVICE — HYPODERMIC SAFETY NEEDLE: Brand: MAGELLAN

## (undated) DEVICE — GLOVE SURG SZ 6 CRM LTX FREE POLYISOPRENE POLYMER BEAD ANTI

## (undated) DEVICE — INSUFFLATION NEEDLE TO ESTABLISH PNEUMOPERITONEUM.: Brand: INSUFFLATION NEEDLE

## (undated) DEVICE — TIP COVER ACCESSORY

## (undated) DEVICE — STAZ ROBOT: Brand: MEDLINE INDUSTRIES, INC.

## (undated) DEVICE — ELECTRODE PT RET AD L9FT HI MOIST COND ADH HYDRGEL CORDED

## (undated) DEVICE — TUBING INSUFFLATION SMK EVAC HI FLO SET PNEUMOCLEAR

## (undated) DEVICE — BLADELESS OBTURATOR: Brand: WECK VISTA

## (undated) DEVICE — SUTURE DEV SZ 0 L6IN N ABSORBABLE